# Patient Record
Sex: FEMALE | Race: WHITE | NOT HISPANIC OR LATINO | Employment: FULL TIME | URBAN - METROPOLITAN AREA
[De-identification: names, ages, dates, MRNs, and addresses within clinical notes are randomized per-mention and may not be internally consistent; named-entity substitution may affect disease eponyms.]

---

## 2023-02-22 LAB
GRAM STAIN: NORMAL
TISSUE/WOUND CULTURE/SMEAR: NORMAL

## 2023-04-13 LAB
NATRIURETIC PEPTIDE B (PG/ML) IN SER/PLAS: 32 PG/ML (ref 0–99)
TROPONIN I, HIGH SENSITIVITY: 36 NG/L (ref 0–34)

## 2023-09-07 PROBLEM — M17.11 OSTEOARTHRITIS OF RIGHT KNEE: Status: ACTIVE | Noted: 2023-09-07

## 2023-09-07 PROBLEM — R06.02 SHORTNESS OF BREATH AT REST: Status: ACTIVE | Noted: 2023-09-07

## 2023-09-07 PROBLEM — N20.0 NEPHROLITHIASIS: Status: ACTIVE | Noted: 2023-09-07

## 2023-09-07 PROBLEM — M70.62 TROCHANTERIC BURSITIS OF LEFT HIP: Status: ACTIVE | Noted: 2023-09-07

## 2023-09-07 PROBLEM — H01.006 BLEPHARITIS OF BOTH EYES: Status: ACTIVE | Noted: 2023-09-07

## 2023-09-07 PROBLEM — H01.003 BLEPHARITIS OF BOTH EYES: Status: ACTIVE | Noted: 2023-09-07

## 2023-09-07 PROBLEM — R92.8 ABNORMAL FINDING ON BREAST IMAGING: Status: ACTIVE | Noted: 2023-09-07

## 2023-09-07 PROBLEM — N39.46 MIXED INCONTINENCE: Status: ACTIVE | Noted: 2023-09-07

## 2023-09-07 PROBLEM — C50.211: Status: ACTIVE | Noted: 2023-09-07

## 2023-09-07 PROBLEM — I10 BENIGN ESSENTIAL HYPERTENSION: Status: ACTIVE | Noted: 2023-09-07

## 2023-09-07 PROBLEM — H25.013 CORTICAL AGE-RELATED CATARACT OF BOTH EYES: Status: ACTIVE | Noted: 2023-09-07

## 2023-09-07 PROBLEM — Z13.71 BRCA NEGATIVE: Status: ACTIVE | Noted: 2023-09-07

## 2023-09-07 PROBLEM — H52.203 MYOPIA OF BOTH EYES WITH ASTIGMATISM AND PRESBYOPIA: Status: ACTIVE | Noted: 2023-09-07

## 2023-09-07 PROBLEM — T88.8XXA FLUID COLLECTION AT SURGICAL SITE: Status: ACTIVE | Noted: 2023-09-07

## 2023-09-07 PROBLEM — Z78.0 MENOPAUSE: Status: ACTIVE | Noted: 2023-09-07

## 2023-09-07 PROBLEM — H52.31 ANISOMETROPIA: Status: ACTIVE | Noted: 2023-09-07

## 2023-09-07 PROBLEM — H25.13 NUCLEAR SCLEROSIS OF BOTH EYES: Status: ACTIVE | Noted: 2023-09-07

## 2023-09-07 PROBLEM — C44.92 SCC (SQUAMOUS CELL CARCINOMA): Status: ACTIVE | Noted: 2023-09-07

## 2023-09-07 PROBLEM — E66.9 OBESE: Status: ACTIVE | Noted: 2023-09-07

## 2023-09-07 PROBLEM — R53.1 GENERALIZED WEAKNESS: Status: ACTIVE | Noted: 2023-09-07

## 2023-09-07 PROBLEM — M17.11 PRIMARY LOCALIZED OSTEOARTHROSIS OF RIGHT LOWER LEG: Status: ACTIVE | Noted: 2023-09-07

## 2023-09-07 PROBLEM — Z17.0 MALIGNANT NEOPLASM OF CENTRAL PORTION OF RIGHT BREAST IN FEMALE, ESTROGEN RECEPTOR POSITIVE (MULTI): Status: ACTIVE | Noted: 2023-09-07

## 2023-09-07 PROBLEM — H52.4 MYOPIA OF BOTH EYES WITH ASTIGMATISM AND PRESBYOPIA: Status: ACTIVE | Noted: 2023-09-07

## 2023-09-07 PROBLEM — H52.13 MYOPIA OF BOTH EYES WITH ASTIGMATISM AND PRESBYOPIA: Status: ACTIVE | Noted: 2023-09-07

## 2023-09-07 PROBLEM — N39.0 RECURRENT UTI: Status: ACTIVE | Noted: 2023-09-07

## 2023-09-07 PROBLEM — R32 URINARY INCONTINENCE: Status: ACTIVE | Noted: 2023-09-07

## 2023-09-07 PROBLEM — R53.83 FATIGUE: Status: ACTIVE | Noted: 2023-09-07

## 2023-09-07 PROBLEM — M81.0 OSTEOPOROSIS: Status: ACTIVE | Noted: 2023-09-07

## 2023-09-07 PROBLEM — E04.1 THYROID NODULE: Status: ACTIVE | Noted: 2023-09-07

## 2023-09-07 PROBLEM — C50.111 MALIGNANT NEOPLASM OF CENTRAL PORTION OF RIGHT BREAST IN FEMALE, ESTROGEN RECEPTOR POSITIVE (MULTI): Status: ACTIVE | Noted: 2023-09-07

## 2023-09-07 PROBLEM — N63.10 BREAST MASS, RIGHT: Status: ACTIVE | Noted: 2023-09-07

## 2023-09-07 RX ORDER — ESCITALOPRAM OXALATE 5 MG/1
1 TABLET ORAL DAILY
COMMUNITY

## 2023-09-07 RX ORDER — SOLIFENACIN SUCCINATE 10 MG/1
1 TABLET, FILM COATED ORAL DAILY
COMMUNITY
Start: 2020-02-19 | End: 2023-11-30

## 2023-09-07 RX ORDER — LETROZOLE 2.5 MG/1
1 TABLET, FILM COATED ORAL DAILY
COMMUNITY
Start: 2022-01-04 | End: 2023-10-12

## 2023-09-07 RX ORDER — FAMOTIDINE 20 MG/1
1 TABLET, FILM COATED ORAL NIGHTLY
COMMUNITY
Start: 2023-04-13 | End: 2023-10-12

## 2023-09-07 RX ORDER — ACETAMINOPHEN 325 MG/1
650 TABLET ORAL EVERY 4 HOURS PRN
COMMUNITY
Start: 2023-01-25 | End: 2023-12-07 | Stop reason: WASHOUT

## 2023-09-07 RX ORDER — ESCITALOPRAM OXALATE 10 MG/1
1 TABLET ORAL DAILY
COMMUNITY
Start: 2023-04-13 | End: 2023-10-12

## 2023-09-07 RX ORDER — PROCHLORPERAZINE MALEATE 10 MG
10 TABLET ORAL EVERY 6 HOURS PRN
COMMUNITY
Start: 2022-11-11 | End: 2023-10-12

## 2023-09-07 RX ORDER — TOPIRAMATE 25 MG/1
1 TABLET ORAL DAILY PRN
COMMUNITY
Start: 2018-01-23

## 2023-09-07 RX ORDER — ONDANSETRON 4 MG/1
4 TABLET, ORALLY DISINTEGRATING ORAL
COMMUNITY
Start: 2022-04-15 | End: 2023-10-12

## 2023-09-07 RX ORDER — OMEPRAZOLE 20 MG/1
20 CAPSULE, DELAYED RELEASE ORAL DAILY
COMMUNITY

## 2023-09-07 RX ORDER — DIPHENHYDRAMINE HCL 25 MG
50 TABLET ORAL
COMMUNITY
Start: 2023-02-14 | End: 2023-10-12

## 2023-10-12 ENCOUNTER — APPOINTMENT (OUTPATIENT)
Dept: HEMATOLOGY/ONCOLOGY | Facility: CLINIC | Age: 68
End: 2023-10-12
Payer: COMMERCIAL

## 2023-10-12 ENCOUNTER — OFFICE VISIT (OUTPATIENT)
Dept: CARDIOLOGY | Facility: CLINIC | Age: 68
End: 2023-10-12
Payer: COMMERCIAL

## 2023-10-12 ENCOUNTER — HOSPITAL ENCOUNTER (OUTPATIENT)
Dept: CARDIOLOGY | Facility: CLINIC | Age: 68
Discharge: HOME | End: 2023-10-12
Payer: COMMERCIAL

## 2023-10-12 VITALS
SYSTOLIC BLOOD PRESSURE: 126 MMHG | HEART RATE: 68 BPM | OXYGEN SATURATION: 96 % | BODY MASS INDEX: 31.03 KG/M2 | DIASTOLIC BLOOD PRESSURE: 79 MMHG | WEIGHT: 187.4 LBS | TEMPERATURE: 97 F

## 2023-10-12 DIAGNOSIS — I25.3 ATRIAL SEPTAL ANEURYSM: Primary | ICD-10-CM

## 2023-10-12 DIAGNOSIS — Z51.81 ENCOUNTER FOR MONITORING CARDIOTOXIC DRUG THERAPY: ICD-10-CM

## 2023-10-12 DIAGNOSIS — Z79.899 ENCOUNTER FOR MONITORING CARDIOTOXIC DRUG THERAPY: ICD-10-CM

## 2023-10-12 LAB — EJECTION FRACTION APICAL 4 CHAMBER: 61.7

## 2023-10-12 PROCEDURE — 3074F SYST BP LT 130 MM HG: CPT | Performed by: NURSE PRACTITIONER

## 2023-10-12 PROCEDURE — 93356 MYOCRD STRAIN IMG SPCKL TRCK: CPT

## 2023-10-12 PROCEDURE — 1126F AMNT PAIN NOTED NONE PRSNT: CPT | Performed by: NURSE PRACTITIONER

## 2023-10-12 PROCEDURE — 93308 TTE F-UP OR LMTD: CPT | Performed by: STUDENT IN AN ORGANIZED HEALTH CARE EDUCATION/TRAINING PROGRAM

## 2023-10-12 PROCEDURE — 3008F BODY MASS INDEX DOCD: CPT | Performed by: NURSE PRACTITIONER

## 2023-10-12 PROCEDURE — 93356 MYOCRD STRAIN IMG SPCKL TRCK: CPT | Performed by: STUDENT IN AN ORGANIZED HEALTH CARE EDUCATION/TRAINING PROGRAM

## 2023-10-12 PROCEDURE — 76376 3D RENDER W/INTRP POSTPROCES: CPT | Performed by: STUDENT IN AN ORGANIZED HEALTH CARE EDUCATION/TRAINING PROGRAM

## 2023-10-12 PROCEDURE — 99213 OFFICE O/P EST LOW 20 MIN: CPT | Performed by: NURSE PRACTITIONER

## 2023-10-12 PROCEDURE — 3078F DIAST BP <80 MM HG: CPT | Performed by: NURSE PRACTITIONER

## 2023-10-12 PROCEDURE — 76376 3D RENDER W/INTRP POSTPROCES: CPT

## 2023-10-12 PROCEDURE — 1036F TOBACCO NON-USER: CPT | Performed by: NURSE PRACTITIONER

## 2023-10-12 RX ORDER — ASPIRIN 81 MG/1
81 TABLET ORAL ONCE
Status: SHIPPED | OUTPATIENT
Start: 2023-10-12

## 2023-10-12 RX ORDER — ANASTROZOLE 1 MG/1
1 TABLET ORAL DAILY
COMMUNITY
End: 2023-12-12 | Stop reason: SDUPTHER

## 2023-10-12 ASSESSMENT — PAIN SCALES - GENERAL: PAINLEVEL: 0-NO PAIN

## 2023-10-12 NOTE — PROGRESS NOTES
Radiation completed May 2023, Adriamycin commenced 1/2023    No new cardiac symptoms including SOB, CP, edema.

## 2023-10-13 NOTE — PROGRESS NOTES
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  She denies SOB, CHRISTIAN, edema, orthopnea, pnd, or chest pain.      Her last echo showed atrial septal aneurysm.  Today's echo shows same, with daistolic dysfunction, normal LVEF and GLS   Onco-Cardiology Measurements:  Historical Measurements from Previous Study  2D EF (Biplane)                     65%  3D EF                               57%  Global Longitudinal Strain (GLS) -22.9%     10/12/2023 Current Measurements  2D EF (Biplane)                     64%  3D EF                               55%  Global Longitudinal Strain (GLS) -24.4%              she has 3 children and works as an RN in pain management at [employer].    ROS full 10 point ROS complete and negative    Objective   Cardiovascular:      PMI at left midclavicular line. Normal rate. Regular rhythm. Normal S2.       No rub.   Pulses:     Intact distal pulses.   Edema:     Peripheral edema absent.         Lab Review:   Lab Results   Component Value Date     08/30/2023    K 3.9 08/30/2023     08/30/2023    CO2 30 08/30/2023    BUN 26 (H) 08/30/2023    CREATININE 0.96 08/30/2023    GLUCOSE 118 (H) 08/30/2023    CALCIUM 10.5 08/30/2023     Lab Results   Component Value Date    WBC 2.4 (L) 08/30/2023    HGB 11.1 (L) 08/30/2023    HCT 32.1 (L) 08/30/2023    MCV 96 08/30/2023     08/30/2023         Assessment/Plan   The encounter diagnosis was Atrial septal aneurysm.  67 yo CF with h/o SCC of tongue and  current breast cancer stage IIA.  Received AC and exemestane, radiation.  Currently on Anastrozole.  ASA noted on echo - no bubble study done.    - initiate EC 81mg ASA  - early diastolic dysfunction - monitor BP  - return for echo with bubble study and follow up in 3 months

## 2023-11-16 DIAGNOSIS — N39.41 URGE URINARY INCONTINENCE: Primary | ICD-10-CM

## 2023-11-17 ENCOUNTER — TELEPHONE (OUTPATIENT)
Dept: OBSTETRICS AND GYNECOLOGY | Facility: CLINIC | Age: 68
End: 2023-11-17
Payer: COMMERCIAL

## 2023-11-20 ENCOUNTER — TELEPHONE (OUTPATIENT)
Dept: OBSTETRICS AND GYNECOLOGY | Facility: CLINIC | Age: 68
End: 2023-11-20
Payer: COMMERCIAL

## 2023-11-20 DIAGNOSIS — N39.0 RECURRENT UTI: Primary | ICD-10-CM

## 2023-11-20 RX ORDER — NITROFURANTOIN 25; 75 MG/1; MG/1
100 CAPSULE ORAL EVERY 12 HOURS SCHEDULED
Qty: 14 CAPSULE | Refills: 0 | Status: SHIPPED | OUTPATIENT
Start: 2023-11-20

## 2023-11-20 RX ORDER — NITROFURANTOIN 25; 75 MG/1; MG/1
100 CAPSULE ORAL EVERY 12 HOURS SCHEDULED
COMMUNITY
Start: 2023-02-05 | End: 2023-11-20 | Stop reason: SDUPTHER

## 2023-11-20 NOTE — TELEPHONE ENCOUNTER
----- Message from Radha Shabazz sent at 11/19/2023 10:21 PM EST -----  Regarding: refill  Contact: [phone]  Rahul Temple sorry I missed you on Friday.  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  I need a refill for the Solefenacin 10 mg.  and the Macrobid prn. The way it was written  last year. Also, I am not sure if I need a yearly appointment.  I am over 65 and Hetal mentioned that a pap smear was not a yearly exam. Any questions  [phone].

## 2023-11-21 DIAGNOSIS — C44.92 SCC (SQUAMOUS CELL CARCINOMA): ICD-10-CM

## 2023-11-24 ENCOUNTER — APPOINTMENT (OUTPATIENT)
Dept: RADIATION ONCOLOGY | Facility: CLINIC | Age: 68
End: 2023-11-24
Payer: COMMERCIAL

## 2023-11-30 DIAGNOSIS — Z17.0 MALIGNANT NEOPLASM OF UPPER-INNER QUADRANT OF RIGHT BREAST IN FEMALE, ESTROGEN RECEPTOR POSITIVE (MULTI): Primary | ICD-10-CM

## 2023-11-30 DIAGNOSIS — C50.211 MALIGNANT NEOPLASM OF UPPER-INNER QUADRANT OF RIGHT BREAST IN FEMALE, ESTROGEN RECEPTOR POSITIVE (MULTI): Primary | ICD-10-CM

## 2023-11-30 RX ORDER — HEPARIN 100 UNIT/ML
500 SYRINGE INTRAVENOUS AS NEEDED
Status: CANCELLED | OUTPATIENT
Start: 2023-12-08

## 2023-11-30 RX ORDER — SOLIFENACIN SUCCINATE 10 MG/1
10 TABLET, FILM COATED ORAL DAILY
Qty: 90 TABLET | Refills: 0 | Status: SHIPPED | OUTPATIENT
Start: 2023-11-30 | End: 2024-04-03 | Stop reason: SDUPTHER

## 2023-11-30 RX ORDER — HEPARIN SODIUM,PORCINE/PF 10 UNIT/ML
50 SYRINGE (ML) INTRAVENOUS AS NEEDED
Status: CANCELLED | OUTPATIENT
Start: 2023-12-08

## 2023-12-08 ENCOUNTER — OFFICE VISIT (OUTPATIENT)
Dept: PRIMARY CARE | Facility: CLINIC | Age: 68
End: 2023-12-08
Payer: COMMERCIAL

## 2023-12-08 VITALS
HEART RATE: 74 BPM | HEIGHT: 65 IN | TEMPERATURE: 97.7 F | RESPIRATION RATE: 16 BRPM | DIASTOLIC BLOOD PRESSURE: 74 MMHG | WEIGHT: 186 LBS | OXYGEN SATURATION: 94 % | BODY MASS INDEX: 30.99 KG/M2 | SYSTOLIC BLOOD PRESSURE: 110 MMHG

## 2023-12-08 DIAGNOSIS — Z12.11 COLON CANCER SCREENING: Primary | ICD-10-CM

## 2023-12-08 PROCEDURE — 1126F AMNT PAIN NOTED NONE PRSNT: CPT | Performed by: INTERNAL MEDICINE

## 2023-12-08 PROCEDURE — 3078F DIAST BP <80 MM HG: CPT | Performed by: INTERNAL MEDICINE

## 2023-12-08 PROCEDURE — 1036F TOBACCO NON-USER: CPT | Performed by: INTERNAL MEDICINE

## 2023-12-08 PROCEDURE — 3074F SYST BP LT 130 MM HG: CPT | Performed by: INTERNAL MEDICINE

## 2023-12-08 PROCEDURE — 99213 OFFICE O/P EST LOW 20 MIN: CPT | Performed by: INTERNAL MEDICINE

## 2023-12-08 PROCEDURE — 1159F MED LIST DOCD IN RCRD: CPT | Performed by: INTERNAL MEDICINE

## 2023-12-08 PROCEDURE — 3008F BODY MASS INDEX DOCD: CPT | Performed by: INTERNAL MEDICINE

## 2023-12-08 NOTE — PROGRESS NOTES
"Subjective   Patient ID: Radha Shabazz is a 68 y.o. female who presents for Follow-up (Discuss order cologuard, vaccine questions).  Patient is for follow up. She has had a medically challenging year and is touching base on several issues. Including vaccinations and colonoscopy and other preventative measures.         Review of Systems    Objective   Physical Exam  /74   Pulse 74   Temp 36.5 °C (97.7 °F)   Resp 16   Ht 1.655 m (5' 5.16\")   Wt 84.4 kg (186 lb)   SpO2 94%   BMI 30.80 kg/m²         Assessment/Plan   Problem List Items Addressed This Visit    None  Visit Diagnoses       Colon cancer screening    -  Primary    Relevant Orders    Cologuard® colon cancer screening          She is in great spirits considering her challenges and it was great to reconnect and re calibrate her overall health care.        "

## 2023-12-11 ENCOUNTER — INFUSION (OUTPATIENT)
Dept: HEMATOLOGY/ONCOLOGY | Facility: CLINIC | Age: 68
End: 2023-12-11
Payer: COMMERCIAL

## 2023-12-11 ENCOUNTER — OFFICE VISIT (OUTPATIENT)
Dept: PLASTIC SURGERY | Facility: CLINIC | Age: 68
End: 2023-12-11
Payer: COMMERCIAL

## 2023-12-11 VITALS — BODY MASS INDEX: 30.99 KG/M2 | HEIGHT: 65 IN | WEIGHT: 186 LBS

## 2023-12-11 DIAGNOSIS — Z17.0 MALIGNANT NEOPLASM OF UPPER-INNER QUADRANT OF RIGHT BREAST IN FEMALE, ESTROGEN RECEPTOR POSITIVE (MULTI): ICD-10-CM

## 2023-12-11 DIAGNOSIS — C50.211 MALIGNANT NEOPLASM OF UPPER-INNER QUADRANT OF RIGHT BREAST IN FEMALE, ESTROGEN RECEPTOR POSITIVE (MULTI): ICD-10-CM

## 2023-12-11 DIAGNOSIS — Z17.0 MALIGNANT NEOPLASM OF CENTRAL PORTION OF RIGHT BREAST IN FEMALE, ESTROGEN RECEPTOR POSITIVE (MULTI): Primary | ICD-10-CM

## 2023-12-11 DIAGNOSIS — C50.111 MALIGNANT NEOPLASM OF CENTRAL PORTION OF RIGHT BREAST IN FEMALE, ESTROGEN RECEPTOR POSITIVE (MULTI): Primary | ICD-10-CM

## 2023-12-11 LAB
ALBUMIN SERPL BCP-MCNC: 4 G/DL (ref 3.4–5)
ALP SERPL-CCNC: 66 U/L (ref 33–136)
ALT SERPL W P-5'-P-CCNC: 12 U/L (ref 7–45)
ANION GAP SERPL CALC-SCNC: 12 MMOL/L (ref 10–20)
AST SERPL W P-5'-P-CCNC: 16 U/L (ref 9–39)
BASOPHILS # BLD AUTO: 0.03 X10*3/UL (ref 0–0.1)
BASOPHILS NFR BLD AUTO: 1.5 %
BILIRUB SERPL-MCNC: 0.5 MG/DL (ref 0–1.2)
BUN SERPL-MCNC: 25 MG/DL (ref 6–23)
CALCIUM SERPL-MCNC: 10.5 MG/DL (ref 8.6–10.6)
CHLORIDE SERPL-SCNC: 106 MMOL/L (ref 98–107)
CO2 SERPL-SCNC: 25 MMOL/L (ref 21–32)
CREAT SERPL-MCNC: 1.15 MG/DL (ref 0.5–1.05)
EOSINOPHIL # BLD AUTO: 0.11 X10*3/UL (ref 0–0.7)
EOSINOPHIL NFR BLD AUTO: 5.4 %
ERYTHROCYTE [DISTWIDTH] IN BLOOD BY AUTOMATED COUNT: 12.2 % (ref 11.5–14.5)
GFR SERPL CREATININE-BSD FRML MDRD: 52 ML/MIN/1.73M*2
GLUCOSE SERPL-MCNC: 121 MG/DL (ref 74–99)
HCT VFR BLD AUTO: 33.2 % (ref 36–46)
HGB BLD-MCNC: 11.5 G/DL (ref 12–16)
IMM GRANULOCYTES # BLD AUTO: 0 X10*3/UL (ref 0–0.7)
IMM GRANULOCYTES NFR BLD AUTO: 0 % (ref 0–0.9)
LYMPHOCYTES # BLD AUTO: 0.45 X10*3/UL (ref 1.2–4.8)
LYMPHOCYTES NFR BLD AUTO: 22.2 %
MCH RBC QN AUTO: 34.8 PG (ref 26–34)
MCHC RBC AUTO-ENTMCNC: 34.6 G/DL (ref 32–36)
MCV RBC AUTO: 101 FL (ref 80–100)
MONOCYTES # BLD AUTO: 0.22 X10*3/UL (ref 0.1–1)
MONOCYTES NFR BLD AUTO: 10.8 %
NEUTROPHILS # BLD AUTO: 1.22 X10*3/UL (ref 1.2–7.7)
NEUTROPHILS NFR BLD AUTO: 60.1 %
NRBC BLD-RTO: ABNORMAL /100{WBCS}
PLATELET # BLD AUTO: 173 X10*3/UL (ref 150–450)
POTASSIUM SERPL-SCNC: 4.3 MMOL/L (ref 3.5–5.3)
PROT SERPL-MCNC: 6.6 G/DL (ref 6.4–8.2)
RBC # BLD AUTO: 3.3 X10*6/UL (ref 4–5.2)
SODIUM SERPL-SCNC: 139 MMOL/L (ref 136–145)
WBC # BLD AUTO: 2 X10*3/UL (ref 4.4–11.3)

## 2023-12-11 PROCEDURE — 36415 COLL VENOUS BLD VENIPUNCTURE: CPT

## 2023-12-11 PROCEDURE — 99213 OFFICE O/P EST LOW 20 MIN: CPT | Performed by: SURGERY

## 2023-12-11 PROCEDURE — 1159F MED LIST DOCD IN RCRD: CPT | Performed by: SURGERY

## 2023-12-11 PROCEDURE — 96523 IRRIG DRUG DELIVERY DEVICE: CPT

## 2023-12-11 PROCEDURE — 80053 COMPREHEN METABOLIC PANEL: CPT

## 2023-12-11 PROCEDURE — 3008F BODY MASS INDEX DOCD: CPT | Performed by: SURGERY

## 2023-12-11 PROCEDURE — 85025 COMPLETE CBC W/AUTO DIFF WBC: CPT

## 2023-12-11 PROCEDURE — 1036F TOBACCO NON-USER: CPT | Performed by: SURGERY

## 2023-12-11 PROCEDURE — 36591 DRAW BLOOD OFF VENOUS DEVICE: CPT

## 2023-12-11 PROCEDURE — 1126F AMNT PAIN NOTED NONE PRSNT: CPT | Performed by: SURGERY

## 2023-12-11 RX ORDER — HEPARIN SODIUM,PORCINE/PF 10 UNIT/ML
50 SYRINGE (ML) INTRAVENOUS AS NEEDED
Status: CANCELLED | OUTPATIENT
Start: 2024-01-01

## 2023-12-11 RX ORDER — HEPARIN 100 UNIT/ML
500 SYRINGE INTRAVENOUS AS NEEDED
Status: CANCELLED | OUTPATIENT
Start: 2024-01-01

## 2023-12-11 RX ORDER — HEPARIN 100 UNIT/ML
500 SYRINGE INTRAVENOUS AS NEEDED
Status: DISCONTINUED | OUTPATIENT
Start: 2023-12-11 | End: 2023-12-11 | Stop reason: HOSPADM

## 2023-12-11 NOTE — PROGRESS NOTES
Department of Plastic and Reconstructive Surgery            FOLLOW UP Visit    Date: 12/11/2023   Date of Surgery: 9/26/22, 9/17/2022, 10/6/2022    Subjective   Radha Shabazz is a 68 y.o. female  she is status post right mastectomy by Dr. Abraham followed by immediate right breast reconstruction performed on September 16, 2022, complicated by postoperative hematoma status post return the OR for hematoma evacuation and replacement of prepectoral tissue expander construct and ADM on September 17, 2022, she is now status post right breast revision, excision of non-vitalized tissue of the vertical limb of the incision, performed on October 6, 2022.  We saw her last in May 2023 for skin check, she noted to have at that time significant radiation-induced changes of the skin.     She is doing excellently, has no acute complaints, she continues to moisturize daily over the right breast mastectomy flaps which achieved significant improvement, subjectively.        She presents previously to complete her tissue expansion process. She has had no difficulty from previous expansion. She states she has plans to begin radiation therapy soon. She has no complaints at this time.   The total fill to date:     Right: 180+120+120+180cc+120cc= 720cc        We decided to continue to monitor for another 3 months to see if the skin tightness abates or gets worse. We discussed that if the tightening/contracture continues to progress then we would probably defer to a free deep inferior epigastric  surgery.   At this time we discussed the plan would be for a right side second stage breast implant exchange with fat grafting and a matching contralateral mastopexy, but if the skin does continue to progress with contracture we would opt for a free BETTY.          Objective    There were no vitals filed for this visit.    Gen: interactive and pleasant  Head: NCAT  Eyes: EOMI, PERRLA  Mouth: MMM  Throat: trachea midline  Cor:  RRR  Pulm: nonlabored breathing  Abd: s/nt/nd  Neuro: AAOx3  Ext: extremities perfused    Body mass index is 30.95 kg/m².      Focused exam of the: Right breast skin shows improvement in radiation-induced changes, however there is still is very glossy appearance to the skin with firmness.    Assessment/Plan       Radha is a 68 y.o. female who presents for she is status post right mastectomy by Dr. Abraham followed by immediate right breast reconstruction performed on September 16, 2022, complicated by postoperative hematoma status post return the OR for hematoma evacuation and replacement of prepectoral tissue expander construct and ADM on September 17, 2022, she is now status post right breast revision, excision of non-vitalized tissue of the vertical limb of the incision, performed on October 6, 2022.    Plan:   Previously we had discussed offering her either second stage breast reconstruction with contralateral matching reduction.  We also discussed possible free deep inferior epigastric  artery.    My estimation she still candidate for both, however we did discuss that performing free deep inferior epigastric  artery at this time would reduce the chances of future problems related to contracture and cosmetic dissatisfaction and asymmetry.  I indicated to her that performing the free BETTY surgery at this time would be better than performing in the future, given her advancing age and anticipated decrease in functional reserve capacity moving forward.  Therefore was my recommendation that she consider free deep inferior epigastric  surgery at this time, although she still remains a candidate for right second stage breast implant with liposuction from the flanks-we will perform flank only liposuction in order to preserve the medial and lateral perforators for free deep inferior epigastric  flap in the future.      We will perform a phone call follow-up to reconvene with her and  her  to see what she would like to do moving forward.  She would like time to consider this    I spent 30 minutes with this patient. Greater than 50% of this time was spent in the counselling and/or coordination of care of this patient.  This note was created using voice recognition software and was not corrected for typographical or grammatical errors.    Signature: Armando Dickson MD   Date: 12/11/2023

## 2023-12-12 ENCOUNTER — ONCOLOGY MEDICATION OUTREACH (OUTPATIENT)
Dept: HEMATOLOGY/ONCOLOGY | Facility: CLINIC | Age: 68
End: 2023-12-12
Payer: COMMERCIAL

## 2023-12-12 ENCOUNTER — OFFICE VISIT (OUTPATIENT)
Dept: HEMATOLOGY/ONCOLOGY | Facility: HOSPITAL | Age: 68
End: 2023-12-12
Payer: COMMERCIAL

## 2023-12-12 VITALS
SYSTOLIC BLOOD PRESSURE: 123 MMHG | DIASTOLIC BLOOD PRESSURE: 82 MMHG | TEMPERATURE: 97 F | BODY MASS INDEX: 31.17 KG/M2 | WEIGHT: 187.3 LBS | OXYGEN SATURATION: 97 % | HEART RATE: 77 BPM

## 2023-12-12 DIAGNOSIS — C50.211 MALIGNANT NEOPLASM OF UPPER-INNER QUADRANT OF RIGHT BREAST IN FEMALE, ESTROGEN RECEPTOR POSITIVE (MULTI): Primary | ICD-10-CM

## 2023-12-12 DIAGNOSIS — Z17.0 MALIGNANT NEOPLASM OF UPPER-INNER QUADRANT OF RIGHT BREAST IN FEMALE, ESTROGEN RECEPTOR POSITIVE (MULTI): Primary | ICD-10-CM

## 2023-12-12 DIAGNOSIS — C44.92 SCC (SQUAMOUS CELL CARCINOMA): ICD-10-CM

## 2023-12-12 PROCEDURE — 3074F SYST BP LT 130 MM HG: CPT | Performed by: INTERNAL MEDICINE

## 2023-12-12 PROCEDURE — 1125F AMNT PAIN NOTED PAIN PRSNT: CPT | Performed by: INTERNAL MEDICINE

## 2023-12-12 PROCEDURE — 1159F MED LIST DOCD IN RCRD: CPT | Performed by: INTERNAL MEDICINE

## 2023-12-12 PROCEDURE — 99215 OFFICE O/P EST HI 40 MIN: CPT | Performed by: INTERNAL MEDICINE

## 2023-12-12 PROCEDURE — 1036F TOBACCO NON-USER: CPT | Performed by: INTERNAL MEDICINE

## 2023-12-12 PROCEDURE — 3079F DIAST BP 80-89 MM HG: CPT | Performed by: INTERNAL MEDICINE

## 2023-12-12 PROCEDURE — 3008F BODY MASS INDEX DOCD: CPT | Performed by: INTERNAL MEDICINE

## 2023-12-12 RX ORDER — FAMOTIDINE 10 MG/ML
20 INJECTION INTRAVENOUS ONCE AS NEEDED
Status: CANCELLED | OUTPATIENT
Start: 2024-03-05

## 2023-12-12 RX ORDER — ANASTROZOLE 1 MG/1
1 TABLET ORAL DAILY
Qty: 30 TABLET | Refills: 5 | Status: SHIPPED | OUTPATIENT
Start: 2023-12-12

## 2023-12-12 RX ORDER — DIPHENHYDRAMINE HYDROCHLORIDE 50 MG/ML
50 INJECTION INTRAMUSCULAR; INTRAVENOUS AS NEEDED
Status: CANCELLED | OUTPATIENT
Start: 2024-03-05

## 2023-12-12 RX ORDER — ALBUTEROL SULFATE 0.83 MG/ML
3 SOLUTION RESPIRATORY (INHALATION) AS NEEDED
Status: CANCELLED | OUTPATIENT
Start: 2024-03-05

## 2023-12-12 RX ORDER — EPINEPHRINE 0.3 MG/.3ML
0.3 INJECTION SUBCUTANEOUS EVERY 5 MIN PRN
Status: CANCELLED | OUTPATIENT
Start: 2024-03-05

## 2023-12-12 ASSESSMENT — PAIN SCALES - GENERAL: PAINLEVEL: 4

## 2023-12-12 NOTE — PROGRESS NOTES
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 The patient's breast cancer was diagnosed on December 21, 2021, and was grade 2,  estrogen receptor positive at >95%/>95%, progesterone receptor positive at >95%/>95%%, and HER-2/berta negative.   MammaPrint Index = +0.142; translating to Low Risk Luminal-Type A.      CURRENT THERAPY: Arimidex and Abemaciclib      Details of her history are as follows:      12/02/2021: Patient underwent a screening mammogram. This showed a focal asymmetry in the central aspect of the right breast   12/15/2021: Patient underwent a right diagnostic mammogram and breast US. This revealed a mass at the 1:00, 6 cm from the nipple measuring 1.7 x 1.2 x 1.1 cm. Another mass measuring 0.5 cm was seen at 5:00, 4 cm from the nipple. Right axilla US revealed  3 unremarkable LNs. Breast tissue was extremely dense.   12/21/2021: Patient underwent US-guided core biopsies from two sites in the right breast, from 1:00, 6 cm from the nipple and from 9:00, 9 cm from the nipple. Pathology revealed above results.   MRI of the breast was attempted but patient could not tolerate it   01/04/2022: Patient was started on Letrozole 2.5 mg by mouth daily   4/15/2022: Completed a mid treatment US. This showed a stable to slight interval decrease in size of biopsy proven carcinoma.    06/13/2022: Completed right breast US. This showed interval decrease in size of biopsy proven carcinoma.   09/16/2022: Patient underwent a right mastectomy with SNLB and immediate reconstruction. Pathology revealed a 5.2 cm mass with 3/8 macrometastatic LNs plus one LN with isolated  tumor cell. Size of largest LN was 0.3cm. ([id number])   09/17/2022: Evacuation of hematoma with 750 cc of estimated blood loss   10/06/2022: Revision of right reconstructed breast   12/02/2022: Received the first dose of AC   1/13/2023: Received the 4th and last dose of AC after which chemotherapy was discontinued due to side effects and hospitalization. Patient therefore did not receive any Taxane   04/07/2023: Started Exemestane   04/13/2023: Completed radiation   06/30/2023: Exemestane discontinued   July 2023? Started Abemaciclib   06/30/2023: Started Anastrozole                       History of Present Illness:      ID Statement:    SHABBIR SHABAZZ is a 68 year old Female        Chief Complaint: Here for a follow-up visit. Today  is C2D14 of Abemaciclib      Interval History:    Ms. 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 The patient's breast cancer was diagnosed on December 21, 2021, and was grade 2,  estrogen receptor positive at >95%/>95%, progesterone receptor positive at >95%/>95%%, and HER-2/berta negative.  MammaPrint  Index = +0.142; translating to Low Risk Luminal-Type A.     She completed 4 cycles of AC on 1/13/2023 after which chemotherapy was discontinued due to side effects and hospitalization. Patient therefore did not receive any Taxane     INTERVAL HISTORY  Ms. Shabazz returns for a follow-up visit. She reports that she feels fatigued and is also concerned about her blood counts. Reports easy bruising     She reports that she is tolerating Arimidex better than Aromasin.     Her last mammogram was a left mammogram completed on 11/2/2022 and was NAD.  Her most recent DEXA was completed on 04/15/2022  and was normal.            Review of Systems:   ·  System Review All other systems have been reviewed and are negative for complaint.             Allergies and Intolerances:       Allergies:         acetaZOLAMIDE: Drug, Unknown, Active         iodinated radiocontrast agents: Drug Category, Unknown, Active         sulfa drugs: Drug Category, Unknown, Active     Outpatient Medication Profile:       Current Outpatient Medications:     escitalopram (Lexapro) 5 mg tablet, Take 1 tablet (5 mg) by mouth once daily., Disp: , Rfl:     nitrofurantoin, macrocrystal-monohydrate, (Macrobid) 100 mg capsule, Take 1 capsule (100 mg) by mouth every 12 hours., Disp: 14 capsule, Rfl: 0    omeprazole (PriLOSEC) 20 mg DR capsule, Take 1 capsule (20 mg) by mouth once daily., Disp: , Rfl:     solifenacin (VESIcare) 10 mg tablet, TAKE ONE TABLET BY MOUTH DAILY, Disp: 90 tablet, Rfl: 0    topiramate (Topamax) 25 mg tablet, Take 1 tablet (25 mg) by mouth once daily as needed., Disp: , Rfl:     abemaciclib (Verzenio) 50 mg tablet, Take 1 tablet (50 mg total) by mouth 2 times a day.  Swallow whole., Disp: 60 tablet, Rfl: 3    anastrozole (Arimidex) 1 mg tablet, Take 1 tablet (1 mg total) by mouth once daily.  Swallow whole with a drink of water., Disp: 30 tablet, Rfl: 5    UNABLE TO FIND, Breast prosthesis for left and/or right breast, Disp: , Rfl:     UNABLE TO FIND, Mastectomy Bra for Right Mastectomy, Disp: , Rfl:     Current Facility-Administered Medications:     aspirin EC tablet 81 mg, 81 mg, oral, Once, Elizabeth Trujillo, APRN-CNP          Surg History:         Goiter: ICD-10: E04.9, Status: Active         Oncology follow-up encounter: ICD-10: Z09, Status: Active         Shortness of breath on exertion: ICD-10: R06.02, Status:  Active         Shortness of breath at rest: ICD-10: R06.02, Status: Active         Abnormal computed tomography of lung: ICD-10: R91.8, Status:  Active         Urinary tract infection without hematuria, site unspecified : ICD-10: N39.0, Status: Active         Chemotherapy-induced fatigue: ICD-10: R53.83, Status: Active         Weakness  generalized: ICD-10: R53.1, Status: Active         Malignant neoplasm of lower-outer quadrant of right female breast : ICD-10: C50.511, Status: Active         Shortness of breath at rest: ICD-10: R06.02, Status: Active         Encounter for chemotherapy management: ICD-10: Z51.11, Status:  Active     Family History: No Family History items are recorded  in the problem list.       Social History:   Social Substance History:  ·  Smoking Status never smoker (1)   ·  Additional History     Past medical history: Hypertension, GERD, migraines, shingles, oral tongue cancer.      Past surgical history: Appendectomy, CAYDEN/BSO, partial glossectomy with split thickness skin graft from her left wrist.     Additional Social History:  She is  and accompanied today by her .     Family History:  Patient has a personal history of head and neck cancer. Patient has twopaternal  uncles with history of colon cancer and head and neck cancer     Breast Cancer Risk Factors:  , Had her menarche at age 12 years, 1st pregnancy at age 31 years, TAHBSO for fibroid uterus at age 46 years, used BCP for a 10 years in her 20s and  HRT until current diagnosis for a total of 20 years.  (1)           Vitals and Measurements:   Visit Vitals  /82   Pulse 77   Temp 36.1 °C (97 °F)   Wt 85 kg (187 lb 4.8 oz)   SpO2 97%   BMI 31.17 kg/m²   Smoking Status Never   BSA 1.97 m²      Physical Exam:      Constitutional: Well developed, awake/alert/oriented  x3, no distress, alert and cooperative, has more energy then at past visits   Eyes: PERRL, clear sclera   Respiratory/Thorax: Patent airways, normal breath  sounds with good chest expansion   Cardiovascular: Regular rate and rhythm, no murmurs;  muffled   Gastrointestinal: Nondistended, soft, non-tender,  no masses palpable, no organomegaly   Musculoskeletal: ROM intact, no joint swelling, normal  strength   Extremities: normal extremities, no cyanosis edema,  contusions or wounds, no  clubbing   Neurological: alert and oriented x3, weak   Breast: Right mastectomy with implant reconstruction  is without masses, nodules, skin changes. Left breast without masses, nodules, skin changes, discharge.   Psychological: Appropriate mood and behavior   Skin: Warm and dry, no lesions, no rashes         Lab Results:     Lab Results   Component Value Date    WBC 2.0 (L) 12/11/2023    HGB 11.5 (L) 12/11/2023    HCT 33.2 (L) 12/11/2023     (H) 12/11/2023     12/11/2023        Lab Results   Component Value Date    NEUTROABS 1.22 12/11/2023          Chemistry    Lab Results   Component Value Date/Time     12/11/2023 0822    K 4.3 12/11/2023 0822     12/11/2023 0822    CO2 25 12/11/2023 0822    BUN 25 (H) 12/11/2023 0822    CREATININE 1.15 (H) 12/11/2023 0822    Lab Results   Component Value Date/Time    CALCIUM 10.5 12/11/2023 0822    ALKPHOS 66 12/11/2023 0822    AST 16 12/11/2023 0822    ALT 12 12/11/2023 0822    BILITOT 0.5 12/11/2023 0822         Radiology Result:     ·  Results             Impression:     No mammographic evidence of malignancy.     BI-RADS CATEGORY:  Category: 1 - Negative.  Recommendation: 1 Year Follow-up.     For any future breast imaging appointments, please call [phone]  ([phone]).     Patient letter sent SNORM      Mamm Digital Diagnostic Mammography Unilateral Left with tomosynthesis [Nov 2 2022  3:33PM]          Assessment and Plan:   Assessment:    Ms. 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 The patient's breast cancer was diagnosed on December 21, 2021, and was grade 2,  estrogen receptor positive at >95%/>95%, progesterone receptor positive at >95%/>95%%, and HER-2/berta negative. MammaPrint  Index = +0.142; translating to Low Risk Luminal-Type A.      Currently on Arimidex and  Abemaciclib. Patient is requesting dose reduction in Abemaciclib due to side effects.  Her ANC today is 1220k/ul.         Plan:        Continue Arimidex 1 mg by mouth daily in 2 weeks   Reduce Abemaciclib to 50mg by BID.    Next Zometa in March   CBC with diff and CMP in 3 months   RTC 3/3 for 2 nd dose Zometa and MD visit  Left mammogram

## 2023-12-13 ENCOUNTER — ANCILLARY PROCEDURE (OUTPATIENT)
Dept: RADIOLOGY | Facility: CLINIC | Age: 68
End: 2023-12-13
Payer: COMMERCIAL

## 2023-12-13 ENCOUNTER — SOCIAL WORK (OUTPATIENT)
Dept: CASE MANAGEMENT | Facility: HOSPITAL | Age: 68
End: 2023-12-13
Payer: COMMERCIAL

## 2023-12-13 DIAGNOSIS — Z17.0 MALIGNANT NEOPLASM OF UPPER-INNER QUADRANT OF RIGHT BREAST IN FEMALE, ESTROGEN RECEPTOR POSITIVE (MULTI): ICD-10-CM

## 2023-12-13 DIAGNOSIS — C50.211 MALIGNANT NEOPLASM OF UPPER-INNER QUADRANT OF RIGHT BREAST IN FEMALE, ESTROGEN RECEPTOR POSITIVE (MULTI): ICD-10-CM

## 2023-12-13 PROCEDURE — 77065 DX MAMMO INCL CAD UNI: CPT | Mod: LEFT SIDE | Performed by: RADIOLOGY

## 2023-12-13 PROCEDURE — 77061 BREAST TOMOSYNTHESIS UNI: CPT | Mod: LT

## 2023-12-13 PROCEDURE — G0279 TOMOSYNTHESIS, MAMMO: HCPCS | Mod: LEFT SIDE | Performed by: RADIOLOGY

## 2023-12-13 NOTE — PROGRESS NOTES
DB had reached out to patient and spouse to offer assistance with the renewal application for free Verzenio with [email or web address]. Spouse provided completed application. SW able to have Dr. Robin review and sign HCP sections. Completed application sent via fax today. DB to follow.   ADDENDUM: December 19, 2023 Spouse reached out to DB. Patient approved for assistance with [facility] [place] for free Verzenio. Med-onc changed the dose. DB to obtain a new RX and send to [facility]. SW to follow.

## 2023-12-14 ENCOUNTER — SPECIALTY PHARMACY (OUTPATIENT)
Dept: PHARMACY | Facility: CLINIC | Age: 68
End: 2023-12-14

## 2024-01-05 ENCOUNTER — SOCIAL WORK (OUTPATIENT)
Dept: CASE MANAGEMENT | Facility: HOSPITAL | Age: 69
End: 2024-01-05
Payer: COMMERCIAL

## 2024-01-07 LAB — NONINV COLON CA DNA+OCC BLD SCRN STL QL: NEGATIVE

## 2024-01-08 NOTE — PROGRESS NOTES
SW received call from patient's spouse. They have not received the Verzenio for this month. SW offered to investigate. DB reached out to [facility]. They are in need of a new RX as dose has changed. CNP able [place] sign RX [place]. DB had CNP sign; faxed to [facility]. DB was informed by [facility] to call on Monday 01.08.2024 to arrange delivery. DB advised spouse.  ADDENDUM: January 8, 2024 DB reached out to [facility]. RX verified. Patient needs to call pharmacy to arrange delivery. DB spoke with spouse; information on how to contact pharmacy was emailed to spouse, per his request. Spouse aware they need to call monthly to arrange delivery. SW available as needed.

## 2024-01-12 ENCOUNTER — APPOINTMENT (OUTPATIENT)
Dept: CARDIOLOGY | Facility: CLINIC | Age: 69
End: 2024-01-12
Payer: COMMERCIAL

## 2024-01-12 DIAGNOSIS — Z85.3 PERSONAL HISTORY OF BREAST CANCER: Primary | ICD-10-CM

## 2024-01-12 RX ORDER — ANASTROZOLE 1 MG/1
1 TABLET ORAL DAILY
Qty: 90 TABLET | Refills: 3 | Status: SHIPPED | OUTPATIENT
Start: 2024-01-12

## 2024-01-18 ENCOUNTER — LAB (OUTPATIENT)
Dept: LAB | Facility: LAB | Age: 69
End: 2024-01-18
Payer: COMMERCIAL

## 2024-01-18 ENCOUNTER — OFFICE VISIT (OUTPATIENT)
Dept: CARDIOLOGY | Facility: CLINIC | Age: 69
End: 2024-01-18
Payer: COMMERCIAL

## 2024-01-18 ENCOUNTER — HOSPITAL ENCOUNTER (OUTPATIENT)
Dept: CARDIOLOGY | Facility: CLINIC | Age: 69
Discharge: HOME | End: 2024-01-18
Payer: COMMERCIAL

## 2024-01-18 VITALS
BODY MASS INDEX: 31.29 KG/M2 | SYSTOLIC BLOOD PRESSURE: 123 MMHG | TEMPERATURE: 97.7 F | DIASTOLIC BLOOD PRESSURE: 86 MMHG | RESPIRATION RATE: 18 BRPM | OXYGEN SATURATION: 99 % | WEIGHT: 188.05 LBS | HEART RATE: 75 BPM

## 2024-01-18 DIAGNOSIS — D64.9 ANEMIA, UNSPECIFIED TYPE: ICD-10-CM

## 2024-01-18 DIAGNOSIS — C50.211 MALIGNANT NEOPLASM OF UPPER-INNER QUADRANT OF RIGHT BREAST IN FEMALE, ESTROGEN RECEPTOR POSITIVE (MULTI): ICD-10-CM

## 2024-01-18 DIAGNOSIS — Z79.899 ENCOUNTER FOR MONITORING CARDIOTOXIC DRUG THERAPY: Primary | ICD-10-CM

## 2024-01-18 DIAGNOSIS — Z51.81 ENCOUNTER FOR MONITORING CARDIOTOXIC DRUG THERAPY: Primary | ICD-10-CM

## 2024-01-18 DIAGNOSIS — Z17.0 MALIGNANT NEOPLASM OF UPPER-INNER QUADRANT OF RIGHT BREAST IN FEMALE, ESTROGEN RECEPTOR POSITIVE (MULTI): ICD-10-CM

## 2024-01-18 DIAGNOSIS — I25.3 ATRIAL SEPTAL ANEURYSM: ICD-10-CM

## 2024-01-18 DIAGNOSIS — I25.3 ATRIAL SEPTAL ANEURYSM: Primary | ICD-10-CM

## 2024-01-18 LAB
BASOPHILS # BLD AUTO: 0.06 X10*3/UL (ref 0–0.1)
BASOPHILS NFR BLD AUTO: 1.6 %
EJECTION FRACTION APICAL 4 CHAMBER: 63.1
EJECTION FRACTION: 69
EOSINOPHIL # BLD AUTO: 0.18 X10*3/UL (ref 0–0.7)
EOSINOPHIL NFR BLD AUTO: 4.7 %
ERYTHROCYTE [DISTWIDTH] IN BLOOD BY AUTOMATED COUNT: 12.3 % (ref 11.5–14.5)
HCT VFR BLD AUTO: 38.5 % (ref 36–46)
HGB BLD-MCNC: 12.8 G/DL (ref 12–16)
IMM GRANULOCYTES # BLD AUTO: 0.01 X10*3/UL (ref 0–0.7)
IMM GRANULOCYTES NFR BLD AUTO: 0.3 % (ref 0–0.9)
IRON SATN MFR SERPL: 42 % (ref 25–45)
IRON SERPL-MCNC: 135 UG/DL (ref 35–150)
LYMPHOCYTES # BLD AUTO: 0.66 X10*3/UL (ref 1.2–4.8)
LYMPHOCYTES NFR BLD AUTO: 17.3 %
MCH RBC QN AUTO: 33.2 PG (ref 26–34)
MCHC RBC AUTO-ENTMCNC: 33.2 G/DL (ref 32–36)
MCV RBC AUTO: 100 FL (ref 80–100)
MITRAL VALVE E/A RATIO: 0.73
MONOCYTES # BLD AUTO: 0.45 X10*3/UL (ref 0.1–1)
MONOCYTES NFR BLD AUTO: 11.8 %
NEUTROPHILS # BLD AUTO: 2.46 X10*3/UL (ref 1.2–7.7)
NEUTROPHILS NFR BLD AUTO: 64.3 %
NRBC BLD-RTO: 0 /100 WBCS (ref 0–0)
PLATELET # BLD AUTO: 228 X10*3/UL (ref 150–450)
RBC # BLD AUTO: 3.85 X10*6/UL (ref 4–5.2)
TIBC SERPL-MCNC: 324 UG/DL (ref 240–445)
UIBC SERPL-MCNC: 189 UG/DL (ref 110–370)
WBC # BLD AUTO: 3.8 X10*3/UL (ref 4.4–11.3)

## 2024-01-18 PROCEDURE — 1126F AMNT PAIN NOTED NONE PRSNT: CPT | Performed by: NURSE PRACTITIONER

## 2024-01-18 PROCEDURE — 93325 DOPPLER ECHO COLOR FLOW MAPG: CPT | Performed by: INTERNAL MEDICINE

## 2024-01-18 PROCEDURE — 83540 ASSAY OF IRON: CPT

## 2024-01-18 PROCEDURE — 99213 OFFICE O/P EST LOW 20 MIN: CPT | Mod: ZK | Performed by: NURSE PRACTITIONER

## 2024-01-18 PROCEDURE — 85025 COMPLETE CBC W/AUTO DIFF WBC: CPT

## 2024-01-18 PROCEDURE — 1159F MED LIST DOCD IN RCRD: CPT | Performed by: NURSE PRACTITIONER

## 2024-01-18 PROCEDURE — 3008F BODY MASS INDEX DOCD: CPT | Performed by: NURSE PRACTITIONER

## 2024-01-18 PROCEDURE — 99213 OFFICE O/P EST LOW 20 MIN: CPT | Performed by: NURSE PRACTITIONER

## 2024-01-18 PROCEDURE — 93321 DOPPLER ECHO F-UP/LMTD STD: CPT | Performed by: INTERNAL MEDICINE

## 2024-01-18 PROCEDURE — 93356 MYOCRD STRAIN IMG SPCKL TRCK: CPT | Performed by: INTERNAL MEDICINE

## 2024-01-18 PROCEDURE — 93308 TTE F-UP OR LMTD: CPT | Performed by: INTERNAL MEDICINE

## 2024-01-18 PROCEDURE — 2500000004 HC RX 250 GENERAL PHARMACY W/ HCPCS (ALT 636 FOR OP/ED): Performed by: INTERNAL MEDICINE

## 2024-01-18 PROCEDURE — 3074F SYST BP LT 130 MM HG: CPT | Performed by: NURSE PRACTITIONER

## 2024-01-18 PROCEDURE — 76376 3D RENDER W/INTRP POSTPROCES: CPT

## 2024-01-18 PROCEDURE — 76376 3D RENDER W/INTRP POSTPROCES: CPT | Performed by: INTERNAL MEDICINE

## 2024-01-18 PROCEDURE — 1036F TOBACCO NON-USER: CPT | Performed by: NURSE PRACTITIONER

## 2024-01-18 PROCEDURE — 83550 IRON BINDING TEST: CPT

## 2024-01-18 PROCEDURE — 3079F DIAST BP 80-89 MM HG: CPT | Performed by: NURSE PRACTITIONER

## 2024-01-18 RX ADMIN — PERFLUTREN 3 ML OF DILUTION: 6.52 INJECTION, SUSPENSION INTRAVENOUS at 10:32

## 2024-01-18 ASSESSMENT — PAIN SCALES - GENERAL: PAINLEVEL: 0-NO PAIN

## 2024-01-18 NOTE — PROGRESS NOTES
Chief Complaint:  Follow-up cardiovascular evaluation and optimization and ongoing monitoring for cardiotoxic medication     HPI  Cancer Diagnosis: SCC of tongue, right breast 12/2021          Treatment: ddAC x4 12/02/2022 to 1/13/2022 +Taxol  Currently on Zometa 50mg   Cumulative Dose: 240 mg/m2  Radiation: No    Presents today for follow up.  C/O exertional dyspnea, especially when carrying something upstairs.  No chest pain, orthopnea, pnd, edema.  Continues to be able to work without a problem.  Has occasional orthostatic dizziness.  Denies fever, chills, night sweats.    Risk Factors: HTN  Social Hx: Never smoker   Family Hx:   Cardiac Medications- spironolactone, asa    Visit Vitals  /86   Pulse 75   Temp 36.5 °C (97.7 °F)   Resp 18   Wt 85.3 kg (188 lb 0.8 oz)   SpO2 99%   BMI 31.29 kg/m²   OB Status Postmenopausal   Smoking Status Never   BSA 1.98 m²     Current Outpatient Medications   Medication Instructions    abemaciclib (VERZENIO) 50 mg, oral, 2 times daily, Swallow whole.    anastrozole (ARIMIDEX) 1 mg, oral, Daily, Swallow whole with a drink of water.    anastrozole (ARIMIDEX) 1 mg, oral, Daily, Swallow whole with a drink of water.    escitalopram (Lexapro) 5 mg tablet 1 tablet, oral, Daily    nitrofurantoin, macrocrystal-monohydrate, (Macrobid) 100 mg capsule 100 mg, oral, Every 12 hours scheduled    omeprazole (PRILOSEC) 20 mg, oral, Daily    solifenacin (VESICARE) 10 mg, oral, Daily    topiramate (Topamax) 25 mg tablet 1 tablet, oral, Daily PRN    UNABLE TO FIND Breast prosthesis for left and/or right breast    UNABLE TO FIND Mastectomy Bra for Right Mastectomy      PE:  alert oriented, nAD  Lungs ctab  Heart RRR S1S2 no m/r/g neck veins flat  Abd benign  No edema    A/P:  67 yo CF on long term zometa, having routine echoes.  Now c/o exertional dyspnea without associated symptoms.  Normal BNP.  - EKG today  - check iron studies  - continue asa  - continue low sugar diet, eliminate processed  foods  - follow up onco echo and visit in 3 months - if still having dyspnea at follow up will do nuclear stress    -

## 2024-01-24 ENCOUNTER — INFUSION (OUTPATIENT)
Dept: HEMATOLOGY/ONCOLOGY | Facility: CLINIC | Age: 69
End: 2024-01-24
Payer: COMMERCIAL

## 2024-01-24 ENCOUNTER — APPOINTMENT (OUTPATIENT)
Dept: HEMATOLOGY/ONCOLOGY | Facility: CLINIC | Age: 69
End: 2024-01-24
Payer: COMMERCIAL

## 2024-01-24 VITALS
OXYGEN SATURATION: 99 % | DIASTOLIC BLOOD PRESSURE: 71 MMHG | RESPIRATION RATE: 18 BRPM | HEART RATE: 65 BPM | TEMPERATURE: 96.8 F | SYSTOLIC BLOOD PRESSURE: 110 MMHG

## 2024-01-24 DIAGNOSIS — C50.211 MALIGNANT NEOPLASM OF UPPER-INNER QUADRANT OF RIGHT BREAST IN FEMALE, ESTROGEN RECEPTOR POSITIVE (MULTI): ICD-10-CM

## 2024-01-24 DIAGNOSIS — Z17.0 MALIGNANT NEOPLASM OF UPPER-INNER QUADRANT OF RIGHT BREAST IN FEMALE, ESTROGEN RECEPTOR POSITIVE (MULTI): ICD-10-CM

## 2024-01-24 PROCEDURE — 96523 IRRIG DRUG DELIVERY DEVICE: CPT

## 2024-01-24 PROCEDURE — 2500000004 HC RX 250 GENERAL PHARMACY W/ HCPCS (ALT 636 FOR OP/ED): Performed by: NURSE PRACTITIONER

## 2024-01-24 RX ORDER — HEPARIN SODIUM,PORCINE/PF 10 UNIT/ML
50 SYRINGE (ML) INTRAVENOUS AS NEEDED
Status: DISCONTINUED | OUTPATIENT
Start: 2024-01-24 | End: 2024-01-24 | Stop reason: HOSPADM

## 2024-01-24 RX ORDER — HEPARIN SODIUM,PORCINE/PF 10 UNIT/ML
50 SYRINGE (ML) INTRAVENOUS AS NEEDED
Status: CANCELLED | OUTPATIENT
Start: 2024-04-01

## 2024-01-24 RX ORDER — HEPARIN 100 UNIT/ML
500 SYRINGE INTRAVENOUS AS NEEDED
Status: DISCONTINUED | OUTPATIENT
Start: 2024-01-24 | End: 2024-01-24 | Stop reason: HOSPADM

## 2024-01-24 RX ORDER — HEPARIN 100 UNIT/ML
500 SYRINGE INTRAVENOUS AS NEEDED
Status: CANCELLED | OUTPATIENT
Start: 2024-04-01

## 2024-01-24 RX ADMIN — HEPARIN 500 UNITS: 100 SYRINGE at 10:07

## 2024-01-24 ASSESSMENT — PAIN SCALES - GENERAL: PAINLEVEL: 0-NO PAIN

## 2024-02-09 ENCOUNTER — HOSPITAL ENCOUNTER (OUTPATIENT)
Dept: RADIATION ONCOLOGY | Facility: CLINIC | Age: 69
Setting detail: RADIATION/ONCOLOGY SERIES
Discharge: HOME | End: 2024-02-09
Payer: COMMERCIAL

## 2024-02-09 ENCOUNTER — APPOINTMENT (OUTPATIENT)
Dept: RADIATION ONCOLOGY | Facility: CLINIC | Age: 69
End: 2024-02-09
Payer: COMMERCIAL

## 2024-02-09 VITALS
OXYGEN SATURATION: 96 % | DIASTOLIC BLOOD PRESSURE: 68 MMHG | RESPIRATION RATE: 18 BRPM | WEIGHT: 189.15 LBS | BODY MASS INDEX: 31.48 KG/M2 | TEMPERATURE: 97.7 F | SYSTOLIC BLOOD PRESSURE: 100 MMHG | HEART RATE: 76 BPM

## 2024-02-09 DIAGNOSIS — Z17.0 MALIGNANT NEOPLASM OF CENTRAL PORTION OF RIGHT BREAST IN FEMALE, ESTROGEN RECEPTOR POSITIVE (MULTI): Primary | ICD-10-CM

## 2024-02-09 DIAGNOSIS — C50.211 MALIGNANT NEOPLASM OF UPPER-INNER QUADRANT OF RIGHT BREAST IN FEMALE, ESTROGEN RECEPTOR POSITIVE (MULTI): ICD-10-CM

## 2024-02-09 DIAGNOSIS — C50.111 MALIGNANT NEOPLASM OF CENTRAL PORTION OF RIGHT BREAST IN FEMALE, ESTROGEN RECEPTOR POSITIVE (MULTI): Primary | ICD-10-CM

## 2024-02-09 DIAGNOSIS — Z17.0 MALIGNANT NEOPLASM OF UPPER-INNER QUADRANT OF RIGHT BREAST IN FEMALE, ESTROGEN RECEPTOR POSITIVE (MULTI): ICD-10-CM

## 2024-02-09 DIAGNOSIS — Z79.811 AROMATASE INHIBITOR USE: ICD-10-CM

## 2024-02-09 PROCEDURE — 99213 OFFICE O/P EST LOW 20 MIN: CPT | Performed by: NURSE PRACTITIONER

## 2024-02-09 ASSESSMENT — PAIN SCALES - GENERAL: PAINLEVEL: 0-NO PAIN

## 2024-02-09 NOTE — PROGRESS NOTES
Radiation Oncology Follow-Up    Patient Name:  Radha Shabazz  MRN:  [id number]  :  1955    Referring Provider: No ref. 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 The patient's breast cancer was diagnosed on 2021, and was grade 2,  estrogen receptor positive at >95%/>95%, progesterone receptor positive at >95%/>95%%, and HER-2/berta negative.   MammaPrint Index = +0.142; translating to Low Risk Luminal-Type A.      Treatment Summary:      2021: Patient underwent a screening mammogram. This showed a focal asymmetry in the central aspect of the right breast   12/15/2021: Patient underwent a right diagnostic mammogram and breast US. This revealed a mass at the 1:00, 6 cm from the nipple measuring 1.7 x 1.2 x 1.1 cm. Another mass measuring 0.5 cm was seen at 5:00, 4 cm from the nipple. Right axilla US revealed  3 unremarkable LNs. Breast tissue was extremely dense.   2021: Patient underwent US-guided core biopsies from two sites in the right breast, from 1:00, 6 cm from the nipple and from 9:00, 9 cm from the nipple. Pathology revealed above results.   MRI of the breast was attempted but patient could not tolerate it   2022: Patient was started on Letrozole 2.5 mg by mouth daily   4/15/2022: Completed a mid treatment US. This showed a stable to slight interval decrease in size of biopsy proven carcinoma.     06/13/2022: Completed right breast US. This showed interval decrease in size of biopsy proven carcinoma.   09/16/2022: Patient underwent a right mastectomy with SNLB and immediate reconstruction. Pathology revealed a 5.2 cm mass with 3/8 macrometastatic LNs plus one LN with isolated tumor cell. Size of largest LN was 0.3cm. ([id number])   09/17/2022: Evacuation of hematoma with 750 cc of estimated blood loss   10/06/2022: Revision of right reconstructed breast   12/02/2022: Received the first dose of AC   1/13/2023: Received the 4th and last dose of AC after which chemotherapy was discontinued due to side effects and hospitalization. Patient therefore did not receive any Taxane   04/07/2023: Started Exemastane   3/15/23 - 04/18/2023: radiation therapy to the right chest wall, axilla, SCV and IMNs consisting of a dose of 50 Gy.    Adjuvant treatment with exemestane and abemaciclib      SUBJECTIVE  History of Present Illness:   Radha Shabazz is here today for routine radiation follow up/survivorship visit. She is doing well overall. Skin right chest wall healing well and intact. Expander fully expanded. She is now taking anastrozole without adverse effects except some mild arthralgias.  She was unable to tolerate exemestane. She continues abemaciclib. Denies any swelling of right arm or difficulty with ROM or R UE. Denies headaches, fever, chills, cough, SOB, chest pain, GI or  complaints and no bony pain. 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 Endorses fatigue but this is improving.     Review of Systems:    Review of Systems   All other systems reviewed and are negative.    Performance Status:   The Karnofsky performance scale today is 100, Fully active, able to carry on all pre-disease performed without restriction (ECOG equivalent 0).      OBJECTIVE    Current Outpatient Medications:     abemaciclib (Verzenio) 50 mg tablet, Take 1 tablet (50 mg total) by mouth 2 times a day.  Swallow whole., Disp: 60 tablet,  Rfl: 3    anastrozole (Arimidex) 1 mg tablet, Take 1 tablet (1 mg total) by mouth once daily.  Swallow whole with a drink of water., Disp: 30 tablet, Rfl: 5    anastrozole (Arimidex) 1 mg tablet, Take 1 tablet (1 mg total) by mouth once daily.  Swallow whole with a drink of water., Disp: 90 tablet, Rfl: 3    escitalopram (Lexapro) 5 mg tablet, Take 1 tablet (5 mg) by mouth once daily., Disp: , Rfl:     nitrofurantoin, macrocrystal-monohydrate, (Macrobid) 100 mg capsule, Take 1 capsule (100 mg) by mouth every 12 hours. (Patient taking differently: Take 50 mg by mouth every 12 hours.), Disp: 14 capsule, Rfl: 0    omeprazole (PriLOSEC) 20 mg DR capsule, Take 1 capsule (20 mg) by mouth once daily., Disp: , Rfl:     solifenacin (VESIcare) 10 mg tablet, TAKE ONE TABLET BY MOUTH DAILY, Disp: 90 tablet, Rfl: 0    topiramate (Topamax) 25 mg tablet, Take 1 tablet (25 mg) by mouth once daily as needed., Disp: , Rfl:     UNABLE TO FIND, Breast prosthesis for left and/or right breast, Disp: , Rfl:     UNABLE TO FIND, Mastectomy Bra for Right Mastectomy, Disp: , Rfl:     Current Facility-Administered Medications:     aspirin EC tablet 81 mg, 81 mg, oral, Once, Elizabeth Trujillo, APRN-CNP     Physical Exam  Constitutional:       Appearance: Normal appearance.   HENT:      Head: Normocephalic and atraumatic.      Nose: Nose normal.      Mouth/Throat:      Mouth: Mucous membranes are moist.      Pharynx: Oropharynx is clear.   Eyes:      Conjunctiva/sclera: Conjunctivae normal.      Pupils: Pupils are equal, round, and reactive to light.   Cardiovascular:      Rate and Rhythm: Normal rate.   Pulmonary:      Effort: Pulmonary effort is normal.   Chest:   Breasts:     Right: Absent.      Left: Normal. No swelling, bleeding, inverted nipple, mass, nipple discharge or skin change.      Comments: Right tissue expander fully expanded. No palpable chest wall nodules.   Abdominal:      Palpations: Abdomen is soft.   Musculoskeletal:          General: No swelling. Normal range of motion.      Cervical back: Normal range of motion and neck supple.   Lymphadenopathy:      Upper Body:      Right upper body: No supraclavicular or axillary adenopathy.      Left upper body: No supraclavicular or axillary adenopathy.   Skin:     General: Skin is warm and dry.   Neurological:      General: No focal deficit present.      Mental Status: She is alert and oriented to person, place, and time.   Psychiatric:         Mood and Affect: Mood normal.         Behavior: Behavior normal.         RESULTS:  Narrative & Impression   Interpreted By:  Grisel Gao,   STUDY:  BI MAMMO LEFT DIAGNOSTIC TOMOSYNTHESIS;  12/13/2023 10:12 am      ACCESSION NUMBER(S):  [id number]      ORDERING CLINICIAN:  ALEA DYE      INDICATION:  Signs/Symptoms:History of Breast Cancer. Right mastectomy.      COMPARISON:  Left mammogram 11/02/2022, 12/02/2021, 08/31/2020      FINDINGS:  2D and tomosynthesis images were reviewed at 1 mm slice thickness.      Density:  The left breast tissue is heterogeneously dense, which may  obscure small masses.      There are benign round and secretory calcifications. No suspicious  masses or calcifications are identified.      IMPRESSION:  No mammographic evidence of malignancy, left breast.      BI-RADS CATEGORY:      BI-RADS Category:  2 Benign.  Recommendation:  Routine Screening Mammogram in 1 Year.  Recommended Date:  1 Year.  Laterality:  Left.     ASSESSMENT/PLAN:  68 y.o. female with stage IIA right breast cancer s/p neoadjuvant endocrine therapy followed by R mastectomy with immediate reconstruction followed by adjuvant chemotherapy and radiation to chest wall and comprehensive stephanie chain. Doing well. She will continue anastrozole and abemaciclib and follow up with Med Onc.  Radiation follow up in early Fall.  Call with any concerns.   Carol Borja CNP  [phone]

## 2024-03-01 ENCOUNTER — APPOINTMENT (OUTPATIENT)
Dept: UROLOGY | Facility: CLINIC | Age: 69
End: 2024-03-01
Payer: COMMERCIAL

## 2024-03-06 ENCOUNTER — LAB (OUTPATIENT)
Dept: LAB | Facility: LAB | Age: 69
End: 2024-03-06
Payer: COMMERCIAL

## 2024-03-06 ENCOUNTER — INFUSION (OUTPATIENT)
Dept: HEMATOLOGY/ONCOLOGY | Facility: CLINIC | Age: 69
End: 2024-03-06
Payer: COMMERCIAL

## 2024-03-06 VITALS
RESPIRATION RATE: 18 BRPM | DIASTOLIC BLOOD PRESSURE: 77 MMHG | OXYGEN SATURATION: 97 % | SYSTOLIC BLOOD PRESSURE: 125 MMHG | TEMPERATURE: 96.8 F | HEART RATE: 63 BPM

## 2024-03-06 DIAGNOSIS — C50.211 MALIGNANT NEOPLASM OF UPPER-INNER QUADRANT OF RIGHT BREAST IN FEMALE, ESTROGEN RECEPTOR POSITIVE (MULTI): Primary | ICD-10-CM

## 2024-03-06 DIAGNOSIS — Z17.0 MALIGNANT NEOPLASM OF UPPER-INNER QUADRANT OF RIGHT BREAST IN FEMALE, ESTROGEN RECEPTOR POSITIVE (MULTI): ICD-10-CM

## 2024-03-06 DIAGNOSIS — Z17.0 MALIGNANT NEOPLASM OF UPPER-INNER QUADRANT OF RIGHT BREAST IN FEMALE, ESTROGEN RECEPTOR POSITIVE (MULTI): Primary | ICD-10-CM

## 2024-03-06 DIAGNOSIS — C50.211 MALIGNANT NEOPLASM OF UPPER-INNER QUADRANT OF RIGHT BREAST IN FEMALE, ESTROGEN RECEPTOR POSITIVE (MULTI): ICD-10-CM

## 2024-03-06 LAB
ALBUMIN SERPL BCP-MCNC: 4.1 G/DL (ref 3.4–5)
ALP SERPL-CCNC: 63 U/L (ref 33–136)
ALT SERPL W P-5'-P-CCNC: 15 U/L (ref 7–45)
ANION GAP SERPL CALC-SCNC: 10 MMOL/L (ref 10–20)
AST SERPL W P-5'-P-CCNC: 20 U/L (ref 9–39)
BILIRUB SERPL-MCNC: 0.5 MG/DL (ref 0–1.2)
BUN SERPL-MCNC: 21 MG/DL (ref 6–23)
CALCIUM SERPL-MCNC: 9.8 MG/DL (ref 8.6–10.3)
CHLORIDE SERPL-SCNC: 104 MMOL/L (ref 98–107)
CO2 SERPL-SCNC: 27 MMOL/L (ref 21–32)
CREAT SERPL-MCNC: 1.13 MG/DL (ref 0.5–1.05)
EGFRCR SERPLBLD CKD-EPI 2021: 53 ML/MIN/1.73M*2
GLUCOSE SERPL-MCNC: 77 MG/DL (ref 74–99)
POTASSIUM SERPL-SCNC: 4.4 MMOL/L (ref 3.5–5.3)
PROT SERPL-MCNC: 6.3 G/DL (ref 6.4–8.2)
SODIUM SERPL-SCNC: 137 MMOL/L (ref 136–145)

## 2024-03-06 PROCEDURE — 80053 COMPREHEN METABOLIC PANEL: CPT

## 2024-03-06 PROCEDURE — 2500000004 HC RX 250 GENERAL PHARMACY W/ HCPCS (ALT 636 FOR OP/ED): Performed by: INTERNAL MEDICINE

## 2024-03-06 PROCEDURE — 96365 THER/PROPH/DIAG IV INF INIT: CPT | Mod: INF

## 2024-03-06 PROCEDURE — 2500000004 HC RX 250 GENERAL PHARMACY W/ HCPCS (ALT 636 FOR OP/ED): Performed by: NURSE PRACTITIONER

## 2024-03-06 RX ORDER — FAMOTIDINE 10 MG/ML
20 INJECTION INTRAVENOUS ONCE AS NEEDED
OUTPATIENT
Start: 2024-08-21

## 2024-03-06 RX ORDER — DIPHENHYDRAMINE HYDROCHLORIDE 50 MG/ML
50 INJECTION INTRAMUSCULAR; INTRAVENOUS AS NEEDED
Status: DISCONTINUED | OUTPATIENT
Start: 2024-03-06 | End: 2024-03-06 | Stop reason: HOSPADM

## 2024-03-06 RX ORDER — ALBUTEROL SULFATE 0.83 MG/ML
3 SOLUTION RESPIRATORY (INHALATION) AS NEEDED
Status: DISCONTINUED | OUTPATIENT
Start: 2024-03-06 | End: 2024-03-06 | Stop reason: HOSPADM

## 2024-03-06 RX ORDER — ALBUTEROL SULFATE 0.83 MG/ML
3 SOLUTION RESPIRATORY (INHALATION) AS NEEDED
OUTPATIENT
Start: 2024-08-21

## 2024-03-06 RX ORDER — DIPHENHYDRAMINE HYDROCHLORIDE 50 MG/ML
50 INJECTION INTRAMUSCULAR; INTRAVENOUS AS NEEDED
OUTPATIENT
Start: 2024-08-21

## 2024-03-06 RX ORDER — HEPARIN 100 UNIT/ML
500 SYRINGE INTRAVENOUS AS NEEDED
OUTPATIENT
Start: 2024-04-01

## 2024-03-06 RX ORDER — HEPARIN SODIUM,PORCINE/PF 10 UNIT/ML
50 SYRINGE (ML) INTRAVENOUS AS NEEDED
OUTPATIENT
Start: 2024-04-01

## 2024-03-06 RX ORDER — EPINEPHRINE 0.3 MG/.3ML
0.3 INJECTION SUBCUTANEOUS EVERY 5 MIN PRN
OUTPATIENT
Start: 2024-08-21

## 2024-03-06 RX ORDER — EPINEPHRINE 0.3 MG/.3ML
0.3 INJECTION SUBCUTANEOUS EVERY 5 MIN PRN
Status: DISCONTINUED | OUTPATIENT
Start: 2024-03-06 | End: 2024-03-06 | Stop reason: HOSPADM

## 2024-03-06 RX ORDER — HEPARIN 100 UNIT/ML
500 SYRINGE INTRAVENOUS AS NEEDED
Status: DISCONTINUED | OUTPATIENT
Start: 2024-03-06 | End: 2024-03-06 | Stop reason: HOSPADM

## 2024-03-06 RX ORDER — FAMOTIDINE 10 MG/ML
20 INJECTION INTRAVENOUS ONCE AS NEEDED
Status: DISCONTINUED | OUTPATIENT
Start: 2024-03-06 | End: 2024-03-06 | Stop reason: HOSPADM

## 2024-03-06 RX ADMIN — ZOLEDRONIC ACID 4 MG: 4 INJECTION, SOLUTION, CONCENTRATE INTRAVENOUS at 11:08

## 2024-03-06 RX ADMIN — HEPARIN 500 UNITS: 100 SYRINGE at 12:27

## 2024-03-06 RX ADMIN — SODIUM CHLORIDE 500 ML: 9 INJECTION, SOLUTION INTRAVENOUS at 11:50

## 2024-03-06 ASSESSMENT — PAIN SCALES - GENERAL: PAINLEVEL_OUTOF10: 0-NO PAIN

## 2024-03-15 DIAGNOSIS — N20.0 NEPHROLITHIASIS: Primary | ICD-10-CM

## 2024-03-20 ENCOUNTER — LAB (OUTPATIENT)
Dept: LAB | Facility: LAB | Age: 69
End: 2024-03-20
Payer: COMMERCIAL

## 2024-03-20 DIAGNOSIS — Z17.0 MALIGNANT NEOPLASM OF UPPER-OUTER QUADRANT OF BREAST IN FEMALE, ESTROGEN RECEPTOR POSITIVE, UNSPECIFIED LATERALITY (MULTI): ICD-10-CM

## 2024-03-20 DIAGNOSIS — C50.419 MALIGNANT NEOPLASM OF UPPER-OUTER QUADRANT OF BREAST IN FEMALE, ESTROGEN RECEPTOR POSITIVE, UNSPECIFIED LATERALITY (MULTI): Primary | ICD-10-CM

## 2024-03-20 DIAGNOSIS — Z17.0 MALIGNANT NEOPLASM OF UPPER-OUTER QUADRANT OF BREAST IN FEMALE, ESTROGEN RECEPTOR POSITIVE, UNSPECIFIED LATERALITY (MULTI): Primary | ICD-10-CM

## 2024-03-20 DIAGNOSIS — C50.419 MALIGNANT NEOPLASM OF UPPER-OUTER QUADRANT OF BREAST IN FEMALE, ESTROGEN RECEPTOR POSITIVE, UNSPECIFIED LATERALITY (MULTI): ICD-10-CM

## 2024-03-20 LAB
ALBUMIN SERPL BCP-MCNC: 4.4 G/DL (ref 3.4–5)
ALP SERPL-CCNC: 70 U/L (ref 33–136)
ALT SERPL W P-5'-P-CCNC: 15 U/L (ref 7–45)
ANION GAP SERPL CALC-SCNC: 14 MMOL/L (ref 10–20)
AST SERPL W P-5'-P-CCNC: 19 U/L (ref 9–39)
BASOPHILS # BLD AUTO: 0.07 X10*3/UL (ref 0–0.1)
BASOPHILS NFR BLD AUTO: 2.1 %
BILIRUB SERPL-MCNC: 0.5 MG/DL (ref 0–1.2)
BUN SERPL-MCNC: 19 MG/DL (ref 6–23)
CALCIUM SERPL-MCNC: 10.2 MG/DL (ref 8.6–10.3)
CHLORIDE SERPL-SCNC: 103 MMOL/L (ref 98–107)
CO2 SERPL-SCNC: 26 MMOL/L (ref 21–32)
CREAT SERPL-MCNC: 1.01 MG/DL (ref 0.5–1.05)
EGFRCR SERPLBLD CKD-EPI 2021: 61 ML/MIN/1.73M*2
EOSINOPHIL # BLD AUTO: 0.16 X10*3/UL (ref 0–0.7)
EOSINOPHIL NFR BLD AUTO: 4.8 %
ERYTHROCYTE [DISTWIDTH] IN BLOOD BY AUTOMATED COUNT: 12 % (ref 11.5–14.5)
GLUCOSE SERPL-MCNC: 98 MG/DL (ref 74–99)
HCT VFR BLD AUTO: 40.4 % (ref 36–46)
HGB BLD-MCNC: 13.4 G/DL (ref 12–16)
IMM GRANULOCYTES # BLD AUTO: 0.02 X10*3/UL (ref 0–0.7)
IMM GRANULOCYTES NFR BLD AUTO: 0.6 % (ref 0–0.9)
LYMPHOCYTES # BLD AUTO: 0.8 X10*3/UL (ref 1.2–4.8)
LYMPHOCYTES NFR BLD AUTO: 23.9 %
MCH RBC QN AUTO: 32.7 PG (ref 26–34)
MCHC RBC AUTO-ENTMCNC: 33.2 G/DL (ref 32–36)
MCV RBC AUTO: 99 FL (ref 80–100)
MONOCYTES # BLD AUTO: 0.21 X10*3/UL (ref 0.1–1)
MONOCYTES NFR BLD AUTO: 6.3 %
NEUTROPHILS # BLD AUTO: 2.09 X10*3/UL (ref 1.2–7.7)
NEUTROPHILS NFR BLD AUTO: 62.3 %
NRBC BLD-RTO: 0 /100 WBCS (ref 0–0)
PLATELET # BLD AUTO: 195 X10*3/UL (ref 150–450)
POTASSIUM SERPL-SCNC: 4.5 MMOL/L (ref 3.5–5.3)
PROT SERPL-MCNC: 6.6 G/DL (ref 6.4–8.2)
RBC # BLD AUTO: 4.1 X10*6/UL (ref 4–5.2)
SODIUM SERPL-SCNC: 138 MMOL/L (ref 136–145)
WBC # BLD AUTO: 3.4 X10*3/UL (ref 4.4–11.3)

## 2024-03-20 PROCEDURE — 80053 COMPREHEN METABOLIC PANEL: CPT

## 2024-03-20 PROCEDURE — 85025 COMPLETE CBC W/AUTO DIFF WBC: CPT

## 2024-03-22 ENCOUNTER — OFFICE VISIT (OUTPATIENT)
Dept: HEMATOLOGY/ONCOLOGY | Facility: CLINIC | Age: 69
End: 2024-03-22
Payer: COMMERCIAL

## 2024-03-22 VITALS
WEIGHT: 194.78 LBS | SYSTOLIC BLOOD PRESSURE: 140 MMHG | DIASTOLIC BLOOD PRESSURE: 70 MMHG | BODY MASS INDEX: 32.41 KG/M2 | HEART RATE: 82 BPM

## 2024-03-22 DIAGNOSIS — Z79.811 LONG TERM (CURRENT) USE OF AROMATASE INHIBITORS: ICD-10-CM

## 2024-03-22 DIAGNOSIS — C50.211 MALIGNANT NEOPLASM OF UPPER-INNER QUADRANT OF RIGHT BREAST IN FEMALE, ESTROGEN RECEPTOR POSITIVE (MULTI): ICD-10-CM

## 2024-03-22 DIAGNOSIS — Z17.0 MALIGNANT NEOPLASM OF UPPER-INNER QUADRANT OF RIGHT BREAST IN FEMALE, ESTROGEN RECEPTOR POSITIVE (MULTI): ICD-10-CM

## 2024-03-22 PROCEDURE — 1036F TOBACCO NON-USER: CPT | Performed by: INTERNAL MEDICINE

## 2024-03-22 PROCEDURE — 99214 OFFICE O/P EST MOD 30 MIN: CPT | Performed by: INTERNAL MEDICINE

## 2024-03-22 PROCEDURE — 3078F DIAST BP <80 MM HG: CPT | Performed by: INTERNAL MEDICINE

## 2024-03-22 PROCEDURE — 1126F AMNT PAIN NOTED NONE PRSNT: CPT | Performed by: INTERNAL MEDICINE

## 2024-03-22 PROCEDURE — 1159F MED LIST DOCD IN RCRD: CPT | Performed by: INTERNAL MEDICINE

## 2024-03-22 PROCEDURE — 3077F SYST BP >= 140 MM HG: CPT | Performed by: INTERNAL MEDICINE

## 2024-03-22 ASSESSMENT — PAIN SCALES - GENERAL: PAINLEVEL: 0-NO PAIN

## 2024-03-22 NOTE — PROGRESS NOTES
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 The patient's breast cancer was diagnosed on December 21, 2021, and was grade 2,  estrogen receptor positive at >95%/>95%, progesterone receptor positive at >95%/>95%%, and HER-2/berta negative.   MammaPrint Index = +0.142; translating to Low Risk Luminal-Type A.      CURRENT THERAPY: Arimidex and Abemaciclib      Details of her history are as follows:      12/02/2021: Patient underwent a screening mammogram. This showed a focal asymmetry in the central aspect of the right breast   12/15/2021: Patient underwent a right diagnostic mammogram and breast US. This revealed a mass at the 1:00, 6 cm from the nipple measuring 1.7 x 1.2 x 1.1 cm. Another mass measuring 0.5 cm was seen at 5:00, 4 cm from the nipple. Right axilla US revealed  3 unremarkable LNs. Breast tissue was extremely dense.   12/21/2021: Patient underwent US-guided core biopsies from two sites in the right breast, from 1:00, 6 cm from the nipple and from 9:00, 9 cm from the nipple. Pathology revealed above results.   MRI of the breast was attempted but patient could not tolerate it   01/04/2022: Patient was started on Letrozole 2.5 mg by mouth daily   4/15/2022: Completed a mid treatment US. This showed a stable to slight interval decrease in size of biopsy proven carcinoma.    06/13/2022: Completed right breast US. This showed interval decrease in size of biopsy proven carcinoma.   09/16/2022: Patient underwent a right mastectomy with SNLB and immediate reconstruction. Pathology revealed a 5.2 cm mass with 3/8 macrometastatic LNs plus one LN with isolated  tumor cell. Size of largest LN was 0.3cm. ([id number])   09/17/2022: Evacuation of hematoma with 750 cc of estimated blood loss   10/06/2022: Revision of right reconstructed breast   12/02/2022: Received the first dose of AC   1/13/2023: Received the 4th and last dose of AC after which chemotherapy was discontinued due to side effects and hospitalization. Patient therefore did not receive any Taxane   04/07/2023: Started Exemestane   04/13/2023: Completed radiation   06/30/2023: Exemestane discontinued   July 2023? Started Abemaciclib   06/30/2023: Started Anastrozole                       History of Present Illness:      ID Statement:    SHABBIR SHABAZZ is a 68 year old Female        Chief Complaint: Here for a follow-up visit. Today  is C2D14 of Abemaciclib       Interval History:    Ms. 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 The patient's breast cancer was diagnosed on December 21, 2021, and was grade 2,  estrogen receptor positive at >95%/>95%, progesterone receptor positive at >95%/>95%%, and HER-2/berta negative.  MammaPrint  Index = +0.142; translating to Low Risk Luminal-Type A.     She completed 4 cycles of AC on 1/13/2023 after which chemotherapy was discontinued due to side effects and hospitalization. Patient therefore did not receive any Taxane    Ms. Shabazz returns for a follow-up visit. She reports that she continues to feel fatigued. Also has MSK symptoms.   She reports that she is tolerating Arimidex better than Aromasin.     Her last mammogram was a left mammogram completed on 12/13/2023 and was NAD.  Her most recent DEXA was completed on 04/15/2022  and was normal.            Review of Systems:   ·  System Review All other systems have been reviewed and are negative for complaint.            Allergies and Intolerances:        Allergies:         acetaZOLAMIDE: Drug, Unknown, Active         iodinated radiocontrast agents: Drug Category, Unknown, Active         sulfa drugs: Drug Category, Unknown, Active     Outpatient Medication Profile:        Current Outpatient Medications:     escitalopram (Lexapro) 5 mg tablet, Take 1 tablet (5 mg) by mouth once daily., Disp: , Rfl:     nitrofurantoin, macrocrystal-monohydrate, (Macrobid) 100 mg capsule, Take 1 capsule (100 mg) by mouth every 12 hours., Disp: 14 capsule, Rfl: 0    omeprazole (PriLOSEC) 20 mg DR capsule, Take 1 capsule (20 mg) by mouth once daily., Disp: , Rfl:     solifenacin (VESIcare) 10 mg tablet, TAKE ONE TABLET BY MOUTH DAILY, Disp: 90 tablet, Rfl: 0    topiramate (Topamax) 25 mg tablet, Take 1 tablet (25 mg) by mouth once daily as needed., Disp: , Rfl:     abemaciclib (Verzenio) 50 mg tablet, Take 1 tablet (50 mg total) by mouth 2 times a day.  Swallow whole., Disp: 60 tablet, Rfl: 3    anastrozole (Arimidex) 1 mg tablet, Take 1 tablet (1 mg total) by mouth once daily.  Swallow whole with a drink of water., Disp: 30 tablet, Rfl: 5    UNABLE TO FIND, Breast prosthesis for left and/or right breast, Disp: , Rfl:     UNABLE TO FIND, Mastectomy Bra for Right Mastectomy, Disp: , Rfl:      Current Facility-Administered Medications:     aspirin EC tablet 81 mg, 81 mg, oral, Once, Elizabeth Trujillo, APRN-CNP           Surg History:         Goiter: ICD-10: E04.9, Status: Active         Oncology follow-up encounter: ICD-10: Z09, Status: Active         Shortness of breath on exertion: ICD-10: R06.02, Status:  Active         Shortness of breath at rest: ICD-10: R06.02, Status: Active         Abnormal computed tomography of lung: ICD-10: R91.8, Status:  Active         Urinary tract infection without hematuria, site unspecified : ICD-10: N39.0, Status: Active         Chemotherapy-induced fatigue: ICD-10: R53.83, Status: Active         Weakness generalized: ICD-10: R53.1, Status: Active          Malignant neoplasm of lower-outer quadrant of right female breast : ICD-10: C50.511, Status: Active         Shortness of breath at rest: ICD-10: R06.02, Status: Active         Encounter for chemotherapy management: ICD-10: Z51.11, Status:  Active     Family History: No Family History items are recorded  in the problem list.       Social History:   Social Substance History:  ·  Smoking Status never smoker (1)   ·  Additional History     Past medical history: Hypertension, GERD, migraines, shingles, oral tongue cancer.      Past surgical history: Appendectomy, CAYDEN/BSO, partial glossectomy with split thickness skin graft from her left wrist.     Additional Social History:  She is  and accompanied today by her .     Family History:  Patient has a personal history of head and neck cancer. Patient has twopaternal  uncles with history of colon cancer and head and neck cancer     Breast Cancer Risk Factors:  , Had her menarche at age 12 years, 1st pregnancy at age 31 years, TAHBSO for fibroid uterus at age 46 years, used BCP for a 10 years in her 20s and  HRT until current diagnosis for a total of 20 years.  (1)           Vitals and Measurements:   Visit Vitals  /70 (BP Location: Right arm, Patient Position: Sitting, BP Cuff Size: Adult)   Pulse 82   Wt 88.3 kg (194 lb 12.4 oz)   BMI 32.41 kg/m²   OB Status Postmenopausal   Smoking Status Never   BSA 2.01 m²      Physical Exam:      Constitutional: Well developed, awake/alert/oriented  x3, no distress, alert and cooperative, has more energy then at past visits   Eyes: PERRL, clear sclera   Respiratory/Thorax: Patent airways, normal breath  sounds with good chest expansion   Cardiovascular: Regular rate and rhythm, no murmurs;  muffled   Gastrointestinal: Nondistended, soft, non-tender,  no masses palpable, no organomegaly   Musculoskeletal: ROM intact, no joint swelling, normal  strength   Extremities: normal extremities, no cyanosis edema,   contusions or wounds, no clubbing   Neurological: alert and oriented x3, weak   Breast: Right mastectomy with implant reconstruction  is without masses, nodules, skin changes. Left breast without masses, nodules, skin changes, discharge.   Psychological: Appropriate mood and behavior   Skin: Warm and dry, no lesions, no rashes         Lab Results:     Lab Results   Component Value Date    WBC 3.4 (L) 03/20/2024    HGB 13.4 03/20/2024    HCT 40.4 03/20/2024    MCV 99 03/20/2024     03/20/2024        Lab Results   Component Value Date    NEUTROABS 2.09 03/20/2024          Chemistry    Lab Results   Component Value Date/Time     03/20/2024 1451    K 4.5 03/20/2024 1451     03/20/2024 1451    CO2 26 03/20/2024 1451    BUN 19 03/20/2024 1451    CREATININE 1.01 03/20/2024 1451    Lab Results   Component Value Date/Time    CALCIUM 10.2 03/20/2024 1451    ALKPHOS 70 03/20/2024 1451    AST 19 03/20/2024 1451    ALT 15 03/20/2024 1451    BILITOT 0.5 03/20/2024 1451         Radiology Result:     BI mammo left diagnostic tomosynthesis  Status: Final result     PACS Images     Show images for BI mammo left diagnostic tomosynthesis  Signed by    Signed Time Phone Pager   Grisel Gao MD 12/13/2023 10:15 [phone]     Exam Information    Status Exam Begun Exam Ended   Final 12/13/2023 09:45 12/13/2023 10:12     Study Result    Narrative & Impression   Interpreted By:  Grisel Gao,   STUDY:  BI MAMMO LEFT DIAGNOSTIC TOMOSYNTHESIS;  12/13/2023 10:12 am      ACCESSION NUMBER(S):  [id number]      ORDERING CLINICIAN:  ALEA DYE      INDICATION:  Signs/Symptoms:History of Breast Cancer. Right mastectomy.      COMPARISON:  Left mammogram 11/02/2022, 12/02/2021, 08/31/2020      FINDINGS:  2D and tomosynthesis images were reviewed at 1 mm slice thickness.      Density:  The left breast tissue is heterogeneously dense, which may  obscure small masses.      There are benign round and secretory  calcifications. No suspicious  masses or calcifications are identified.      IMPRESSION:  No mammographic evidence of malignancy, left breast.      BI-RADS CATEGORY:      BI-RADS Category:  2 Benign.  Recommendation:  Routine Screening Mammogram in 1 Year.  Recommended Date:  1 Year.  Laterality:  Left.      For any future breast imaging appointments, please call [phone]).                  Assessment and Plan:   Assessment:    Ms. 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 The patient's breast cancer was diagnosed on December 21, 2021, and was grade 2,  estrogen receptor positive at >95%/>95%, progesterone receptor positive at >95%/>95%%, and HER-2/berta negative. MammaPrint  Index = +0.142; translating to Low Risk Luminal-Type A.      Currently on Arimidex and Abemaciclib. Her ANC today is 2090k/ul.         Plan:        Continue Arimidex 1 mg by mouth daily   Continue Abemaciclib to 50mg by BID.    Next Zometa in September   CBC with diff and CMP in 3 months   Left mammogram in 12/2024   DEXA before next visit               Electronically signed by Loretta Robin MD at 12/12/2023  5:15 PM

## 2024-03-25 ENCOUNTER — HOSPITAL ENCOUNTER (OUTPATIENT)
Dept: RADIOLOGY | Facility: HOSPITAL | Age: 69
Discharge: HOME | End: 2024-03-25
Payer: COMMERCIAL

## 2024-03-25 DIAGNOSIS — N20.0 NEPHROLITHIASIS: ICD-10-CM

## 2024-03-25 PROCEDURE — 76770 US EXAM ABDO BACK WALL COMP: CPT | Performed by: STUDENT IN AN ORGANIZED HEALTH CARE EDUCATION/TRAINING PROGRAM

## 2024-03-25 PROCEDURE — 76770 US EXAM ABDO BACK WALL COMP: CPT

## 2024-04-03 ENCOUNTER — OFFICE VISIT (OUTPATIENT)
Dept: OBSTETRICS AND GYNECOLOGY | Facility: CLINIC | Age: 69
End: 2024-04-03
Payer: COMMERCIAL

## 2024-04-03 VITALS — HEART RATE: 77 BPM | SYSTOLIC BLOOD PRESSURE: 115 MMHG | DIASTOLIC BLOOD PRESSURE: 74 MMHG

## 2024-04-03 DIAGNOSIS — Z12.4 CERVICAL CANCER SCREENING: ICD-10-CM

## 2024-04-03 DIAGNOSIS — Z01.419 WELL WOMAN EXAM: Primary | ICD-10-CM

## 2024-04-03 DIAGNOSIS — N39.41 URGE URINARY INCONTINENCE: ICD-10-CM

## 2024-04-03 PROCEDURE — 1126F AMNT PAIN NOTED NONE PRSNT: CPT | Performed by: NURSE PRACTITIONER

## 2024-04-03 PROCEDURE — 1159F MED LIST DOCD IN RCRD: CPT | Performed by: NURSE PRACTITIONER

## 2024-04-03 PROCEDURE — 3074F SYST BP LT 130 MM HG: CPT | Performed by: NURSE PRACTITIONER

## 2024-04-03 PROCEDURE — 3078F DIAST BP <80 MM HG: CPT | Performed by: NURSE PRACTITIONER

## 2024-04-03 PROCEDURE — 88175 CYTOPATH C/V AUTO FLUID REDO: CPT | Mod: TC,GCY | Performed by: NURSE PRACTITIONER

## 2024-04-03 PROCEDURE — 87624 HPV HI-RISK TYP POOLED RSLT: CPT | Performed by: NURSE PRACTITIONER

## 2024-04-03 RX ORDER — SOLIFENACIN SUCCINATE 10 MG/1
10 TABLET, FILM COATED ORAL DAILY
Qty: 90 TABLET | Refills: 3 | Status: SHIPPED | OUTPATIENT
Start: 2024-04-03 | End: 2025-04-03

## 2024-04-03 ASSESSMENT — PAIN SCALES - GENERAL: PAINLEVEL: 0-NO PAIN

## 2024-04-03 NOTE — PROGRESS NOTES
Subjective   Radha Shabazz is a 68 y.o. female who is here for a routine exam.          Current contraception: post menopausal status  Family history of uterine or ovarian cancer: no  Hx of right breast cancer and mastectomy.   Family history of breast cancer: no  Urinary Symptoms: She is out of Vesicare and has noticed worsened urinary sxs. Denies bothersome dry mouth.      Currently going through breast cancer treatment. She went neutropenic and stopped chemo. She was on Neulasta. Patient was in the hospital x1 week and required a blood transfusion. Patient ended up having 6 weeks of radiation and continues to have malignancy. She is now on Verzenio and Anastrozole. She has to take Anastrozole x5 years. Reports she will need to finish Verzenio before having reconstructive surgery. Recently diagnosed with atrial aneurysm. She went back to work in April. Patient says chemo gave her kidney stones, which Dr. Ward follows. They are non obstructing and she does not have hydronephrosis.    Previously had tongue cancer.     No LMP recorded. Patient is postmenopausal.       Review of Systems    Objective   /74   Pulse 77     General:   alert, appears stated age, and cooperative               Vulva: normal   Vagina: normal mucosa, no pain on exam    Cervix: Normal, pap was done   Uterus: Surgically absent          Assessment/Plan   68 y.o. female being assessed for an annual well woman exam. She has OAB. Co morbidities: currently on Verzenio and Anastrozole for breast cancer, s/p radiation and chemotherapy.      Diagnoses:   #1 OAB  #2 Annual well woman     Plan:   1. OAB   - Renewed Vesicare 10 mg 1 tablet daily.     2. Annual well woman   - Pap was taken today.   - Pelvic exam was normal.      Follow-up in 1 year with WALTER Ruiz.     Scribe Attestation:   Mariam WRIGHT, am scribing for virtually, and in the presence of WALTER Ruiz on 4/3/24 at 12:54 PM.      Hetal Blackman  WALTER  I, WALTER Ruiz, personally performed the services described in this documentation which was scribed virtually and I confirm that it is both accurate and complete.

## 2024-04-18 ENCOUNTER — HOSPITAL ENCOUNTER (OUTPATIENT)
Dept: CARDIOLOGY | Facility: CLINIC | Age: 69
Discharge: HOME | End: 2024-04-18
Payer: COMMERCIAL

## 2024-04-18 ENCOUNTER — OFFICE VISIT (OUTPATIENT)
Dept: CARDIOLOGY | Facility: CLINIC | Age: 69
End: 2024-04-18
Payer: COMMERCIAL

## 2024-04-18 DIAGNOSIS — Z79.899 ENCOUNTER FOR MONITORING CARDIOTOXIC DRUG THERAPY: ICD-10-CM

## 2024-04-18 DIAGNOSIS — R53.83 OTHER FATIGUE: Primary | ICD-10-CM

## 2024-04-18 DIAGNOSIS — Z51.81 ENCOUNTER FOR MONITORING CARDIOTOXIC DRUG THERAPY: ICD-10-CM

## 2024-04-18 LAB
EJECTION FRACTION APICAL 4 CHAMBER: 66.5
LEFT ATRIUM VOLUME AREA LENGTH INDEX BSA: 25.2 ML/M2
LEFT VENTRICLE INTERNAL DIMENSION DIASTOLE: 3.71 CM (ref 3.5–6)
LV EJECTION FRACTION BIPLANE: 64 %
MITRAL VALVE E/A RATIO: 0.63
RIGHT VENTRICLE FREE WALL PEAK S': 14 CM/S
TRICUSPID ANNULAR PLANE SYSTOLIC EXCURSION: 2.5 CM

## 2024-04-18 PROCEDURE — 1159F MED LIST DOCD IN RCRD: CPT | Performed by: NURSE PRACTITIONER

## 2024-04-18 PROCEDURE — 3078F DIAST BP <80 MM HG: CPT | Performed by: NURSE PRACTITIONER

## 2024-04-18 PROCEDURE — 3074F SYST BP LT 130 MM HG: CPT | Performed by: NURSE PRACTITIONER

## 2024-04-18 PROCEDURE — 99213 OFFICE O/P EST LOW 20 MIN: CPT | Mod: ZK | Performed by: NURSE PRACTITIONER

## 2024-04-18 PROCEDURE — 2500000004 HC RX 250 GENERAL PHARMACY W/ HCPCS (ALT 636 FOR OP/ED): Performed by: INTERNAL MEDICINE

## 2024-04-18 PROCEDURE — 99213 OFFICE O/P EST LOW 20 MIN: CPT | Performed by: NURSE PRACTITIONER

## 2024-04-18 PROCEDURE — 76376 3D RENDER W/INTRP POSTPROCES: CPT

## 2024-04-18 PROCEDURE — 1036F TOBACCO NON-USER: CPT | Performed by: NURSE PRACTITIONER

## 2024-04-18 RX ADMIN — PERFLUTREN 10 ML OF DILUTION: 6.52 INJECTION, SUSPENSION INTRAVENOUS at 11:29

## 2024-04-22 VITALS
HEART RATE: 72 BPM | DIASTOLIC BLOOD PRESSURE: 77 MMHG | OXYGEN SATURATION: 95 % | BODY MASS INDEX: 32.62 KG/M2 | SYSTOLIC BLOOD PRESSURE: 109 MMHG | WEIGHT: 196 LBS

## 2024-04-22 NOTE — PROGRESS NOTES
Chief Complaint:  Follow-up cardiovascular evaluation and optimization and ongoing monitoring for cardiotoxic medication     HPI:  Presents today for follow up.  Feels good some days, followed by extreme fatigue.  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  On her feet a lot.  She continues to C/O exertional dyspnea, especially when carrying something upstairs.  No overt chest pain, orthopnea, pnd, edema.  Has occasional orthostatic dizziness.  Denies fever, chills, night sweats.     Cancer Diagnosis: SCC of tongue, right breast 12/2021          Treatment: ddAC x4 12/02/2022 to 1/13/2022 +Taxol  Currently on Zometa 50mg   Cumulative Dose: 240 mg/m2  Radiation: No      Current Outpatient Medications   Medication Instructions    anastrozole (ARIMIDEX) 1 mg, oral, Daily, Swallow whole with a drink of water.    anastrozole (ARIMIDEX) 1 mg, oral, Daily, Swallow whole with a drink of water.    escitalopram (Lexapro) 5 mg tablet 1 tablet, oral, Daily    nitrofurantoin, macrocrystal-monohydrate, (Macrobid) 100 mg capsule 100 mg, oral, Every 12 hours scheduled    omeprazole (PRILOSEC) 20 mg, oral, Daily    solifenacin (VESICARE) 10 mg, oral, Daily    topiramate (Topamax) 25 mg tablet 1 tablet, oral, Daily PRN    UNABLE TO FIND Breast prosthesis for left and/or right breast    UNABLE TO FIND Mastectomy Bra for Right Mastectomy     PE:  alert oriented, nAD  Lungs ctab  Heart RRR S1S2 no m/r/g neck veins flat  Abd benign  No edema     A/P:  67 yo CF on long term zometa, having routine echoes.  Continues to c/o exertional dyspnea without associated symptoms.  Normal BNP.  We discussed intentional exercise and it's impact on diastolic dysfunction.  Recommend her to start exerting herself in small increments, that increase HR and breathing, slowly progressing.  Remains normotensive.    - exercise stress test   - progress activity   - continue asa  - continue low sugar diet, eliminate processed  foods  - follow up onco echo and visit in 6 months

## 2024-04-23 NOTE — PROGRESS NOTES
Subjective     This visit was completed via telemedicine. All issues as below were discussed and addressed but no physical exam was performed unless allowed by visual confirmation. If it was felt that the patient should be evaluated in clinic, then they were directed there. Patient verbally consented to visit.    Radha Shabazz is a 68 y.o. female with bilateral nephrolithiasis, breast cancer, tongue cancer and HTN. Her surgical history is significant for mastectomy, appendectomy and salpingo-oophorectomy. She presents today to review renal US. Patient has no new complaints.         Past Medical History:   Diagnosis Date    Breast cancer (Multi)     Encounter for gynecological examination (general) (routine) without abnormal findings     Encounter for gynecological examination without abnormal finding    Encounter for screening mammogram for malignant neoplasm of breast     Visit for screening mammogram    Personal history of irradiation     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    Personal history of other diseases of the circulatory system     History of hypertension     Past Surgical History:   Procedure Laterality Date    APPENDECTOMY  10/05/2015    Appendectomy    HYSTERECTOMY  10/05/2015    Supracervical Hysterectomy    MASTECTOMY Right 2022    OTHER SURGICAL HISTORY  06/27/2022    Tongue surgery    OTHER SURGICAL HISTORY  06/27/2022    Neck dissection modified radical    OTHER SURGICAL HISTORY  08/04/2020    Split thickness skin graft    TOTAL ABDOMINAL HYSTERECTOMY W/ BILATERAL SALPINGOOPHORECTOMY  10/05/2015    Salpingo-oophorectomy Bilateral     Family History   Problem Relation Name Age of Onset    Aortic stenosis Mother      Diabetes Father      Stomach cancer Father      Colon cancer Father's Brother      Cancer Other       Current Outpatient Medications   Medication Sig Dispense Refill    anastrozole (Arimidex) 1 mg tablet Take 1 tablet (1 mg total) by  mouth once daily.  Swallow whole with a drink of water. 30 tablet 5    anastrozole (Arimidex) 1 mg tablet Take 1 tablet (1 mg total) by mouth once daily.  Swallow whole with a drink of water. 90 tablet 3    escitalopram (Lexapro) 5 mg tablet Take 1 tablet (5 mg) by mouth once daily.      nitrofurantoin, macrocrystal-monohydrate, (Macrobid) 100 mg capsule Take 1 capsule (100 mg) by mouth every 12 hours. (Patient taking differently: Take 50 mg by mouth every 12 hours.) 14 capsule 0    omeprazole (PriLOSEC) 20 mg DR capsule Take 1 capsule (20 mg) by mouth once daily.      solifenacin (VESIcare) 10 mg tablet Take 1 tablet (10 mg) by mouth once daily. 90 tablet 3    topiramate (Topamax) 25 mg tablet Take 1 tablet (25 mg) by mouth once daily as needed.      UNABLE TO FIND Breast prosthesis for left and/or right breast      UNABLE TO FIND Mastectomy Bra for Right Mastectomy       Current Facility-Administered Medications   Medication Dose Route Frequency Provider Last Rate Last Admin    aspirin EC tablet 81 mg  81 mg oral Once Elizabeth Trujillo, APRN-CNP         Allergies   Allergen Reactions    Iodine Hives    Iodinated Contrast Media Rash, Swelling and Unknown    Sulfa (Sulfonamide Antibiotics) Rash, Swelling, Unknown and Hives     Social History     Socioeconomic History    Marital status:      Spouse name: Not on file    Number of children: Not on file    Years of education: Not on file    Highest education level: Not on file   Occupational History    Not on file   Tobacco Use    Smoking status: Never    Smokeless tobacco: Never   Substance and Sexual Activity    Alcohol use: Not on file    Drug use: Not on file    Sexual activity: Not on file   Other Topics Concern    Not on file   Social History Narrative    Not on file     Social Determinants of Health     Financial Resource Strain: Not on file   Food Insecurity: Not on file   Transportation Needs: Not on file   Physical Activity: Not on file   Stress: Not  on file   Social Connections: Not on file   Intimate Partner Violence: Not on file   Housing Stability: Not on file       Review of Systems  Pertinent items are noted in HPI.    Objective     Lab Review  Lab Results   Component Value Date    WBC 3.4 (L) 03/20/2024    RBC 4.10 03/20/2024    HGB 13.4 03/20/2024    HCT 40.4 03/20/2024     03/20/2024      Lab Results   Component Value Date    BUN 19 03/20/2024    CREATININE 1.01 03/20/2024          Assessment/Plan   There are no diagnoses linked to this encounter.    Bilateral Nephrolithiasis     I reviewed renal US from 3/26/2024 which showed multiple bilateral nonobstructing renal calculi, largest in the right kidney measuring 5 mm and largest in the left kidney measuring 5 mm. No hydronephrosis.      We discussed that most calculi under 5 mm can be safely observed. This recommendation is based on large series looking at spontaneous passage rates that suggest that the likelihood of stone passage is highest for stones under 4 mm in size (approximately 70-80%), moderate for stones between 4 and 6 mm (50%), and lowest for stones 6 mm or greater (20%-30%).      However, stones under 5 mm that are in a solitary kidney, associated with infection or causing significant obstruction should be removed to prevent loss of renal function and/or sepsis. Also, every patient has different anatomy and different ability to pass calculi and tolerate pain, so intervention is also recommended in patients with small stones that are in significant discomfort or that have a history of being unable to pass small calculi. Patient understands and desires to proceed.    We will follow up annually with renal US to monitor stone presence and formation.       All questions were answered to the patient's satisfaction. Patient agrees with the plan and wishes to proceed. Follow-up will be scheduled appropriately.     E&M visit today is associated with current or anticipated ongoing medical care  services related to a patient's single, serious condition or a complex condition.    Scribed for Dr. Ward by Delaney Garcia. I , Dr Ward, have personally reviewed and agreed with the information entered by the Virtual Scribe.

## 2024-04-24 ENCOUNTER — TELEMEDICINE (OUTPATIENT)
Dept: UROLOGY | Facility: CLINIC | Age: 69
End: 2024-04-24
Payer: COMMERCIAL

## 2024-04-24 DIAGNOSIS — N20.0 NEPHROLITHIASIS: ICD-10-CM

## 2024-04-24 PROCEDURE — 99213 OFFICE O/P EST LOW 20 MIN: CPT | Performed by: UROLOGY

## 2024-04-24 PROCEDURE — G2211 COMPLEX E/M VISIT ADD ON: HCPCS | Performed by: UROLOGY

## 2024-04-24 PROCEDURE — 1159F MED LIST DOCD IN RCRD: CPT | Performed by: UROLOGY

## 2024-05-03 ENCOUNTER — INFUSION (OUTPATIENT)
Dept: HEMATOLOGY/ONCOLOGY | Facility: CLINIC | Age: 69
End: 2024-05-03
Payer: COMMERCIAL

## 2024-05-03 VITALS
SYSTOLIC BLOOD PRESSURE: 129 MMHG | HEART RATE: 64 BPM | TEMPERATURE: 96.3 F | RESPIRATION RATE: 18 BRPM | OXYGEN SATURATION: 97 % | DIASTOLIC BLOOD PRESSURE: 81 MMHG

## 2024-05-03 DIAGNOSIS — C50.211 MALIGNANT NEOPLASM OF UPPER-INNER QUADRANT OF RIGHT BREAST IN FEMALE, ESTROGEN RECEPTOR POSITIVE (MULTI): ICD-10-CM

## 2024-05-03 DIAGNOSIS — Z17.0 MALIGNANT NEOPLASM OF UPPER-INNER QUADRANT OF RIGHT BREAST IN FEMALE, ESTROGEN RECEPTOR POSITIVE (MULTI): ICD-10-CM

## 2024-05-03 PROCEDURE — 96523 IRRIG DRUG DELIVERY DEVICE: CPT

## 2024-05-03 ASSESSMENT — PAIN SCALES - GENERAL: PAINLEVEL: 0-NO PAIN

## 2024-06-12 NOTE — PROGRESS NOTES
Patient Visit Information:   Patient name: Radha Shabazz  : 1955  Date of Service: 2024    Visit Type: Follow-up      Cancer History:          Breast         AJCC Edition: 8th (AJCC), Diagnosis Date:          Cancer Staging   No matching staging information was found for the patient.       Treatment Synopsis:       Radha Shabazz is a 69 y.o. postmenopausal  female with prior history of head and neck cancer (SCC of the tongue) now with right-sided multifocal  invasive lobular carcinoma,  clinical stage IIA (cT3 N0 M0). The patient's breast cancer was diagnosed on 2021, and was grade 2,  estrogen receptor positive at >95%/>95%, progesterone receptor positive at >95%/>95%%, and HER-2/berta negative.   MammaPrint Index = +0.142; translating to Low Risk Luminal-Type A.      CURRENT THERAPY:  Adjuvant Arimidex and Abemaciclib      ONCOLOGIC HISTORY:  Details of her breast cancer history are as follows:     2021: Patient underwent a screening mammogram. This showed a focal asymmetry in the central aspect of the right breast   12/15/2021: Patient underwent a right diagnostic mammogram and breast US. This revealed a mass at the 1:00, 6 cm from the nipple measuring 1.7 x 1.2 x 1.1 cm. Another mass measuring 0.5 cm was seen at 5:00, 4 cm from the nipple. Right axilla US revealed  3 unremarkable LNs. Breast tissue was extremely dense.   2021: Patient underwent US-guided core biopsies from two sites in the right breast, from 1:00, 6 cm from the nipple and from 9:00, 9 cm from the nipple. Pathology revealed above results.   MRI of the breast was attempted but patient could not tolerate it   2022: Patient was started on Letrozole 2.5 mg by mouth daily   4/15/2022: Completed a mid treatment US. This showed a stable to slight interval decrease in size of biopsy proven carcinoma.    2022: Completed right breast US. This showed interval decrease in size of biopsy proven  carcinoma.   09/16/2022: Patient underwent a right mastectomy with SNLB and immediate reconstruction. Pathology revealed a 5.2 cm mass with 3/8 macrometastatic LNs plus one LN with isolated tumor cell. Size of largest LN was 0.3cm. ([id number])   09/17/2022: Evacuation of hematoma with 750 cc of estimated blood loss   10/06/2022: Revision of right reconstructed breast   12/02/2022: Received the first dose of AC   1/13/2023: Received the 4th and last dose of AC after which chemotherapy was discontinued due to side effects and hospitalization. Patient therefore did not receive any Taxane   04/07/2023: Started Exemestane   04/13/2023: Completed radiation   06/30/2023: Exemestane discontinued   06/30/2023: Started Anastrozole  July 2023? Started Abemaciclib  (getting 's supply - [facility])  3/6/2024: Started Zometa (every 24 weeks)        History of Present Illness: Patient ID:  Radha Shabazz is a 69 y.o. y.o. female.     Chief Complaint and/or reason for visit: Here for a follow-up     Interval History:    Radha Shabazz is a pleasant  69 y.o. 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 The patient's breast cancer was diagnosed on December 21, 2021, and was grade 2,  estrogen receptor positive at >95%/>95%, progesterone receptor positive at >95%/>95%%, and HER-2/berta negative.  MammaPrint  Index = +0.142; translating to Low Risk Luminal-Type A.     She completed 4 cycles of AC on 1/13/2023 after which chemotherapy was discontinued due to side effects and hospitalization. Patient therefore did not receive any Taxane     Here for a follow-up and establishing care with me as a new breast medical oncologist.     She reports that she continues to feel fatigued. Also has MSK symptoms, especially joint pain.  Her knees are the worst (right knee more than left, rating this as  7-8/10). She is getting her R knee replaced in the future.     She gets SOB upon going up stairs. Cardio-oncology is following her.   She complains of 10 lb weight gain, which she want to shed off.     She is a nurse (pain management), works 2 days a week.     She denies fever, chills, Wt loss, headaches, CP, N/V, abdominal pain, constipation, diarrhea, neuropathy, extremity swelling, rashes. Appetite is good and she is drinking fine.       Review of Systems:   A 14-point review of system was completed and was negative except for what is noted in HPI.      Allergies and Intolerances:       Allergies:   Allergies   Allergen Reactions    Iodine Hives    Iodinated Contrast Media Rash, Swelling and Unknown    Sulfa (Sulfonamide Antibiotics) Rash, Swelling, Unknown and Hives             Outpatient Medication Profile:   Current Outpatient Medications on File Prior to Visit   Medication Sig Dispense Refill    anastrozole (Arimidex) 1 mg tablet Take 1 tablet (1 mg total) by mouth once daily.  Swallow whole with a drink of water. 30 tablet 5    anastrozole (Arimidex) 1 mg tablet Take 1 tablet (1 mg total) by mouth once daily.  Swallow whole with a drink of water. 90 tablet 3    escitalopram (Lexapro) 5 mg tablet Take 1 tablet (5 mg) by mouth once daily.      omeprazole (PriLOSEC) 20 mg DR capsule Take 1 capsule (20 mg) by mouth once daily.      solifenacin (VESIcare) 10 mg tablet Take 1 tablet (10 mg) by mouth once daily. 90 tablet 3    topiramate (Topamax) 25 mg tablet Take 1 tablet (25 mg) by mouth once daily as needed.      UNABLE TO FIND Breast prosthesis for left and/or right breast      UNABLE TO FIND Mastectomy Bra for Right Mastectomy      [DISCONTINUED] nitrofurantoin, macrocrystal-monohydrate, (Macrobid) 100 mg capsule Take 1 capsule (100 mg) by mouth every 12 hours. (Patient taking differently: Take 50 mg by mouth every 12 hours.) 14 capsule 0     Current Facility-Administered Medications on File Prior to Visit    Medication Dose Route Frequency Provider Last Rate Last Admin    aspirin EC tablet 81 mg  81 mg oral Once Elizabeth Trujillo, APRN-CNP           Medical History:    Past Medical History:   Diagnosis Date    Breast cancer (Multi)     Encounter for gynecological examination (general) (routine) without abnormal findings     Encounter for gynecological examination without abnormal finding    Encounter for screening mammogram for malignant neoplasm of breast     Visit for screening mammogram    Personal history of irradiation     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    Personal history of other diseases of the circulatory system     History of hypertension       Surgical History:   Past Surgical History:   Procedure Laterality Date    APPENDECTOMY  10/05/2015    Appendectomy    HYSTERECTOMY  10/05/2015    Supracervical Hysterectomy    MASTECTOMY Right 2022    OTHER SURGICAL HISTORY  06/27/2022    Tongue surgery    OTHER SURGICAL HISTORY  06/27/2022    Neck dissection modified radical    OTHER SURGICAL HISTORY  08/04/2020    Split thickness skin graft    TOTAL ABDOMINAL HYSTERECTOMY W/ BILATERAL SALPINGOOPHORECTOMY  10/05/2015    Salpingo-oophorectomy Bilateral       Social Substance History:   Social History     Tobacco Use    Smoking status: Never    Smokeless tobacco: Never   Substance Use Topics    Alcohol use: Not on file     Social History     Substance and Sexual Activity   Drug Use Not on file       Family History:   Family History   Problem Relation Name Age of Onset    Aortic stenosis Mother      Diabetes Father      Stomach cancer Father      Colon cancer Father's Brother      Cancer Other          OBJECTIVE:     Visit Vitals      2/9/2024    11:42 AM 3/6/2024     9:00 AM 3/22/2024    10:06 AM 4/3/2024    11:21 AM 4/22/2024     2:31 PM 5/3/2024     9:03 AM 6/14/2024     1:00 PM   Vitals   Systolic 100 125 140 115 109 129 120   Diastolic 68 77 70 74 77 81 79  "  Heart Rate 76 63 82 77 72 64 65   Temp 36.5 °C (97.7 °F) 36 °C (96.8 °F)    35.7 °C (96.3 °F)    Resp 18 18    18    Height (in)       1.654 m (5' 5.12\")    Weight (lb) 189.15  194.78  196  196.76   BMI 31.48 kg/m2  32.41 kg/m2  32.62 kg/m2  32.62 kg/m2   BSA (m2) 1.98 m2  2.01 m2  2.02 m2  2.03 m2   Visit Report   Report Report   Report       Significant value         Pain Scale: 7-8/10      The ECOG performance scale today is Asymptomatic/0       Physical Exam:      Constitutional: Well developed, awake/alert/oriented  x3, no distress, alert and cooperative   Eyes: PERRL, EOMI, clear sclera   ENMT: mucous membranes moist, no apparent injury,  no lesions seen   Head/Neck: Neck supple, no apparent injury, thyroid  without mass or tenderness, No JVD, trachea midline, no bruits   Respiratory/Thorax: Patent airways, CTAB, normal  breath sounds with good chest expansion, thorax symmetric   Cardiovascular: Regular, rate and rhythm, no murmurs,  2+ equal pulses of the extremities, normal S 1and S 2   Gastrointestinal: Nondistended, soft, non-tender,  no rebound tenderness or guarding, no masses palpable, no organomegaly, +BS, no bruits   Musculoskeletal: ROM intact, no joint swelling, normal  strength   Extremities: No LE edema   Neurological: alert and oriented x3, intact senses,  motor, response and reflexes, normal strength   Breast: s/p Right mastectomy with implant reconstruction with well healed surgical incision. No palpable mass in the left breast. No palpable axillary or supraclavicular lymphadenopathies bilaterally. No swelling of either upper extremity which had free range of motion.   Lymphatic: No significant lymphadenopathy   Psychological: Appropriate mood and behavior   Skin: Warm and dry, no lesions, no rashes          LAB RESULTS    Lab Results   Component Value Date    WBC 3.4 (L) 03/20/2024    HGB 13.4 03/20/2024    HCT 40.4 03/20/2024    MCV 99 03/20/2024     03/20/2024     Lab Results " "  Component Value Date    NEUTROABS 2.09 03/20/2024       No results for input(s): \"CREATININE\", \"BUN\", \"NA\", \"K\", \"CL\", \"CO2\" in the last 72 hours.  No components found for: \"CALCGFR\"  Lab Results   Component Value Date    GLUCOSE 98 03/20/2024     Lab Results   Component Value Date     03/20/2024    K 4.5 03/20/2024     03/20/2024    CO2 26 03/20/2024    BUN 19 03/20/2024    CREATININE 1.01 03/20/2024    ALT 15 03/20/2024    AST 19 03/20/2024    ALKPHOS 70 03/20/2024    BILITOT 0.5 03/20/2024     No results found for: \"FOLATE\"  No results found for: \"[id number]\"  Lab Results   Component Value Date    TSH 0.77 02/15/2023        Imaging:     LASTIMG@        No images are attached to the encounter.        Assessment and Plan:   John Shabazz is a 69 y.o. 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 The patient's breast cancer was diagnosed on December 21, 2021, and was grade 2,  estrogen receptor positive at >95%/>95%, progesterone receptor positive at >95%/>95%%, and HER-2/berta negative. MammaPrint  Index = +0.142; translating to Low Risk Luminal-Type A.     She completed 4 cycles of AC on 1/13/2023 after which chemotherapy was discontinued due to side effects and hospitalization. Patient therefore did not receive any Taxane     Currently on Arimidex and Abemaciclib.   Her abemaciclib was started as 100mg BID, which caused her counts to drop. Her counts are stable at the current dose of 50 mg BID.     Her ANC today is 2.02.  CMP showed stable creatinine 1.22   (baseline 1.15).   AST/ALT - wnl     Last DEXA was 04/15/2022 - Normal     Plan  Continue Arimidex 1 mg by mouth daily (To complete 5 years in 6/30/2028. If 10 years, 6/30/2033)   Continue symptom management for joint pain. She is already on third AI. She states she can tolerate. Agreed to do " at least 5 years of AI.   Continue Abemaciclib to 50mg by BID. PharmD requested refill ([facility] providing the med)   Next Zometa on 8/18/2024.   CBC with diff and CMP every 3 months.  Next on 9/5/2024 before she come back on 9/6/2024.   Left mammogram in 12/2024, showing BIRAD-2 (benign).  Next left mammogram ordered by Dr. Robin on 3/24/2024 to be done in one year (Dec of 2024).  DEXA is scheduled for 6/24/2024  She has SOB upon exertion.  Cardio oncology is following.   RTC in 3 months with lab and evaluation for continuing Abemaciclib and Arimidex  She will be on vacation. Per her request, gave one script of Macrobid (UTI prophylaxis) for recurrent UTI. She will request her primary provider for further scripts.   She has a port. Port flushing order placed for July.   MD visit on 9/6/2024 with labs on 9/5/2024      Anna Olson MD, PhD   Hematology/Oncology  [facility]

## 2024-06-14 ENCOUNTER — ONCOLOGY MEDICATION OUTREACH (OUTPATIENT)
Dept: HEMATOLOGY/ONCOLOGY | Facility: CLINIC | Age: 69
End: 2024-06-14

## 2024-06-14 ENCOUNTER — OFFICE VISIT (OUTPATIENT)
Dept: HEMATOLOGY/ONCOLOGY | Facility: CLINIC | Age: 69
End: 2024-06-14
Payer: COMMERCIAL

## 2024-06-14 ENCOUNTER — LAB (OUTPATIENT)
Dept: LAB | Facility: LAB | Age: 69
End: 2024-06-14
Payer: COMMERCIAL

## 2024-06-14 ENCOUNTER — APPOINTMENT (OUTPATIENT)
Dept: HEMATOLOGY/ONCOLOGY | Facility: CLINIC | Age: 69
End: 2024-06-14
Payer: COMMERCIAL

## 2024-06-14 VITALS
SYSTOLIC BLOOD PRESSURE: 120 MMHG | WEIGHT: 196.76 LBS | DIASTOLIC BLOOD PRESSURE: 79 MMHG | BODY MASS INDEX: 32.78 KG/M2 | HEART RATE: 65 BPM | HEIGHT: 65 IN

## 2024-06-14 DIAGNOSIS — C50.211 MALIGNANT NEOPLASM OF UPPER-INNER QUADRANT OF RIGHT BREAST IN FEMALE, ESTROGEN RECEPTOR POSITIVE (MULTI): ICD-10-CM

## 2024-06-14 DIAGNOSIS — N39.0 RECURRENT UTI: ICD-10-CM

## 2024-06-14 DIAGNOSIS — Z17.0 MALIGNANT NEOPLASM OF UPPER-INNER QUADRANT OF RIGHT BREAST IN FEMALE, ESTROGEN RECEPTOR POSITIVE (MULTI): Primary | ICD-10-CM

## 2024-06-14 DIAGNOSIS — C50.211 MALIGNANT NEOPLASM OF UPPER-INNER QUADRANT OF RIGHT BREAST IN FEMALE, ESTROGEN RECEPTOR POSITIVE (MULTI): Primary | ICD-10-CM

## 2024-06-14 DIAGNOSIS — Z17.0 MALIGNANT NEOPLASM OF UPPER-INNER QUADRANT OF RIGHT BREAST IN FEMALE, ESTROGEN RECEPTOR POSITIVE (MULTI): ICD-10-CM

## 2024-06-14 LAB
ALBUMIN SERPL BCP-MCNC: 4.2 G/DL (ref 3.4–5)
ALP SERPL-CCNC: 52 U/L (ref 33–136)
ALT SERPL W P-5'-P-CCNC: 18 U/L (ref 7–45)
ANION GAP SERPL CALC-SCNC: 11 MMOL/L (ref 10–20)
AST SERPL W P-5'-P-CCNC: 22 U/L (ref 9–39)
BASOPHILS # BLD AUTO: 0.07 X10*3/UL (ref 0–0.1)
BASOPHILS NFR BLD AUTO: 2 %
BILIRUB SERPL-MCNC: 0.7 MG/DL (ref 0–1.2)
BUN SERPL-MCNC: 26 MG/DL (ref 6–23)
CALCIUM SERPL-MCNC: 10.4 MG/DL (ref 8.6–10.3)
CHLORIDE SERPL-SCNC: 106 MMOL/L (ref 98–107)
CO2 SERPL-SCNC: 26 MMOL/L (ref 21–32)
CREAT SERPL-MCNC: 1.22 MG/DL (ref 0.5–1.05)
EGFRCR SERPLBLD CKD-EPI 2021: 48 ML/MIN/1.73M*2
EOSINOPHIL # BLD AUTO: 0.1 X10*3/UL (ref 0–0.7)
EOSINOPHIL NFR BLD AUTO: 2.9 %
ERYTHROCYTE [DISTWIDTH] IN BLOOD BY AUTOMATED COUNT: 12.5 % (ref 11.5–14.5)
GLUCOSE SERPL-MCNC: 84 MG/DL (ref 74–99)
HCT VFR BLD AUTO: 37.1 % (ref 36–46)
HGB BLD-MCNC: 12.6 G/DL (ref 12–16)
IMM GRANULOCYTES # BLD AUTO: 0.01 X10*3/UL (ref 0–0.7)
IMM GRANULOCYTES NFR BLD AUTO: 0.3 % (ref 0–0.9)
LYMPHOCYTES # BLD AUTO: 0.94 X10*3/UL (ref 1.2–4.8)
LYMPHOCYTES NFR BLD AUTO: 27 %
MCH RBC QN AUTO: 33.7 PG (ref 26–34)
MCHC RBC AUTO-ENTMCNC: 34 G/DL (ref 32–36)
MCV RBC AUTO: 99 FL (ref 80–100)
MONOCYTES # BLD AUTO: 0.34 X10*3/UL (ref 0.1–1)
MONOCYTES NFR BLD AUTO: 9.8 %
NEUTROPHILS # BLD AUTO: 2.02 X10*3/UL (ref 1.2–7.7)
NEUTROPHILS NFR BLD AUTO: 58 %
NRBC BLD-RTO: 0 /100 WBCS (ref 0–0)
PLATELET # BLD AUTO: 179 X10*3/UL (ref 150–450)
POTASSIUM SERPL-SCNC: 4.7 MMOL/L (ref 3.5–5.3)
PROT SERPL-MCNC: 6.9 G/DL (ref 6.4–8.2)
RBC # BLD AUTO: 3.74 X10*6/UL (ref 4–5.2)
SODIUM SERPL-SCNC: 138 MMOL/L (ref 136–145)
WBC # BLD AUTO: 3.5 X10*3/UL (ref 4.4–11.3)

## 2024-06-14 PROCEDURE — 3074F SYST BP LT 130 MM HG: CPT | Performed by: INTERNAL MEDICINE

## 2024-06-14 PROCEDURE — 99215 OFFICE O/P EST HI 40 MIN: CPT | Performed by: INTERNAL MEDICINE

## 2024-06-14 PROCEDURE — 3078F DIAST BP <80 MM HG: CPT | Performed by: INTERNAL MEDICINE

## 2024-06-14 PROCEDURE — 85025 COMPLETE CBC W/AUTO DIFF WBC: CPT

## 2024-06-14 PROCEDURE — 1126F AMNT PAIN NOTED NONE PRSNT: CPT | Performed by: INTERNAL MEDICINE

## 2024-06-14 PROCEDURE — 80053 COMPREHEN METABOLIC PANEL: CPT

## 2024-06-14 RX ORDER — NITROFURANTOIN 25; 75 MG/1; MG/1
100 CAPSULE ORAL EVERY 12 HOURS SCHEDULED
Qty: 14 CAPSULE | Refills: 0 | Status: SHIPPED | OUTPATIENT
Start: 2024-06-14

## 2024-06-14 ASSESSMENT — PAIN SCALES - GENERAL: PAINLEVEL: 0-NO PAIN

## 2024-06-14 NOTE — PATIENT INSTRUCTIONS
Please stop and have your labs drawn today  Please schedule a follow up visit with us in 3 months.   Please have your labs drawn the day before your next visit.    Please call us at [phone] option 5 then option 2 with any questions or concerns    Please schedule a follow up visit with us in 4 weeks .

## 2024-06-24 ENCOUNTER — HOSPITAL ENCOUNTER (OUTPATIENT)
Dept: RADIOLOGY | Facility: CLINIC | Age: 69
Discharge: HOME | End: 2024-06-24
Payer: COMMERCIAL

## 2024-06-24 DIAGNOSIS — Z79.811 LONG TERM (CURRENT) USE OF AROMATASE INHIBITORS: ICD-10-CM

## 2024-06-24 DIAGNOSIS — Z17.0 MALIGNANT NEOPLASM OF UPPER-INNER QUADRANT OF RIGHT BREAST IN FEMALE, ESTROGEN RECEPTOR POSITIVE (MULTI): ICD-10-CM

## 2024-06-24 DIAGNOSIS — C50.211 MALIGNANT NEOPLASM OF UPPER-INNER QUADRANT OF RIGHT BREAST IN FEMALE, ESTROGEN RECEPTOR POSITIVE (MULTI): ICD-10-CM

## 2024-06-24 PROCEDURE — 77080 DXA BONE DENSITY AXIAL: CPT

## 2024-07-08 ENCOUNTER — INFUSION (OUTPATIENT)
Dept: HEMATOLOGY/ONCOLOGY | Facility: CLINIC | Age: 69
End: 2024-07-08
Payer: COMMERCIAL

## 2024-07-08 VITALS
RESPIRATION RATE: 18 BRPM | HEART RATE: 80 BPM | DIASTOLIC BLOOD PRESSURE: 76 MMHG | TEMPERATURE: 95.7 F | OXYGEN SATURATION: 96 % | SYSTOLIC BLOOD PRESSURE: 112 MMHG

## 2024-07-08 DIAGNOSIS — Z17.0 MALIGNANT NEOPLASM OF UPPER-INNER QUADRANT OF RIGHT BREAST IN FEMALE, ESTROGEN RECEPTOR POSITIVE (MULTI): ICD-10-CM

## 2024-07-08 DIAGNOSIS — C50.211 MALIGNANT NEOPLASM OF UPPER-INNER QUADRANT OF RIGHT BREAST IN FEMALE, ESTROGEN RECEPTOR POSITIVE (MULTI): ICD-10-CM

## 2024-07-08 PROCEDURE — 96523 IRRIG DRUG DELIVERY DEVICE: CPT

## 2024-07-08 ASSESSMENT — PAIN SCALES - GENERAL: PAINLEVEL: 0-NO PAIN

## 2024-07-08 NOTE — PROGRESS NOTES
Pt presenting within their normal limits, no complaints or concerns at this time, pt assessed and educated on plan of care for the day, pt given support for future medical encounters

## 2024-07-10 ENCOUNTER — APPOINTMENT (OUTPATIENT)
Dept: PLASTIC SURGERY | Facility: CLINIC | Age: 69
End: 2024-07-10
Payer: COMMERCIAL

## 2024-07-10 VITALS
SYSTOLIC BLOOD PRESSURE: 127 MMHG | DIASTOLIC BLOOD PRESSURE: 85 MMHG | HEIGHT: 65 IN | HEART RATE: 90 BPM | BODY MASS INDEX: 32.74 KG/M2

## 2024-07-10 DIAGNOSIS — C50.111 MALIGNANT NEOPLASM OF CENTRAL PORTION OF RIGHT BREAST IN FEMALE, ESTROGEN RECEPTOR POSITIVE (MULTI): Primary | ICD-10-CM

## 2024-07-10 DIAGNOSIS — Z17.0 MALIGNANT NEOPLASM OF CENTRAL PORTION OF RIGHT BREAST IN FEMALE, ESTROGEN RECEPTOR POSITIVE (MULTI): Primary | ICD-10-CM

## 2024-07-10 PROCEDURE — 3079F DIAST BP 80-89 MM HG: CPT | Performed by: PHYSICIAN ASSISTANT

## 2024-07-10 PROCEDURE — 1159F MED LIST DOCD IN RCRD: CPT | Performed by: PHYSICIAN ASSISTANT

## 2024-07-10 PROCEDURE — 99213 OFFICE O/P EST LOW 20 MIN: CPT | Performed by: PHYSICIAN ASSISTANT

## 2024-07-10 PROCEDURE — 3074F SYST BP LT 130 MM HG: CPT | Performed by: PHYSICIAN ASSISTANT

## 2024-07-10 NOTE — PROGRESS NOTES
Department of Plastic and Reconstructive Surgery            FOLLOW UP Visit    Date: 7/10/2024   Date of Surgery: 9/26/22, 9/17/2022, 10/6/2022    Subjective   Radha Shabazz is a 69 y.o. female  she is status post right mastectomy by Dr. Abraham followed by immediate right breast reconstruction performed on September 16, 2022, complicated by postoperative hematoma status post return the OR for hematoma evacuation and replacement of prepectoral tissue expander construct and ADM on September 17, 2022, she is now status post right breast revision, excision of non-vitalized tissue of the vertical limb of the incision, performed on October 6, 2022.    She was last seen I our office on 12/11/23. At that visit she discussed with Dr Dickson radiation induced skin changes. They discussed stage 2 reconstruction with implant vs free BETTY. She was more interested in BETTY at that time but was to follow up after visit to further discuss.     She is on chemo medications and cannot undergo extensive surgery or microsurgery until she is off chemo medications as per the recommendation of her oncologist. She presents today to discuss possible capsular contracture. She endorses over the last 2 months the right breast has started to feel more tight and is now pulling and firm and causes her pain. 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 She is interested in BETTY reconstruction next year once she has completed her medication therapy. She endorses that the pain is becoming sever and daily and she is interested in Tissue expander removal.           Objective    Vitals:    07/10/24 1120   BP: 127/85   Pulse: 90       Gen: interactive and pleasant  Head: NCAT  Eyes: EOMI, PERRLA  Mouth: MMM  Throat: trachea midline  Cor: RRR  Pulm: nonlabored breathing  Abd: s/nt/nd  Neuro: AAOx3  Ext: extremities perfused    Body mass index is 32.74 kg/m².      Focused exam of the:  Right breast skin shows improvement in radiation-induced changes, however there is still is very glossy appearance to the skin with firmness. There is tightness and tenderness to palpation over the superior aspect of the tissue expander with obvious deformity from likely capsular contracture. There is baker grade 3 capsular contracture.     Assessment/Plan       Radha is a 69 y.o. female who presents for she is status post right mastectomy by Dr. Abraham followed by immediate right breast reconstruction performed on September 16, 2022, complicated by postoperative hematoma status post return the OR for hematoma evacuation and replacement of prepectoral tissue expander construct and ADM on September 17, 2022, she is now status post right breast revision, excision of non-vitalized tissue of the vertical limb of the incision, performed on October 6, 2022.    She presents today with right breast pain. There is Baker grade 3 capsular contracture of there right breast. She has a TE in place filled to 720cc. I offered her treatment with singulair. She is more interested in TE removal at this time. She is planning for BETTY reconstruction in 1 year once she has completed chemo medications. Her oncologist advised against extensive surgery/microsurgery until she completed therapy.     Plan:   She is interested in TE removal, I will discuss this with Dr Dickson. She will likely need cardiac clearance for surgery to remove tissue expander and capsule.     I spent 20 minutes with this patient. Greater than 50% of this time was spent in the counselling and/or coordination of care of this patient.  This note was created using voice recognition software and was not corrected for typographical or grammatical errors.    Signature: Sarai Sandra PA-C

## 2024-07-15 ENCOUNTER — HOSPITAL ENCOUNTER (OUTPATIENT)
Dept: RADIOLOGY | Facility: CLINIC | Age: 69
Discharge: HOME | End: 2024-07-15
Payer: COMMERCIAL

## 2024-07-15 ENCOUNTER — APPOINTMENT (OUTPATIENT)
Dept: ORTHOPEDIC SURGERY | Facility: CLINIC | Age: 69
End: 2024-07-15
Payer: COMMERCIAL

## 2024-07-15 ENCOUNTER — HOSPITAL ENCOUNTER (OUTPATIENT)
Dept: RADIOLOGY | Facility: EXTERNAL LOCATION | Age: 69
Discharge: HOME | End: 2024-07-15

## 2024-07-15 DIAGNOSIS — M17.11 PRIMARY OSTEOARTHRITIS OF RIGHT KNEE: ICD-10-CM

## 2024-07-15 DIAGNOSIS — M17.12 ARTHRITIS OF LEFT KNEE: Primary | ICD-10-CM

## 2024-07-15 DIAGNOSIS — M25.562 LEFT KNEE PAIN, UNSPECIFIED CHRONICITY: ICD-10-CM

## 2024-07-15 PROCEDURE — 73562 X-RAY EXAM OF KNEE 3: CPT | Mod: RT

## 2024-07-15 PROCEDURE — 99214 OFFICE O/P EST MOD 30 MIN: CPT | Performed by: STUDENT IN AN ORGANIZED HEALTH CARE EDUCATION/TRAINING PROGRAM

## 2024-07-15 PROCEDURE — 20611 DRAIN/INJ JOINT/BURSA W/US: CPT | Performed by: STUDENT IN AN ORGANIZED HEALTH CARE EDUCATION/TRAINING PROGRAM

## 2024-07-15 PROCEDURE — L1812 KO ELASTIC W/JOINTS PRE OTS: HCPCS | Performed by: STUDENT IN AN ORGANIZED HEALTH CARE EDUCATION/TRAINING PROGRAM

## 2024-07-15 PROCEDURE — 73560 X-RAY EXAM OF KNEE 1 OR 2: CPT | Mod: LT

## 2024-07-15 RX ORDER — ROPIVACAINE HYDROCHLORIDE 5 MG/ML
3 INJECTION, SOLUTION EPIDURAL; INFILTRATION; PERINEURAL
Status: COMPLETED | OUTPATIENT
Start: 2024-07-15 | End: 2024-07-15

## 2024-07-15 RX ORDER — METHYLPREDNISOLONE ACETATE 40 MG/ML
40 INJECTION, SUSPENSION INTRA-ARTICULAR; INTRALESIONAL; INTRAMUSCULAR; SOFT TISSUE
Status: COMPLETED | OUTPATIENT
Start: 2024-07-15 | End: 2024-07-15

## 2024-07-15 NOTE — PROGRESS NOTES
REFERRAL SOURCE: No ref. 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     Previous history:  7/11/22: She underwent a right knee Durolane injection under ultrasound guidance.    Interval history:  7/15/2024: She is here for follow-up of right knee pain and complains of new problem of left knee pain.  Her right knee pain improved for about a year after she received the Durling injection at the last visit.  She has had subsequent return of her pain.  Her pain is worse in the right, but now she is developing pain subsequently on the left.  Pain is medial and achy.  It gets up to an 8/10 with activity.  She is on an aromatase inhibitor that is causing some other joint pains and swelling in her hands and she is wondering if this is contributing to her symptoms as well.  She is using topical Voltaren gel bilaterally and a knee sleeve on the right knee.  She is taken ibuprofen and Tylenol.  She had previously had corticosteroid injections on the right knee that were only lasting for a few weeks.  She has not had any corticosteroid injections on the left knee.    MEDS    Current Outpatient Medications:     abemaciclib (Verzenio) 50 mg tablet, Take 1 tablet (50 mg total) by mouth 2 times a day.  Swallow whole., Disp: 60 tablet, Rfl: 3    anastrozole (Arimidex) 1 mg tablet, Take 1 tablet (1 mg total) by mouth once daily.  Swallow whole with a drink of water., Disp: 30 tablet, Rfl: 5    anastrozole (Arimidex) 1 mg tablet, Take 1 tablet (1 mg total) by mouth once daily.  Swallow whole with a drink of water., Disp: 90 tablet, Rfl: 3    escitalopram (Lexapro) 5 mg tablet, Take 1 tablet (5 mg) by mouth once daily., Disp: , Rfl:     nitrofurantoin, macrocrystal-monohydrate, (Macrobid) 100 mg capsule, Take 1 capsule (100 mg) by mouth every 12 hours.,  Disp: 14 capsule, Rfl: 0    omeprazole (PriLOSEC) 20 mg DR capsule, Take 1 capsule (20 mg) by mouth once daily., Disp: , Rfl:     solifenacin (VESIcare) 10 mg tablet, Take 1 tablet (10 mg) by mouth once daily., Disp: 90 tablet, Rfl: 3    topiramate (Topamax) 25 mg tablet, Take 1 tablet (25 mg) by mouth once daily as needed., Disp: , Rfl:     UNABLE TO FIND, Breast prosthesis for left and/or right breast, Disp: , Rfl:     UNABLE TO FIND, Mastectomy Bra for Right Mastectomy, Disp: , Rfl:     Current Facility-Administered Medications:     aspirin EC tablet 81 mg, 81 mg, oral, Once, Elizabeth Trujillo, APRN-CNP    ALLERGIES  Allergies   Allergen Reactions    Iodine Hives    Iodinated Contrast Media Rash, Swelling and Unknown    Sulfa (Sulfonamide Antibiotics) Rash, Swelling, Unknown and Hives       PAST MEDICAL HISTORY  Past Medical History:   Diagnosis Date    Breast cancer (Multi)     Encounter for gynecological examination (general) (routine) without abnormal findings     Encounter for gynecological examination without abnormal finding    Encounter for screening mammogram for malignant neoplasm of breast     Visit for screening mammogram    Personal history of irradiation     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    Personal history of other diseases of the circulatory system     History of hypertension       PAST SURGICAL HISTORY  Past Surgical History:   Procedure Laterality Date    APPENDECTOMY  10/05/2015    Appendectomy    HYSTERECTOMY  10/05/2015    Supracervical Hysterectomy    MASTECTOMY Right 2022    OTHER SURGICAL HISTORY  06/27/2022    Tongue surgery    OTHER SURGICAL HISTORY  06/27/2022    Neck dissection modified radical    OTHER SURGICAL HISTORY  08/04/2020    Split thickness skin graft    TOTAL ABDOMINAL HYSTERECTOMY W/ BILATERAL SALPINGOOPHORECTOMY  10/05/2015    Salpingo-oophorectomy Bilateral       SOCIAL HISTORY   Social History     Socioeconomic  History    Marital status:      Spouse name: Not on file    Number of children: Not on file    Years of education: Not on file    Highest education level: Not on file   Occupational History    Not on file   Tobacco Use    Smoking status: Never    Smokeless tobacco: Never   Substance and Sexual Activity    Alcohol use: Not on file    Drug use: Not on file    Sexual activity: Not on file   Other Topics Concern    Not on file   Social History Narrative    Not on file     Social Determinants of Health     Financial Resource Strain: Not on file   Food Insecurity: Not on file   Transportation Needs: Not on file   Physical Activity: Not on file   Stress: Not on file   Social Connections: Not on file   Intimate Partner Violence: Not on file   Housing Stability: Not on file       FAMILY HISTORY  Family History   Problem Relation Name Age of Onset    Aortic stenosis Mother      Diabetes Father      Stomach cancer Father      Colon cancer Father's Brother      Cancer Other         REVIEW OF SYSTEMS  Except for those mentioned in the history of present illness, and below, a complete review of systems is negative.     Review of Systems    VITALS  There were no vitals filed for this visit.    PHYSICAL EXAMINATION   GENERAL:  Awake, alert, and oriented, no apparent distress, pleasant, and cooperative  PSYC: Mood is euthymic, affect is congruent  EAR, NOSE, THROAT:  Normocephalic, atraumatic, moist membranes, anicteric sclera  LUNG: Nonlabored breathing  HEART: No clubbing or cyanosis  SKIN: No increased erythema, warmth, rashes, or concerning skin lesions  NEURO: Sensation is intact in the bilateral lower extremities. Strength is grossly 5 out of 5 throughout the bilateral lower extremities, unless noted below.  GAIT: Mildly-antalgic  MUSCULOSKELETAL: Examination of the bilateral knee: Range of motion is 0-1 20.  Patellar crepitus.  Tender palpation of the medial joint lines.  Laxity with valgus stress returns alignment to  neutral on the right.    IMAGING STUDIES:   Radiographs of the right knee dated 5/18/2022 - severe medial compartment, moderate patellofemoral, and mild lateral compartment osteoarthrosis.     Radiographs of bilateral knees dated 7/15/2024 were personally reviewed and interpreted by me, Dr. Venita Yañez, and the findings shared with the patient.  Severe right knee degenerative change, worse in the medial compartment.  Moderate-severe left knee degenerative change, worse in the medial compartment.     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  -The diagnosis of knee arthritis was discussed in detail. The only cure for arthritis is a knee replacement. Without curing arthritis, we can improve the pain that the patient experiences and hopefully allow them to continue to do the activities that they enjoy.  Due to the medications that she is on for her breast cancer and complications that she had after her mastectomy, she was instructed not to have a large surgery until at least the summer 2025.  -Physical therapy: Work on hip abductor, knee extensor, core strengthening and flexibility with PT. The patient was given a referral to physical therapy today.  -Weight loss and physical activity: The benefits of weight loss and physical activity on improving pain experienced with arthritis were discussed.  -Bracing: Knee bracing can be considered.  -Medications: Continue to take Tylenol, NSAIDs, and use topical Voltaren gel over the counter.   -Injections: Injections, such as corticosteroid, viscosupplementation, and PRP can be utilized. The pros, cons, risks, benefits,  pre-procedure and post-procedure protocols were discussed.  She did get 1 year of relief with a Durling injection into the right knee and would like to have this repeated.  Will begin the authorization process and order the injection.  She has not had any corticosteroid injections on the left, and we will start with this first under ultrasound guidance.  Please see procedure note section for complete details.  -Follow-up possible right knee Durolane injection.    The patient was counseled to remain active, but avoid activities that worsen symptoms. The patient was in agreement with this plan. All questions were answered to the best of my ability.    PATIENT EDUCATION:  Education was discussed at today's appointment. A learning needs assessment was performed.    Primary learner: Radha Shabazz  Barriers to learning: None  Preferred language: English  Learning preferences include: Seeing and doing.  Discussed: Diagnosis and treatment plan.  Demonstrated: Understanding of material discussed.  Patient education materials given: None.  Learner response: Learner demonstrated understanding.    This note was dictated using Dragon speech recognition software and was not corrected for spelling or grammatical errors.      Large Jt Asp/Inj: L knee on 7/15/2024 10:05 AM  Details: ultrasound-guided  Medications: 40 mg methylPREDNISolone acetate 40 mg/mL; 3 mL ropivacaine 5 mg/mL (0.5 %)    Pre-Procedure Diagnosis: left knee pain, left knee osteoarthritis  Post-Procedure Diagnosis: left knee pain, left knee osteoarthritis    Procedure: US-guided left knee corticosteroid injection    History Present Illness: Radha Shabazz is a pleasant 69 y.o. female with knee pain secondary to osteoarthritis who is here for the above procedure for improved pain control.     Medications and allergies were reviewed with the patient. No contraindications were identified.      Informed Consent:   Following denial of allergy and review of potential  side effects and complications including but not necessarily limited to infection, allergic reaction, local tissue breakdown, systemic effects of corticosteroids, elevation of blood glucose, injury to soft tissue and/or nerves and seizure, the patient indicated understanding and agreed to proceed. Written consent to treatment was obtained and the patient verbalized consent for the procedure.    Procedural Details  The use of direct ultrasound visualization of the needle (rather than a non-guided injection) was required to increase patient safety by excluding inadvertent intramuscular or intratendinous placement and minimizing bleeding by avoiding osteochondral or vascular injury from the needle.  Additionally, the increased accuracy of placement may increase clinical effectiveness and will allow higher diagnostic specificity when evaluating effectiveness of this injection.    Using ultrasound, a pre-scan of the region was performed to identify the target structure.     The area was prepped with chlorhexidine, then re-examined using the same transducer, a sterile ultrasound transducer cover, and sterile ultrasound gel.    Procedural pause conducted to verify:  correct patient identity, procedure to be performed, and as applicable, correct side and site, correct patient position, and availability of implants, special equipment, or special requirements    Procedure:  Transducer: Linear array transducer.  Patient position: Supine with the knee bent to 30 degrees.  Localization process: The suprapatellar recess was localized in a short axis view.  Local anesthesia: No local anesthesia was used.  Needle: A 27-gauge, 1.5-inch needle was used for the injectate.  Approach: A lateral to medial, in plane, approach was used to guide the needle tip into the suprapatellar recess deep to the quadriceps tendon and superficial to the prefemoral fat pad.   Injection/Aspiration: A mixture of 3 cc of 0.5% ropivocaine and 1 cc of  DepoMedrol (40mg/mL) was injected into the left knee without complication.    Post-procedural care: The patient tolerated the procedure well and reported complete pain relief during the anesthetic phase. The patient was asked to ice for improved pain control and avoid submerging the area in water for the next 48 hours to help reduce the risk of infection. The patient was instructed to call the office immediately if there are any questions or concerns.     PATIENT EDUCATION:  Education of the diagnosis and treatment plan was discussed at today's appointment. A learning needs assessment was performed. The patient demonstrated understanding.

## 2024-07-29 ENCOUNTER — APPOINTMENT (OUTPATIENT)
Dept: PLASTIC SURGERY | Facility: CLINIC | Age: 69
End: 2024-07-29
Payer: COMMERCIAL

## 2024-07-29 DIAGNOSIS — Z17.0 MALIGNANT NEOPLASM OF CENTRAL PORTION OF RIGHT BREAST IN FEMALE, ESTROGEN RECEPTOR POSITIVE (MULTI): Primary | ICD-10-CM

## 2024-07-29 DIAGNOSIS — C50.111 MALIGNANT NEOPLASM OF CENTRAL PORTION OF RIGHT BREAST IN FEMALE, ESTROGEN RECEPTOR POSITIVE (MULTI): Primary | ICD-10-CM

## 2024-07-29 PROCEDURE — 99213 OFFICE O/P EST LOW 20 MIN: CPT | Performed by: SURGERY

## 2024-07-29 NOTE — PROGRESS NOTES
Department of Plastic and Reconstructive Surgery            FOLLOW UP Visit    Date: 7/29/2024   Date of Surgery: 9/26/22, 9/17/2022, 10/6/2022    Subjective   Radha Shabazz is a 69 y.o. female  she is status post right mastectomy by Dr. Abraham followed by immediate right breast reconstruction performed on September 16, 2022, complicated by postoperative hematoma status post return the OR for hematoma evacuation and replacement of prepectoral tissue expander construct and ADM on September 17, 2022, she is now status post right breast revision, excision of non-vitalized tissue of the vertical limb of the incision, performed on October 6, 2022.    She was last seen I our office on 12/11/23. At that visit she discussed with Dr Dickson radiation induced skin changes. They discussed stage 2 reconstruction with implant vs free BETTY. She was more interested in BETTY at that time but was to follow up after visit to further discuss.     She is on chemo medications and cannot undergo extensive surgery or microsurgery until she is off chemo medications as per the recommendation of her oncologist. She presents today to discuss possible capsular contracture. She endorses over the last 2 months the right breast has started to feel more tight and is now pulling and firm and causes her pain. 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 She is interested in BETTY reconstruction next year once she has completed her medication therapy. She endorses that the pain is becoming sever and daily and she is interested in Tissue expander removal.           Objective    There were no vitals filed for this visit.  NO EXAM. VIRTUAL VISIT.   From previous, clinical photography reviewed:       Focused exam of the: Right breast skin shows improvement in radiation-induced changes, however there is still is very glossy appearance to the skin with firmness. There is tightness and  tenderness to palpation over the superior aspect of the tissue expander with obvious deformity from likely capsular contracture. There is baker grade 3 capsular contracture.     Assessment/Plan       Radha is a 69 y.o. female who presents for she is status post right mastectomy by Dr. Abraham followed by immediate right breast reconstruction performed on September 16, 2022, complicated by postoperative hematoma status post return the OR for hematoma evacuation and replacement of prepectoral tissue expander construct and ADM on September 17, 2022, she is now status post right breast revision, excision of non-vitalized tissue of the vertical limb of the incision, performed on October 6, 2022.     She has a TE in place filled to 720cc. I offered her treatment with singulair. She is more interested in TE removal at this time. She is planning for BETTY reconstruction in 1 year once she has completed chemo medications. Her oncologist advised against extensive surgery/microsurgery until she completed therapy.     She is interested in TE removal, I will discuss this with Dr Dickson. She will likely need cardiac clearance for surgery to remove tissue expander and capsule.     She is currently on Abemaciclib -- with plans to complete in July 2025.  Abemaciclib has been shown to decelerate wound healing, as it acts as a CDK inhibitor, selected for CDK4 and CDK 6.      Plan:   I had a long discussion with Alysia today indicating that it is probably best to wait until she has completed her adjuvant chemotherapy.  Therefore we will plan to reengage around springtime 2025 to plan for unilateral BETTY flap, ostensibly to be performed in the summer 2025    I spent 20 minutes with this patient. Greater than 50% of this time was spent in the counselling and/or coordination of care of this patient.  This note was created using voice recognition software and was not corrected for typographical or grammatical errors.    Signature: Armando ERIC  MD Yessi

## 2024-08-01 DIAGNOSIS — M17.11 PRIMARY OSTEOARTHRITIS OF RIGHT KNEE: ICD-10-CM

## 2024-08-02 RX ORDER — HYALURONATE SODIUM, STABILIZED 60 MG/3 ML
SYRINGE (ML) INTRAARTICULAR
Qty: 3 ML | Refills: 0 | Status: SHIPPED | OUTPATIENT
Start: 2024-08-02

## 2024-08-08 ENCOUNTER — APPOINTMENT (OUTPATIENT)
Dept: PLASTIC SURGERY | Facility: CLINIC | Age: 69
End: 2024-08-08
Payer: COMMERCIAL

## 2024-08-21 ENCOUNTER — TELEPHONE (OUTPATIENT)
Dept: HEMATOLOGY/ONCOLOGY | Facility: CLINIC | Age: 69
End: 2024-08-21
Payer: COMMERCIAL

## 2024-08-21 DIAGNOSIS — Z17.0 MALIGNANT NEOPLASM OF UPPER-INNER QUADRANT OF RIGHT BREAST IN FEMALE, ESTROGEN RECEPTOR POSITIVE (MULTI): Primary | ICD-10-CM

## 2024-08-21 DIAGNOSIS — C50.211 MALIGNANT NEOPLASM OF UPPER-INNER QUADRANT OF RIGHT BREAST IN FEMALE, ESTROGEN RECEPTOR POSITIVE (MULTI): Primary | ICD-10-CM

## 2024-08-21 NOTE — TELEPHONE ENCOUNTER
Called patient about missed 9:00 am infusion appointment with no answer. Message left for patient to call back to reschedule appointment.

## 2024-08-26 ENCOUNTER — INFUSION (OUTPATIENT)
Dept: HEMATOLOGY/ONCOLOGY | Facility: CLINIC | Age: 69
End: 2024-08-26
Payer: COMMERCIAL

## 2024-08-26 ENCOUNTER — SOCIAL WORK (OUTPATIENT)
Dept: CASE MANAGEMENT | Facility: HOSPITAL | Age: 69
End: 2024-08-26

## 2024-08-26 VITALS
OXYGEN SATURATION: 96 % | WEIGHT: 196.65 LBS | RESPIRATION RATE: 18 BRPM | TEMPERATURE: 97.7 F | BODY MASS INDEX: 32.72 KG/M2 | DIASTOLIC BLOOD PRESSURE: 80 MMHG | SYSTOLIC BLOOD PRESSURE: 123 MMHG | HEART RATE: 64 BPM

## 2024-08-26 DIAGNOSIS — Z17.0 MALIGNANT NEOPLASM OF UPPER-INNER QUADRANT OF RIGHT BREAST IN FEMALE, ESTROGEN RECEPTOR POSITIVE (MULTI): ICD-10-CM

## 2024-08-26 DIAGNOSIS — C50.211 MALIGNANT NEOPLASM OF UPPER-INNER QUADRANT OF RIGHT BREAST IN FEMALE, ESTROGEN RECEPTOR POSITIVE (MULTI): ICD-10-CM

## 2024-08-26 LAB
ANION GAP SERPL CALC-SCNC: 14 MMOL/L (ref 10–20)
BUN SERPL-MCNC: 23 MG/DL (ref 6–23)
CA-I BLD-SCNC: 1.32 MMOL/L (ref 1.1–1.33)
CHLORIDE SERPL-SCNC: 107 MMOL/L (ref 98–107)
CO2 SERPL-SCNC: 21 MMOL/L (ref 21–32)
CREAT SERPL-MCNC: 1.2 MG/DL (ref 0.6–1.3)
GFR SERPL CREATININE-BSD FRML MDRD: 49 ML/MIN/1.73M*2
GLUCOSE SERPL-MCNC: 88 MG/DL (ref 74–99)
POTASSIUM SERPL-SCNC: 3.9 MMOL/L (ref 3.5–5.3)
SODIUM SERPL-SCNC: 138 MMOL/L (ref 136–145)

## 2024-08-26 PROCEDURE — 2500000004 HC RX 250 GENERAL PHARMACY W/ HCPCS (ALT 636 FOR OP/ED): Performed by: INTERNAL MEDICINE

## 2024-08-26 PROCEDURE — 96365 THER/PROPH/DIAG IV INF INIT: CPT | Mod: INF

## 2024-08-26 PROCEDURE — 2500000004 HC RX 250 GENERAL PHARMACY W/ HCPCS (ALT 636 FOR OP/ED): Performed by: NURSE PRACTITIONER

## 2024-08-26 PROCEDURE — 80047 BASIC METABLC PNL IONIZED CA: CPT | Performed by: INTERNAL MEDICINE

## 2024-08-26 RX ORDER — HEPARIN 100 UNIT/ML
500 SYRINGE INTRAVENOUS AS NEEDED
OUTPATIENT
Start: 2024-10-01

## 2024-08-26 RX ORDER — EPINEPHRINE 0.3 MG/.3ML
0.3 INJECTION SUBCUTANEOUS EVERY 5 MIN PRN
OUTPATIENT
Start: 2025-02-02

## 2024-08-26 RX ORDER — EPINEPHRINE 0.3 MG/.3ML
0.3 INJECTION SUBCUTANEOUS EVERY 5 MIN PRN
Status: DISCONTINUED | OUTPATIENT
Start: 2024-08-26 | End: 2024-08-26 | Stop reason: HOSPADM

## 2024-08-26 RX ORDER — HEPARIN 100 UNIT/ML
500 SYRINGE INTRAVENOUS AS NEEDED
Status: DISCONTINUED | OUTPATIENT
Start: 2024-08-26 | End: 2024-08-26 | Stop reason: HOSPADM

## 2024-08-26 RX ORDER — DIPHENHYDRAMINE HYDROCHLORIDE 50 MG/ML
50 INJECTION INTRAMUSCULAR; INTRAVENOUS AS NEEDED
OUTPATIENT
Start: 2025-02-02

## 2024-08-26 RX ORDER — HEPARIN SODIUM,PORCINE/PF 10 UNIT/ML
50 SYRINGE (ML) INTRAVENOUS AS NEEDED
Status: DISCONTINUED | OUTPATIENT
Start: 2024-08-26 | End: 2024-08-26 | Stop reason: HOSPADM

## 2024-08-26 RX ORDER — ALBUTEROL SULFATE 0.83 MG/ML
3 SOLUTION RESPIRATORY (INHALATION) AS NEEDED
Status: DISCONTINUED | OUTPATIENT
Start: 2024-08-26 | End: 2024-08-26 | Stop reason: HOSPADM

## 2024-08-26 RX ORDER — FAMOTIDINE 10 MG/ML
20 INJECTION INTRAVENOUS ONCE AS NEEDED
OUTPATIENT
Start: 2025-02-02

## 2024-08-26 RX ORDER — HEPARIN SODIUM,PORCINE/PF 10 UNIT/ML
50 SYRINGE (ML) INTRAVENOUS AS NEEDED
OUTPATIENT
Start: 2024-10-01

## 2024-08-26 RX ORDER — FAMOTIDINE 10 MG/ML
20 INJECTION INTRAVENOUS ONCE AS NEEDED
Status: DISCONTINUED | OUTPATIENT
Start: 2024-08-26 | End: 2024-08-26 | Stop reason: HOSPADM

## 2024-08-26 RX ORDER — DIPHENHYDRAMINE HYDROCHLORIDE 50 MG/ML
50 INJECTION INTRAMUSCULAR; INTRAVENOUS AS NEEDED
Status: DISCONTINUED | OUTPATIENT
Start: 2024-08-26 | End: 2024-08-26 | Stop reason: HOSPADM

## 2024-08-26 RX ORDER — ALBUTEROL SULFATE 0.83 MG/ML
3 SOLUTION RESPIRATORY (INHALATION) AS NEEDED
OUTPATIENT
Start: 2025-02-02

## 2024-08-26 ASSESSMENT — PAIN SCALES - GENERAL: PAINLEVEL: 0-NO PAIN

## 2024-08-26 NOTE — PROGRESS NOTES
SW met with patient today at [facility]. Patient received assistance from previous SW with Verzenio free drug assistance through [employer] [phone]. Patient has been approved through 12/31/24, for breast cancer. Patient reports she has to take this medication until July and then will be scheduled for reconstructive surgery. Patient spoke about her cancer journeys. Patient was diagnosis with tongue cancer 5 years ago. Patient is working as a nurse at [employer]. Patient remains positive. SW provided support and will continue to follow. Sw provided contact information to patient.

## 2024-09-04 ENCOUNTER — LAB (OUTPATIENT)
Dept: LAB | Facility: LAB | Age: 69
End: 2024-09-04
Payer: COMMERCIAL

## 2024-09-04 DIAGNOSIS — Z17.0 MALIGNANT NEOPLASM OF UPPER-INNER QUADRANT OF RIGHT BREAST IN FEMALE, ESTROGEN RECEPTOR POSITIVE (MULTI): ICD-10-CM

## 2024-09-04 DIAGNOSIS — C50.211 MALIGNANT NEOPLASM OF UPPER-INNER QUADRANT OF RIGHT BREAST IN FEMALE, ESTROGEN RECEPTOR POSITIVE (MULTI): ICD-10-CM

## 2024-09-04 LAB
ALBUMIN SERPL BCP-MCNC: 4 G/DL (ref 3.4–5)
ALP SERPL-CCNC: 61 U/L (ref 33–136)
ALT SERPL W P-5'-P-CCNC: 19 U/L (ref 7–45)
ANION GAP SERPL CALC-SCNC: 16 MMOL/L (ref 10–20)
AST SERPL W P-5'-P-CCNC: 25 U/L (ref 9–39)
BASOPHILS # BLD AUTO: 0.06 X10*3/UL (ref 0–0.1)
BASOPHILS NFR BLD AUTO: 1.9 %
BILIRUB SERPL-MCNC: 0.8 MG/DL (ref 0–1.2)
BUN SERPL-MCNC: 22 MG/DL (ref 6–23)
CALCIUM SERPL-MCNC: 9.5 MG/DL (ref 8.6–10.3)
CHLORIDE SERPL-SCNC: 104 MMOL/L (ref 98–107)
CO2 SERPL-SCNC: 21 MMOL/L (ref 21–32)
CREAT SERPL-MCNC: 1.1 MG/DL (ref 0.5–1.05)
EGFRCR SERPLBLD CKD-EPI 2021: 55 ML/MIN/1.73M*2
EOSINOPHIL # BLD AUTO: 0.14 X10*3/UL (ref 0–0.7)
EOSINOPHIL NFR BLD AUTO: 4.5 %
ERYTHROCYTE [DISTWIDTH] IN BLOOD BY AUTOMATED COUNT: 12.1 % (ref 11.5–14.5)
GLUCOSE SERPL-MCNC: 96 MG/DL (ref 74–99)
HCT VFR BLD AUTO: 37.4 % (ref 36–46)
HGB BLD-MCNC: 13 G/DL (ref 12–16)
IMM GRANULOCYTES # BLD AUTO: 0.01 X10*3/UL (ref 0–0.7)
IMM GRANULOCYTES NFR BLD AUTO: 0.3 % (ref 0–0.9)
LYMPHOCYTES # BLD AUTO: 0.9 X10*3/UL (ref 1.2–4.8)
LYMPHOCYTES NFR BLD AUTO: 28.8 %
MCH RBC QN AUTO: 34.4 PG (ref 26–34)
MCHC RBC AUTO-ENTMCNC: 34.8 G/DL (ref 32–36)
MCV RBC AUTO: 99 FL (ref 80–100)
MONOCYTES # BLD AUTO: 0.3 X10*3/UL (ref 0.1–1)
MONOCYTES NFR BLD AUTO: 9.6 %
NEUTROPHILS # BLD AUTO: 1.72 X10*3/UL (ref 1.2–7.7)
NEUTROPHILS NFR BLD AUTO: 54.9 %
NRBC BLD-RTO: 0 /100 WBCS (ref 0–0)
PLATELET # BLD AUTO: 212 X10*3/UL (ref 150–450)
POTASSIUM SERPL-SCNC: 4.3 MMOL/L (ref 3.5–5.3)
PROT SERPL-MCNC: 6.7 G/DL (ref 6.4–8.2)
RBC # BLD AUTO: 3.78 X10*6/UL (ref 4–5.2)
SODIUM SERPL-SCNC: 137 MMOL/L (ref 136–145)
WBC # BLD AUTO: 3.1 X10*3/UL (ref 4.4–11.3)

## 2024-09-04 PROCEDURE — 85025 COMPLETE CBC W/AUTO DIFF WBC: CPT

## 2024-09-04 PROCEDURE — 80053 COMPREHEN METABOLIC PANEL: CPT

## 2024-09-06 ENCOUNTER — ONCOLOGY MEDICATION OUTREACH (OUTPATIENT)
Dept: HEMATOLOGY/ONCOLOGY | Facility: CLINIC | Age: 69
End: 2024-09-06

## 2024-09-06 ENCOUNTER — OFFICE VISIT (OUTPATIENT)
Dept: HEMATOLOGY/ONCOLOGY | Facility: CLINIC | Age: 69
End: 2024-09-06
Payer: COMMERCIAL

## 2024-09-06 VITALS
SYSTOLIC BLOOD PRESSURE: 126 MMHG | WEIGHT: 199.3 LBS | DIASTOLIC BLOOD PRESSURE: 81 MMHG | BODY MASS INDEX: 33.16 KG/M2 | HEART RATE: 64 BPM | TEMPERATURE: 94 F

## 2024-09-06 DIAGNOSIS — Z17.0 MALIGNANT NEOPLASM OF UPPER-INNER QUADRANT OF RIGHT BREAST IN FEMALE, ESTROGEN RECEPTOR POSITIVE (MULTI): ICD-10-CM

## 2024-09-06 DIAGNOSIS — C50.211 MALIGNANT NEOPLASM OF UPPER-INNER QUADRANT OF RIGHT BREAST IN FEMALE, ESTROGEN RECEPTOR POSITIVE (MULTI): ICD-10-CM

## 2024-09-06 PROCEDURE — 99215 OFFICE O/P EST HI 40 MIN: CPT | Performed by: INTERNAL MEDICINE

## 2024-09-06 PROCEDURE — 1159F MED LIST DOCD IN RCRD: CPT | Performed by: INTERNAL MEDICINE

## 2024-09-06 PROCEDURE — 3074F SYST BP LT 130 MM HG: CPT | Performed by: INTERNAL MEDICINE

## 2024-09-06 PROCEDURE — 1036F TOBACCO NON-USER: CPT | Performed by: INTERNAL MEDICINE

## 2024-09-06 PROCEDURE — 3079F DIAST BP 80-89 MM HG: CPT | Performed by: INTERNAL MEDICINE

## 2024-09-06 PROCEDURE — 1126F AMNT PAIN NOTED NONE PRSNT: CPT | Performed by: INTERNAL MEDICINE

## 2024-09-06 ASSESSMENT — PATIENT HEALTH QUESTIONNAIRE - PHQ9
1. LITTLE INTEREST OR PLEASURE IN DOING THINGS: NOT AT ALL
2. FEELING DOWN, DEPRESSED OR HOPELESS: NOT AT ALL
SUM OF ALL RESPONSES TO PHQ9 QUESTIONS 1 & 2: 0

## 2024-09-06 ASSESSMENT — PAIN SCALES - GENERAL: PAINLEVEL: 0-NO PAIN

## 2024-09-06 NOTE — PATIENT INSTRUCTIONS
Schedule Follow up with Dr. Olson on 11/29/24. Please obtain labs 1-3 days before follow up.  Schedule Port flush week of 10/1-10/4.  Please call [phone] (option 5, then option 2) with symptoms, questions or concerns.

## 2024-09-09 ENCOUNTER — HOSPITAL ENCOUNTER (OUTPATIENT)
Dept: RADIOLOGY | Facility: EXTERNAL LOCATION | Age: 69
Discharge: HOME | End: 2024-09-09

## 2024-09-09 ENCOUNTER — APPOINTMENT (OUTPATIENT)
Dept: ORTHOPEDIC SURGERY | Facility: CLINIC | Age: 69
End: 2024-09-09
Payer: COMMERCIAL

## 2024-09-09 DIAGNOSIS — M17.11 PRIMARY OSTEOARTHRITIS OF RIGHT KNEE: Primary | ICD-10-CM

## 2024-09-09 PROCEDURE — 20611 DRAIN/INJ JOINT/BURSA W/US: CPT | Performed by: STUDENT IN AN ORGANIZED HEALTH CARE EDUCATION/TRAINING PROGRAM

## 2024-09-09 RX ORDER — LIDOCAINE HYDROCHLORIDE 10 MG/ML
2 INJECTION, SOLUTION EPIDURAL; INFILTRATION; INTRACAUDAL; PERINEURAL
Status: COMPLETED | OUTPATIENT
Start: 2024-09-09 | End: 2024-09-09

## 2024-09-09 NOTE — PROGRESS NOTES
Large Jt Asp/Inj: R knee on 9/9/2024 9:16 AM  Details: ultrasound-guided  Medications: 60 mg sodium hyaluronate 60 mg/3 mL; 2 mL lidocaine PF 10 mg/mL (1 %)    Pre-Procedure Diagnosis: right knee pain, right knee OA  Post-Procedure Diagnosis: right knee pain, right knee OA    Procedure: US-guided right knee viscosupplementation injection (Durolane)    History of Present Illness: Radha Shabazz is a pleasant 69 y.o. female with right knee pain secondary to osteoarthritis who is here for the above procedure for improved pain control.    Medications and allergies were reviewed with the patient. No contraindications were identified.    Informed Consent:   Following denial of allergy and review of potential side effects and complications including but not necessarily limited to infection, allergic reaction, local tissue breakdown, injury to soft tissue and/or nerves and seizure, the patient indicated understanding and agreed to proceed. Written consent to treatment was obtained and the patient verbalized consent for the procedure.    Procedural Details  The use of direct ultrasound visualization of the needle (rather than a non-guided injection) was required to increase patient safety by excluding inadvertent intramuscular or intratendinous placement and minimizing bleeding by avoiding osteochondral or vascular injury from the needle.  Additionally, the increased accuracy of placement may increase clinical effectiveness and will allow higher diagnostic specificity when evaluating effectiveness of this injection. All ultrasound images were annotated and saved on the performing ultrasound machine and uploaded into PACS.    Using ultrasound, a pre-scan of the region was performed to identify the target structure.     The area was prepped with chlorhexidine, then re-examined using the same transducer, a sterile ultrasound transducer cover, and sterile ultrasound gel.    Procedural pause conducted to verify: Correct patient  identity, procedure to be performed, and as applicable, correct side and site, correct patient position, and availability of implants, special equipment, or special requirements.    Procedure:   Transducer: Linear array transducer.  Patient position: Supine with the knee bent to 30 degrees.   Localization process: The suprapatellar recess was localized in a short axis view.   Local anesthesia: Local anesthesia was obtained with 2 cc of 1% lidocaine.   Needle: A 27-gauge, 1.5-inch needle was used for local anesthesia and a 22-gauge, 1.5-inch needle was used for the injectate.   Approach: A lateral to medial, in plane, approach was used to guide the needle tip into the right suprapatellar recess deep to the quadriceps tendon and superficial to the prefemoral fat pad.   Injection/Aspiration: 1 vial of Durolane (3mL, 20mg/mL) was injected into the right knee joint without complication.     Post-procedural care: The patient tolerated the procedure well. The patient was asked to ice for improved pain control and avoid submerging the area in water for the next 48 hours to help reduce the risk of infection. The patient was instructed to call the office immediately if there are any questions or concerns.    PATIENT EDUCATION:  Education of the diagnosis and treatment plan was discussed at today's appointment. A learning needs assessment was performed. The patient demonstrated understanding.        Dr. Freire performed the procedure under my direct supervision. I was present for the entire procedure.      Venita Yañez MD    [facility] [place]   and [facility]

## 2024-09-27 ENCOUNTER — APPOINTMENT (OUTPATIENT)
Dept: RADIATION ONCOLOGY | Facility: CLINIC | Age: 69
End: 2024-09-27
Payer: COMMERCIAL

## 2024-10-02 ENCOUNTER — INFUSION (OUTPATIENT)
Dept: HEMATOLOGY/ONCOLOGY | Facility: CLINIC | Age: 69
End: 2024-10-02
Payer: COMMERCIAL

## 2024-10-02 VITALS
RESPIRATION RATE: 18 BRPM | HEART RATE: 72 BPM | SYSTOLIC BLOOD PRESSURE: 116 MMHG | OXYGEN SATURATION: 97 % | DIASTOLIC BLOOD PRESSURE: 79 MMHG | TEMPERATURE: 96.3 F

## 2024-10-02 DIAGNOSIS — C50.211 MALIGNANT NEOPLASM OF UPPER-INNER QUADRANT OF RIGHT BREAST IN FEMALE, ESTROGEN RECEPTOR POSITIVE: ICD-10-CM

## 2024-10-02 DIAGNOSIS — Z17.0 MALIGNANT NEOPLASM OF UPPER-INNER QUADRANT OF RIGHT BREAST IN FEMALE, ESTROGEN RECEPTOR POSITIVE: ICD-10-CM

## 2024-10-02 LAB
ALBUMIN SERPL BCP-MCNC: 4.1 G/DL (ref 3.4–5)
ALP SERPL-CCNC: 56 U/L (ref 33–136)
ALT SERPL W P-5'-P-CCNC: 21 U/L (ref 7–45)
ANION GAP SERPL CALC-SCNC: 11 MMOL/L (ref 10–20)
AST SERPL W P-5'-P-CCNC: 28 U/L (ref 9–39)
BASOPHILS # BLD AUTO: 0.03 X10*3/UL (ref 0–0.1)
BASOPHILS NFR BLD AUTO: 1.1 %
BILIRUB SERPL-MCNC: 0.6 MG/DL (ref 0–1.2)
BUN SERPL-MCNC: 27 MG/DL (ref 6–23)
CALCIUM SERPL-MCNC: 10.2 MG/DL (ref 8.6–10.6)
CHLORIDE SERPL-SCNC: 107 MMOL/L (ref 98–107)
CO2 SERPL-SCNC: 23 MMOL/L (ref 21–32)
CREAT SERPL-MCNC: 1.54 MG/DL (ref 0.5–1.05)
EGFRCR SERPLBLD CKD-EPI 2021: 37 ML/MIN/1.73M*2
EOSINOPHIL # BLD AUTO: 0.14 X10*3/UL (ref 0–0.7)
EOSINOPHIL NFR BLD AUTO: 5.1 %
ERYTHROCYTE [DISTWIDTH] IN BLOOD BY AUTOMATED COUNT: 11.9 % (ref 11.5–14.5)
GLUCOSE SERPL-MCNC: 90 MG/DL (ref 74–99)
HCT VFR BLD AUTO: 34.6 % (ref 36–46)
HGB BLD-MCNC: 12.2 G/DL (ref 12–16)
IMM GRANULOCYTES # BLD AUTO: 0 X10*3/UL (ref 0–0.7)
IMM GRANULOCYTES NFR BLD AUTO: 0 % (ref 0–0.9)
LYMPHOCYTES # BLD AUTO: 0.7 X10*3/UL (ref 1.2–4.8)
LYMPHOCYTES NFR BLD AUTO: 25.5 %
MCH RBC QN AUTO: 34.5 PG (ref 26–34)
MCHC RBC AUTO-ENTMCNC: 35.3 G/DL (ref 32–36)
MCV RBC AUTO: 98 FL (ref 80–100)
MONOCYTES # BLD AUTO: 0.4 X10*3/UL (ref 0.1–1)
MONOCYTES NFR BLD AUTO: 14.6 %
NEUTROPHILS # BLD AUTO: 1.47 X10*3/UL (ref 1.2–7.7)
NEUTROPHILS NFR BLD AUTO: 53.7 %
NRBC BLD-RTO: ABNORMAL /100{WBCS}
PLATELET # BLD AUTO: 168 X10*3/UL (ref 150–450)
POTASSIUM SERPL-SCNC: 4.1 MMOL/L (ref 3.5–5.3)
PROT SERPL-MCNC: 6.8 G/DL (ref 6.4–8.2)
RBC # BLD AUTO: 3.54 X10*6/UL (ref 4–5.2)
SODIUM SERPL-SCNC: 137 MMOL/L (ref 136–145)
WBC # BLD AUTO: 2.7 X10*3/UL (ref 4.4–11.3)

## 2024-10-02 PROCEDURE — 84075 ASSAY ALKALINE PHOSPHATASE: CPT

## 2024-10-02 PROCEDURE — 2500000004 HC RX 250 GENERAL PHARMACY W/ HCPCS (ALT 636 FOR OP/ED): Performed by: NURSE PRACTITIONER

## 2024-10-02 PROCEDURE — 85025 COMPLETE CBC W/AUTO DIFF WBC: CPT

## 2024-10-02 PROCEDURE — 36591 DRAW BLOOD OFF VENOUS DEVICE: CPT

## 2024-10-02 RX ORDER — HEPARIN SODIUM,PORCINE/PF 10 UNIT/ML
50 SYRINGE (ML) INTRAVENOUS AS NEEDED
OUTPATIENT
Start: 2025-01-01

## 2024-10-02 RX ORDER — HEPARIN 100 UNIT/ML
500 SYRINGE INTRAVENOUS AS NEEDED
Status: DISCONTINUED | OUTPATIENT
Start: 2024-10-02 | End: 2024-10-02 | Stop reason: HOSPADM

## 2024-10-02 RX ORDER — HEPARIN 100 UNIT/ML
500 SYRINGE INTRAVENOUS AS NEEDED
OUTPATIENT
Start: 2025-01-01

## 2024-10-02 ASSESSMENT — PAIN SCALES - GENERAL: PAINLEVEL: 0-NO PAIN

## 2024-10-04 ENCOUNTER — TELEPHONE VISIT (OUTPATIENT)
Dept: HEMATOLOGY/ONCOLOGY | Facility: CLINIC | Age: 69
End: 2024-10-04
Payer: COMMERCIAL

## 2024-10-04 DIAGNOSIS — Z85.3 HISTORY OF RIGHT BREAST CANCER: Primary | ICD-10-CM

## 2024-10-04 NOTE — PROGRESS NOTES
Left message on Radha's personal voicemail that on review of her labs, her kidney functions are trending high. After discussion with Dr. Olson, we would like to switch her to Prolia in place of Zoledronic acid. It does not affect the kidneys. It is given every 6 months though as an injection. Though the study used Zoledronic acid, its believed that it may also reduce risk of spread of breast cancer to the bone. I asked her to call me on Tuesday when I return to the office to discuss further. Anurag

## 2024-10-09 ENCOUNTER — DOCUMENTATION (OUTPATIENT)
Dept: HEMATOLOGY/ONCOLOGY | Facility: HOSPITAL | Age: 69
End: 2024-10-09
Payer: COMMERCIAL

## 2024-10-09 DIAGNOSIS — Z17.0 MALIGNANT NEOPLASM OF UPPER-INNER QUADRANT OF RIGHT BREAST IN FEMALE, ESTROGEN RECEPTOR POSITIVE: Primary | ICD-10-CM

## 2024-10-09 DIAGNOSIS — C50.211 MALIGNANT NEOPLASM OF UPPER-INNER QUADRANT OF RIGHT BREAST IN FEMALE, ESTROGEN RECEPTOR POSITIVE: Primary | ICD-10-CM

## 2024-10-09 RX ORDER — ALBUTEROL SULFATE 0.83 MG/ML
3 SOLUTION RESPIRATORY (INHALATION) AS NEEDED
OUTPATIENT
Start: 2025-02-10

## 2024-10-09 RX ORDER — FAMOTIDINE 10 MG/ML
20 INJECTION INTRAVENOUS ONCE AS NEEDED
OUTPATIENT
Start: 2025-02-10

## 2024-10-09 RX ORDER — EPINEPHRINE 0.3 MG/.3ML
0.3 INJECTION SUBCUTANEOUS EVERY 5 MIN PRN
OUTPATIENT
Start: 2025-02-10

## 2024-10-09 RX ORDER — DIPHENHYDRAMINE HYDROCHLORIDE 50 MG/ML
50 INJECTION INTRAMUSCULAR; INTRAVENOUS AS NEEDED
OUTPATIENT
Start: 2025-02-10

## 2024-10-09 NOTE — PROGRESS NOTES
Her creatinine is getting worse. Will switch from Zometa to prolia.   Nat Elizabeth NP, contacted the patient to notify the change.     Her WBC has been low since 12/11/2023 (the oldest data points visible in EPIC). I think this is due to abema (started in July of 2023).     She is to complete abema in July next year. Will continue to watch closely. The count is expected to recover after completing the abema therapy. Her ANC is good. Low WBC is due to lymphopenia.     Discussed this case with with Nat Elizabeth NP.         10/09/24 at 5:22 PM - Anna Olson MD

## 2024-10-17 ENCOUNTER — OFFICE VISIT (OUTPATIENT)
Dept: CARDIOLOGY | Facility: CLINIC | Age: 69
End: 2024-10-17
Payer: COMMERCIAL

## 2024-10-17 ENCOUNTER — APPOINTMENT (OUTPATIENT)
Dept: LAB | Facility: LAB | Age: 69
End: 2024-10-17
Payer: COMMERCIAL

## 2024-10-17 ENCOUNTER — HOSPITAL ENCOUNTER (OUTPATIENT)
Dept: CARDIOLOGY | Facility: CLINIC | Age: 69
Discharge: HOME | End: 2024-10-17
Payer: COMMERCIAL

## 2024-10-17 VITALS
HEART RATE: 61 BPM | DIASTOLIC BLOOD PRESSURE: 85 MMHG | TEMPERATURE: 98.6 F | RESPIRATION RATE: 18 BRPM | SYSTOLIC BLOOD PRESSURE: 130 MMHG | OXYGEN SATURATION: 98 % | BODY MASS INDEX: 32.95 KG/M2 | WEIGHT: 198 LBS

## 2024-10-17 DIAGNOSIS — Z51.81 ENCOUNTER FOR THERAPEUTIC DRUG MONITORING: ICD-10-CM

## 2024-10-17 DIAGNOSIS — Z79.899 ENCOUNTER FOR MONITORING CARDIOTOXIC DRUG THERAPY: ICD-10-CM

## 2024-10-17 DIAGNOSIS — I51.89 DIASTOLIC DYSFUNCTION WITHOUT HEART FAILURE: Primary | ICD-10-CM

## 2024-10-17 DIAGNOSIS — R73.9 HYPERGLYCEMIA: ICD-10-CM

## 2024-10-17 DIAGNOSIS — Z51.81 ENCOUNTER FOR MONITORING CARDIOTOXIC DRUG THERAPY: ICD-10-CM

## 2024-10-17 DIAGNOSIS — E55.9 VITAMIN D DEFICIENCY: ICD-10-CM

## 2024-10-17 DIAGNOSIS — E78.5 DYSLIPIDEMIA: ICD-10-CM

## 2024-10-17 DIAGNOSIS — R06.9 UNSPECIFIED ABNORMALITIES OF BREATHING: ICD-10-CM

## 2024-10-17 LAB
EJECTION FRACTION APICAL 4 CHAMBER: 66.2
EJECTION FRACTION: 62 %
GLOBAL LONGITUDINAL STRAIN: 16.4 %
LEFT ATRIUM VOLUME AREA LENGTH INDEX BSA: 11.6 ML/M2
LEFT VENTRICLE INTERNAL DIMENSION DIASTOLE: 3.19 CM (ref 3.5–6)
LEFT VENTRICULAR OUTFLOW TRACT DIAMETER: 1.88 CM
MITRAL VALVE E/A RATIO: 0.65
RIGHT VENTRICLE FREE WALL PEAK S': 9 CM/S
RIGHT VENTRICLE PEAK SYSTOLIC PRESSURE: 26.5 MMHG
TRICUSPID ANNULAR PLANE SYSTOLIC EXCURSION: 2.5 CM

## 2024-10-17 PROCEDURE — 1159F MED LIST DOCD IN RCRD: CPT | Performed by: NURSE PRACTITIONER

## 2024-10-17 PROCEDURE — 3075F SYST BP GE 130 - 139MM HG: CPT | Performed by: NURSE PRACTITIONER

## 2024-10-17 PROCEDURE — 93321 DOPPLER ECHO F-UP/LMTD STD: CPT | Performed by: INTERNAL MEDICINE

## 2024-10-17 PROCEDURE — 99213 OFFICE O/P EST LOW 20 MIN: CPT | Performed by: NURSE PRACTITIONER

## 2024-10-17 PROCEDURE — 76376 3D RENDER W/INTRP POSTPROCES: CPT

## 2024-10-17 PROCEDURE — 76376 3D RENDER W/INTRP POSTPROCES: CPT | Performed by: INTERNAL MEDICINE

## 2024-10-17 PROCEDURE — 93325 DOPPLER ECHO COLOR FLOW MAPG: CPT | Performed by: INTERNAL MEDICINE

## 2024-10-17 PROCEDURE — 1126F AMNT PAIN NOTED NONE PRSNT: CPT | Performed by: NURSE PRACTITIONER

## 2024-10-17 PROCEDURE — 93308 TTE F-UP OR LMTD: CPT | Performed by: INTERNAL MEDICINE

## 2024-10-17 PROCEDURE — 2500000004 HC RX 250 GENERAL PHARMACY W/ HCPCS (ALT 636 FOR OP/ED): Performed by: INTERNAL MEDICINE

## 2024-10-17 PROCEDURE — 93356 MYOCRD STRAIN IMG SPCKL TRCK: CPT | Performed by: INTERNAL MEDICINE

## 2024-10-17 PROCEDURE — G2211 COMPLEX E/M VISIT ADD ON: HCPCS | Performed by: NURSE PRACTITIONER

## 2024-10-17 PROCEDURE — 3079F DIAST BP 80-89 MM HG: CPT | Performed by: NURSE PRACTITIONER

## 2024-10-17 ASSESSMENT — PAIN SCALES - GENERAL: PAINLEVEL_OUTOF10: 0-NO PAIN

## 2024-10-17 NOTE — PROGRESS NOTES
Chief Complaint:  Follow-up cardiovascular evaluation and optimization and ongoing monitoring for cardiotoxic medication     HPI:  Presents today for follow up.  Feels good some days, followed by extreme fatigue.  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  On her feet a lot.  She continues to C/O exertional dyspnea, especially when carrying something upstairs.  No overt chest pain, orthopnea, pnd, edema.  Has occasional orthostatic dizziness.  Denies fever, chills, night sweats.     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    Visit Vitals  /85   Pulse 61   Temp 37 °C (98.6 °F)   Resp 18   Wt 89.8 kg (198 lb)   SpO2 98%   BMI 32.95 kg/m²   OB Status Postmenopausal   Smoking Status Never   BSA 2.03 m²     Current Outpatient Medications   Medication Instructions    anastrozole (ARIMIDEX) 1 mg, oral, Daily, Swallow whole with a drink of water.    anastrozole (ARIMIDEX) 1 mg, oral, Daily, Swallow whole with a drink of water.    escitalopram (Lexapro) 5 mg tablet 1 tablet, Daily    nitrofurantoin, macrocrystal-monohydrate, (Macrobid) 100 mg capsule 100 mg, oral, Every 12 hours scheduled    omeprazole (PRILOSEC) 20 mg, Daily    sodium hyaluronate (Durolane) 60 mg/3 mL injection Inject one syringe(60mg/3mL) into right knee one time at doctors office    solifenacin (VESICARE) 10 mg, oral, Daily    topiramate (Topamax) 25 mg tablet 1 tablet, Daily PRN    UNABLE TO FIND Breast prosthesis for left and/or right breast    UNABLE TO FIND Mastectomy Bra for Right Mastectomy         PE:  alert oriented, nAD  Lungs ctab  Heart RRR S1S2 no m/r/g neck veins flat  Abd benign  Trace bilat edema     Onco-Cardiology Measurements:  Historical Measurements from Previous  Study  2D EF (Biplane)                  64.0%%  3D EF                            55.0%%  Global Longitudinal Strain (GLS) -19.6%     Current Measurements  2D EF (Biplane)                   62.0%%  3D EF                             62.0%%  Global Longitudinal Strain (GLS) -16.4%%     GLS Tracking Quality: Fair        CONCLUSIONS:   1. Poorly visualized anatomical structures due to suboptimal image quality.   2. The left ventricular systolic function is normal, with a 3D calculated ejection fraction of 62%.   3. Spectral Doppler shows an impaired relaxation pattern of left ventricular diastolic filling.   4. Left ventricular cavity size is decreased.   5. There is normal right ventricular global systolic function.   6. Right ventricular systolic pressure is within normal limits.   7. Mild aortic valve regurgitation.   8. Strain values are abnormal, which imply subclinical myocardial dysfunction.     A/P:  70 yo CF on long term zometa, having routine echoes.  Continues to c/o exertional dyspnea without associated symptoms.  Normal BNP.  We discussed intentional exercise and it's impact on diastolic dysfunction.  She had been riding her bike 40min per day, but hasn't been consistent.    - metabolic labs  - we discussed adding carvedilol for her diastolic dysfunction, she would prefer to try to make lifestyle changes  - Vit D +K2  - continue asa for atrial septal aneurysm  - continue low sugar diet, eliminate processed foods  - follow up onco echo and visit in 6 months

## 2024-10-18 ENCOUNTER — LAB (OUTPATIENT)
Dept: LAB | Facility: LAB | Age: 69
End: 2024-10-18
Payer: COMMERCIAL

## 2024-10-18 ENCOUNTER — APPOINTMENT (OUTPATIENT)
Dept: CARDIOLOGY | Facility: CLINIC | Age: 69
End: 2024-10-18
Payer: COMMERCIAL

## 2024-10-18 DIAGNOSIS — E78.5 DYSLIPIDEMIA: ICD-10-CM

## 2024-10-18 DIAGNOSIS — R73.9 HYPERGLYCEMIA: ICD-10-CM

## 2024-10-18 DIAGNOSIS — E55.9 VITAMIN D DEFICIENCY: ICD-10-CM

## 2024-10-18 LAB
25(OH)D3 SERPL-MCNC: 39 NG/ML (ref 30–100)
EST. AVERAGE GLUCOSE BLD GHB EST-MCNC: 88 MG/DL
HBA1C MFR BLD: 4.7 %

## 2024-10-18 PROCEDURE — 80061 LIPID PANEL: CPT

## 2024-10-18 PROCEDURE — 36415 COLL VENOUS BLD VENIPUNCTURE: CPT

## 2024-10-18 PROCEDURE — 82306 VITAMIN D 25 HYDROXY: CPT

## 2024-10-18 PROCEDURE — 83036 HEMOGLOBIN GLYCOSYLATED A1C: CPT

## 2024-10-18 PROCEDURE — 83695 ASSAY OF LIPOPROTEIN(A): CPT

## 2024-10-19 LAB
CHOLEST SERPL-MCNC: 259 MG/DL (ref 0–199)
CHOLESTEROL/HDL RATIO: 4.4
HDLC SERPL-MCNC: 58.8 MG/DL
LDLC SERPL CALC-MCNC: 160 MG/DL
NON HDL CHOLESTEROL: 200 MG/DL (ref 0–149)
TRIGL SERPL-MCNC: 199 MG/DL (ref 0–149)
VLDL: 40 MG/DL (ref 0–40)

## 2024-10-21 LAB — APO B100 SERPL-MCNC: 124 MG/DL (ref 60–117)

## 2024-11-06 DIAGNOSIS — Z17.0 MALIGNANT NEOPLASM OF UPPER-INNER QUADRANT OF RIGHT BREAST IN FEMALE, ESTROGEN RECEPTOR POSITIVE: ICD-10-CM

## 2024-11-06 DIAGNOSIS — C50.211 MALIGNANT NEOPLASM OF UPPER-INNER QUADRANT OF RIGHT BREAST IN FEMALE, ESTROGEN RECEPTOR POSITIVE: ICD-10-CM

## 2024-11-08 ENCOUNTER — OFFICE VISIT (OUTPATIENT)
Dept: ORTHOPEDIC SURGERY | Facility: CLINIC | Age: 69
End: 2024-11-08
Payer: COMMERCIAL

## 2024-11-08 ENCOUNTER — HOSPITAL ENCOUNTER (OUTPATIENT)
Dept: RADIOLOGY | Facility: CLINIC | Age: 69
Discharge: HOME | End: 2024-11-08
Payer: COMMERCIAL

## 2024-11-08 DIAGNOSIS — M70.62 GREATER TROCHANTERIC BURSITIS OF LEFT HIP: Primary | ICD-10-CM

## 2024-11-08 DIAGNOSIS — M25.552 LEFT HIP PAIN: ICD-10-CM

## 2024-11-08 DIAGNOSIS — M76.32 IT BAND SYNDROME, LEFT: ICD-10-CM

## 2024-11-08 PROCEDURE — 1159F MED LIST DOCD IN RCRD: CPT

## 2024-11-08 PROCEDURE — 20610 DRAIN/INJ JOINT/BURSA W/O US: CPT

## 2024-11-08 PROCEDURE — 73502 X-RAY EXAM HIP UNI 2-3 VIEWS: CPT | Mod: LT

## 2024-11-08 PROCEDURE — 1036F TOBACCO NON-USER: CPT

## 2024-11-08 PROCEDURE — 99214 OFFICE O/P EST MOD 30 MIN: CPT

## 2024-11-08 RX ORDER — LIDOCAINE HYDROCHLORIDE 10 MG/ML
3 INJECTION, SOLUTION INFILTRATION; PERINEURAL
Status: COMPLETED | OUTPATIENT
Start: 2024-11-08 | End: 2024-11-08

## 2024-11-08 NOTE — PROGRESS NOTES
Radha Shabazz  is a 69 y.o. year-old  female. she  is a new patient to our office and presents with a chief complaint of Left  hip pain.  Pain started 1.5-2 months ago. Patient with hx of breast and tongue cancer. Hx of osteoporosis receiving biologic injections. No injury to the hip. She has had similar pain in the past, diagnosed with GT bursitis and was provided a GT bursal injection over 2 years ago that significantly helped with her pain. The pain's location is lateral hip and is a [sharp/pinchy and achy at rest] type pain.  It is worse with stairs and activities especially getting up from a chair, going down stairs. It hurts to lie on the affected side.  The symptoms have been treated with rest and activity modification. NSAIDS including [IBU] have also been used with minimal improvement as well as Voltaren gel. They [have not] attempted physical therapy. They [have not] had a previous hip surgery. Patient works as an RN in pain management. She is hoping for another CSI bursal injection. Notes that she has spoken with her oncologist and medical team about this injection and they are fine with her receiving it.      Past Medical, Family, and Social History reviewed and inlcude:[none] all other history pertinent to the presenting problem  Review of Systems  A complete review of systems was conducted, pertinent only to the HPI noted above.  Constitutional: None  Eyes: No additions to above history  Ears, Nose, Throat: No additions to above history  Cardiovascular: No additions to above history  Respiratory: No additions to above history  GI: No additions to above history  : No additions to above history  Skin/Neuro: No additions to above history  Endocrine/Heme/Lymph: No additions to above history  Immunologic: No additions to above history  Psychiatric: No additions to above history  Musculoskeletal: see above  Eyes: sclera anicteric  ENT: hearing appropriate for normal conversation, neck appears symmetric  with no gross thyromegaly  Pulm: No labored breathing, no wheezing  CVS: Regular rate and rhythm  Abd: Non-distended  Skin: No rashes, erythema, or induration around hip    MUSCULOSKELETAL EXAM: HIP      Left HIP:   IR@90: [40] degrees   ER@90: [70] degrees   No pain in groin with FADIR , Pain with palpation over GT- reproduces pain, -stinchfield, - subspine,       Neurovascularly Intact to Femroal/obturator/LFCN/S/S/SPN/DPN/T nerves on bilateral extremities    Xrays independently interpreted and  reviewed: femoral head well seated within acetabulum. Very mild FA DJD. No fracture or dislocation.     1. Greater trochanteric bursitis of left hip        2. Left hip pain  XR hip left with pelvis when performed 2 or 3 views      3. It band syndrome, left             L Inj/Asp: L greater trochanteric bursa on 11/8/2024 9:46 AM  Details: 22 G needle, lateral approach  Medications: 3 mL lidocaine 10 mg/mL (1 %); 40 mg triamcinolone acetonide 10 mg/mL  Outcome: tolerated well, no immediate complications  Procedure, treatment alternatives, risks and benefits explained, specific risks discussed. Patient was prepped and draped in the usual sterile fashion.          PLAN:  Diagnosis discussed with patient. After a thorough discussion with the patient including expectations, I would recommend we continue a conservative program for now.  We discussed activity modification, over the counter medications including NSAIDs, and PT. I also discussed the option of GT bursal injection which she elected to proceed with and this was provided today at her request. She wanted to wait on PT for now. Discussed with her no submerging of the hip in water over the next few days and no use of creams, lotions, or ointments. Patient made aware of follow up precautions. All questions answered, patient in agreement with plan.    Repeat injection no sooner than 3 months if indicated.

## 2024-11-15 ENCOUNTER — HOSPITAL ENCOUNTER (OUTPATIENT)
Dept: RADIATION ONCOLOGY | Facility: CLINIC | Age: 69
Setting detail: RADIATION/ONCOLOGY SERIES
Discharge: HOME | End: 2024-11-15
Payer: COMMERCIAL

## 2024-11-15 ENCOUNTER — SOCIAL WORK (OUTPATIENT)
Dept: CASE MANAGEMENT | Facility: HOSPITAL | Age: 69
End: 2024-11-15

## 2024-11-15 VITALS
TEMPERATURE: 97.3 F | BODY MASS INDEX: 32.69 KG/M2 | WEIGHT: 196.43 LBS | OXYGEN SATURATION: 96 % | SYSTOLIC BLOOD PRESSURE: 131 MMHG | HEART RATE: 68 BPM | RESPIRATION RATE: 18 BRPM | DIASTOLIC BLOOD PRESSURE: 81 MMHG

## 2024-11-15 DIAGNOSIS — Z17.0 MALIGNANT NEOPLASM OF CENTRAL PORTION OF RIGHT BREAST IN FEMALE, ESTROGEN RECEPTOR POSITIVE: ICD-10-CM

## 2024-11-15 DIAGNOSIS — Z13.71 BRCA NEGATIVE: Primary | ICD-10-CM

## 2024-11-15 DIAGNOSIS — C50.111 MALIGNANT NEOPLASM OF CENTRAL PORTION OF RIGHT BREAST IN FEMALE, ESTROGEN RECEPTOR POSITIVE: ICD-10-CM

## 2024-11-15 PROCEDURE — 99213 OFFICE O/P EST LOW 20 MIN: CPT | Performed by: NURSE PRACTITIONER

## 2024-11-15 ASSESSMENT — PAIN SCALES - GENERAL: PAINLEVEL_OUTOF10: 0-NO PAIN

## 2024-11-15 NOTE — ADDENDUM NOTE
Encounter addended by: JUAN Bright-CECILIA on: 11/15/2024 12:48 PM   Actions taken: Clinical Note Signed

## 2024-11-15 NOTE — PROGRESS NOTES
Radiation Oncology Follow-Up    Patient Name:  Radha Shabazz  MRN:  [id number]  :  1955    Referring Provider: Windy Borja, APR*  Primary Care Provider: Vineet Lemus MD  Care Team: Patient Care Team:  Vineet Lemus MD as PCP - General  Vineet Lemus MD as PCP - [facility] [employer] PCP  Loretta Robin MD as Consulting Physician (Hematology and Oncology)  Anna Olson MD as Medical Oncologist (Hematology and Oncology)  MARY BETH Saunders as  ()    Date of Service: 11/15/2024     Cancer Staging:          Breast         AJCC Edition: 8th (AJCC), Diagnosis Date: 21-Dec-2021, IIA, cT3 cN0 cM0 G2     Treatment Synopsis:    69-year-old postmenopausal female with prior history of head and neck cancer (SCC of the tongue) now with right-sided multifocal  invasive lobular carcinoma, clinical  stage IIA (cT3 N0 M0). The patient's breast cancer was diagnosed on 2021, and was grade 2,  estrogen receptor positive at >95%/>95%, progesterone receptor positive at >95%/>95%%, and HER-2/berta negative.   MammaPrint Index = +0.142; translating to Low Risk Luminal-Type A.      Treatment Summary:      2021: Patient underwent a screening mammogram. This showed a focal asymmetry in the central aspect of the right breast   12/15/2021: Patient underwent a right diagnostic mammogram and breast US. This revealed a mass at the 1:00, 6 cm from the nipple measuring 1.7 x 1.2 x 1.1 cm. Another mass measuring 0.5 cm was seen at 5:00, 4 cm from the nipple. Right axilla US revealed  3 unremarkable LNs. Breast tissue was extremely dense.   2021: Patient underwent US-guided core biopsies from two sites in the right breast, from 1:00, 6 cm from the nipple and from 9:00, 9 cm from the nipple. Pathology revealed above results.   MRI of the breast was attempted but patient could not tolerate it   2022: Patient was started on Letrozole 2.5 mg by mouth  daily   4/15/2022: Completed a mid treatment US. This showed a stable to slight interval decrease in size of biopsy proven carcinoma.    06/13/2022: Completed right breast US. This showed interval decrease in size of biopsy proven carcinoma.   09/16/2022: Patient underwent a right mastectomy with SNLB and immediate reconstruction. Pathology revealed a 5.2 cm mass with 3/8 macrometastatic LNs plus one LN with isolated tumor cell. Size of largest LN was 0.3cm. ([id number])   09/17/2022: Evacuation of hematoma with 750 cc of estimated blood loss   10/06/2022: Revision of right reconstructed breast   12/02/2022: Received the first dose of AC   1/13/2023: Received the 4th and last dose of AC after which chemotherapy was discontinued due to side effects and hospitalization. Patient therefore did not receive any Taxane   04/07/2023: Started Exemastane   3/15/23 - 04/18/2023: radiation therapy to the right chest wall, axilla, SCV and IMNs consisting of a dose of 50 Gy.    Adjuvant treatment with exemestane and abemaciclib  (started July 2023)    6/30/24: exemestane discontinued and switched to anastrozole.  3/6/2024: initiated on Zometa q 6 mo..      SUBJECTIVE  History of Present Illness:   Radha Shabazz is here today for routine radiation follow up/survivorship visit. She is doing well overall. Skin right chest wall well healed and skin intact. Expander fully expanded. Plan is for BETTY reconstruction next year. She is now taking anastrozole without adverse effects except some mild arthralgias.  She was unable to tolerate exemestane. She continues abemaciclib - has occasional diarrhea controlled with imodium prn. She started Zometa infusions however had significant arthralgias and my switch to Prolia.  Denies any swelling of right arm or difficulty with ROM or R UE. Denies headaches, fever, chills, cough, SOB, chest pain, GI or  complaints and no bony pain. 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     Review of Systems:     Review of Systems   All other systems reviewed and are negative.    Performance Status:   The Karnofsky performance scale today is 100, Fully active, able to carry on all pre-disease performed without restriction (ECOG equivalent 0).      OBJECTIVE    Current Outpatient Medications:     abemaciclib (Verzenio) 50 mg tablet, TAKE 1 TABLET BY MOUTH TWICE DAILY. SWALLOW WHOLE, Disp: 30 tablet, Rfl: 11    anastrozole (Arimidex) 1 mg tablet, Take 1 tablet (1 mg total) by mouth once daily.  Swallow whole with a drink of water., Disp: 30 tablet, Rfl: 5    anastrozole (Arimidex) 1 mg tablet, Take 1 tablet (1 mg total) by mouth once daily.  Swallow whole with a drink of water., Disp: 90 tablet, Rfl: 3    escitalopram (Lexapro) 5 mg tablet, Take 1 tablet (5 mg) by mouth once daily., Disp: , Rfl:     nitrofurantoin, macrocrystal-monohydrate, (Macrobid) 100 mg capsule, Take 1 capsule (100 mg) by mouth every 12 hours., Disp: 14 capsule, Rfl: 0    omeprazole (PriLOSEC) 20 mg DR capsule, Take 1 capsule (20 mg) by mouth once daily., Disp: , Rfl:     sodium hyaluronate (Durolane) 60 mg/3 mL injection, Inject one syringe(60mg/3mL) into right knee one time at doctors office, Disp: 3 mL, Rfl: 0    solifenacin (VESIcare) 10 mg tablet, Take 1 tablet (10 mg) by mouth once daily., Disp: 90 tablet, Rfl: 3    topiramate (Topamax) 25 mg tablet, Take 1 tablet (25 mg) by mouth once daily as needed., Disp: , Rfl:     UNABLE TO FIND, Breast prosthesis for left and/or right breast, Disp: , Rfl:     UNABLE TO FIND, Mastectomy Bra for Right Mastectomy, Disp: , Rfl:     Current Facility-Administered Medications:     aspirin EC tablet 81 mg, 81 mg, oral, Once, Elizabeth Trujillo, APRN-CNP     Physical Exam  Constitutional:       Appearance: Normal appearance.   HENT:      Head: Normocephalic and atraumatic.      Nose: Nose normal.      Mouth/Throat:      Mouth: Mucous membranes are moist.      Pharynx: Oropharynx is clear.   Eyes:       Conjunctiva/sclera: Conjunctivae normal.      Pupils: Pupils are equal, round, and reactive to light.   Cardiovascular:      Rate and Rhythm: Normal rate.   Pulmonary:      Effort: Pulmonary effort is normal.   Chest:   Breasts:     Right: Absent.      Left: Normal. No swelling, bleeding, inverted nipple, mass, nipple discharge or skin change.      Comments: Right tissue expander fully expanded. No palpable chest wall nodules.   Abdominal:      Palpations: Abdomen is soft.   Musculoskeletal:         General: No swelling. Normal range of motion.      Cervical back: Normal range of motion and neck supple.   Lymphadenopathy:      Upper Body:      Right upper body: No supraclavicular or axillary adenopathy.      Left upper body: No supraclavicular or axillary adenopathy.   Skin:     General: Skin is warm and dry.   Neurological:      General: No focal deficit present.      Mental Status: She is alert and oriented to person, place, and time.   Psychiatric:         Mood and Affect: Mood normal.         Behavior: Behavior normal.         RESULTS:  Narrative & Impression   Interpreted By:  Grisel Gao,   STUDY:  BI MAMMO LEFT DIAGNOSTIC TOMOSYNTHESIS;  12/13/2023 10:12 am      ACCESSION NUMBER(S):  [id number]      ORDERING CLINICIAN:  ALEA DYE      INDICATION:  Signs/Symptoms:History of Breast Cancer. Right mastectomy.      COMPARISON:  Left mammogram 11/02/2022, 12/02/2021, 08/31/2020      FINDINGS:  2D and tomosynthesis images were reviewed at 1 mm slice thickness.      Density:  The left breast tissue is heterogeneously dense, which may  obscure small masses.      There are benign round and secretory calcifications. No suspicious  masses or calcifications are identified.      IMPRESSION:  No mammographic evidence of malignancy, left breast.      BI-RADS CATEGORY:      BI-RADS Category:  2 Benign.  Recommendation:  Routine Screening Mammogram in 1 Year.  Recommended Date:  1 Year.  Laterality:  Left.      ASSESSMENT/PLAN:  69 y.o. female with stage IIA right breast cancer s/p neoadjuvant endocrine therapy followed by R mastectomy with immediate reconstruction followed by adjuvant chemotherapy and radiation to chest wall and comprehensive stephanie chain. Doing well. She will continue anastrozole and abemaciclib and follow up with Med Onc.  Radiation follow up in 12 mo.  Call with any concerns.     Carol Borja CNP  [phone]

## 2024-11-15 NOTE — PROGRESS NOTES
DB met with patient today at [facility] with ROSA Borja CNP today. Patient continues to take Verzenio. SW reviewed that she is due for reapplying for assistance. Patient plans to reach out to [facility] as she is having issues with delivery and will ask about reapplying. SW provided contact information for patient to reach out to  for assistance. Patient is looking forward to leaving on a cruise tomorrow in the [place]. SW provided support and wished her well.

## 2024-12-01 NOTE — PROGRESS NOTES
Patient Visit Information:   Patient name: Radha Shabazz  : 1955  Date of Service: 2024    Visit Type: Follow-up      Cancer History:          Breast         AJCC Edition: 8th (AJCC), Diagnosis Date:           Cancer Staging   No matching staging information was found for the patient.       Treatment Synopsis:       Radha Shabazz is a 69 y.o. 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 The patient's breast cancer was diagnosed on 2021, and was grade 2, estrogen receptor positive at >95%/>95%, progesterone receptor positive at >95%/>95%%, and HER-2/berta negative.  After neoadjuvant endocrine therapy (letrozole), final path after right mastectomy showed a 5.2 cm mass with 3/8 macrometastatic LNs plus one LN with isolated tumor cell. Size of largest LN was 0.3cm. ([id number])    MammaPrint Index = +0.142; translating to Low Risk Luminal-Type A.      CURRENT THERAPY: Adjuvant Arimidex and Abemaciclib      ONCOLOGIC HISTORY:  Details of her breast cancer history are as follows:      2021: Patient underwent a screening mammogram. This showed a focal asymmetry in the central aspect of the right breast   12/15/2021: Patient underwent a right diagnostic mammogram and breast US. This revealed a mass at the 1:00, 6 cm from the nipple measuring 1.7 x 1.2 x 1.1 cm. Another mass measuring 0.5 cm was seen at 5:00, 4 cm from the nipple. Right axilla US revealed  3 unremarkable LNs. Breast tissue was extremely dense.   2021: Patient underwent US-guided core biopsies from two sites in the right breast, from 1:00, 6 cm from the nipple and from 9:00, 9 cm from the nipple. Pathology revealed above results.   MRI of the breast was attempted but patient could not tolerate it   2022: Patient was started on Letrozole 2.5 mg by mouth daily   4/15/2022:  Completed a mid treatment US. This showed a stable to slight interval decrease in size of biopsy proven carcinoma.    06/13/2022: Completed right breast US. This showed interval decrease in size of biopsy proven carcinoma.   09/16/2022: Patient underwent a right mastectomy with SNLB and immediate reconstruction. Pathology revealed a 5.2 cm mass with 3/8 macrometastatic LNs plus one LN with isolated tumor cell. Size of largest LN was 0.3cm. ([id number])   09/17/2022: Evacuation of hematoma with 750 cc of estimated blood loss   10/06/2022: Revision of right reconstructed breast   12/02/2022: Received the first dose of AC   1/13/2023: Received the 4th and last dose of AC after which chemotherapy was discontinued due to side effects and hospitalization. Patient therefore did not receive any Taxane   04/07/2023: Started Exemestane   04/13/2023: Completed radiation   06/30/2023: Exemestane discontinued   06/30/2023: Started Anastrozole  July 2023: Started Abemaciclib  (getting 's supply - [facility])  3/6/2024: Started Zometa (every 24 weeks)  8/26/2024: Last dose of Zometa   10/9/2024: Her creatinine was noted to get worse. Switched from Zometa to Denosumab (every 24 weeks)    History of Present Illness: Patient ID:  Radha Shabazz is a 69 y.o. female.     Chief Complaint and/or reason for visit: Here for a follow-up     Interval History:    Radha Shabazz is a pleasant  69 y.o. 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 The patient's breast cancer was diagnosed on December 21, 2021, and was grade 2,  estrogen receptor positive at >95%/>95%, progesterone receptor positive at >95%/>95%%, and HER-2/berta negative.  MammaPrint  Index = +0.142; translating to Low Risk Luminal-Type A.   After neoadjuvant endocrine therapy (letrozole), final path after right mastectomy showed a  5.2 cm mass with 3/8 macrometastatic LNs plus one LN with isolated tumor cell. Size of largest LN was 0.3cm. ([id number])     Here for a follow-up.     She reports that she continues to feel occasional fatigue.  **  Chronic pain is stable - Her knees are the worst (right knee more than left, rating this as 7-8/10). She is getting her R knee replaced in the future (once she is off Abemaciclib). **      SOB upon exertion (upon going up stairs) - stable. Cardio-oncology is following her. **     She is a nurse (pain management), works 2 days a week.      She denies fever, chills, Wt loss, headaches, CP, N/V, abdominal pain, constipation, diarrhea, neuropathy, extremity swelling, rashes. Appetite is good and she is drinking fine.     Review of Systems:   A 14-point review of system was completed and was negative except for what is noted in HPI.      Allergies and Intolerances:       Allergies:   Allergies   Allergen Reactions    Iodine Hives    Iodinated Contrast Media Rash, Swelling and Unknown    Sulfa (Sulfonamide Antibiotics) Rash, Swelling, Unknown and Hives             Outpatient Medication Profile:   Current Outpatient Medications on File Prior to Visit   Medication Sig Dispense Refill    abemaciclib (Verzenio) 50 mg tablet TAKE 1 TABLET BY MOUTH TWICE DAILY. SWALLOW WHOLE 30 tablet 11    anastrozole (Arimidex) 1 mg tablet Take 1 tablet (1 mg total) by mouth once daily.  Swallow whole with a drink of water. 30 tablet 5    anastrozole (Arimidex) 1 mg tablet Take 1 tablet (1 mg total) by mouth once daily.  Swallow whole with a drink of water. 90 tablet 3    escitalopram (Lexapro) 5 mg tablet Take 1 tablet (5 mg) by mouth once daily.      nitrofurantoin, macrocrystal-monohydrate, (Macrobid) 100 mg capsule Take 1 capsule (100 mg) by mouth every 12 hours. 14 capsule 0    omeprazole (PriLOSEC) 20 mg DR capsule Take 1 capsule (20 mg) by mouth once daily.      sodium hyaluronate (Durolane) 60 mg/3 mL injection Inject one  syringe(60mg/3mL) into right knee one time at doctors office 3 mL 0    solifenacin (VESIcare) 10 mg tablet Take 1 tablet (10 mg) by mouth once daily. 90 tablet 3    topiramate (Topamax) 25 mg tablet Take 1 tablet (25 mg) by mouth once daily as needed.      UNABLE TO FIND Breast prosthesis for left and/or right breast      UNABLE TO FIND Mastectomy Bra for Right Mastectomy       Current Facility-Administered Medications on File Prior to Visit   Medication Dose Route Frequency Provider Last Rate Last Admin    aspirin EC tablet 81 mg  81 mg oral Once Elizabeth Trujillo, APRN-CNP           Medical History:    Past Medical History:   Diagnosis Date    Breast cancer (Multi)     Encounter for gynecological examination (general) (routine) without abnormal findings     Encounter for gynecological examination without abnormal finding    Encounter for screening mammogram for malignant neoplasm of breast     Visit for screening mammogram    Personal history of irradiation     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    Personal history of other diseases of the circulatory system     History of hypertension       Surgical History:   Past Surgical History:   Procedure Laterality Date    APPENDECTOMY  10/05/2015    Appendectomy    HYSTERECTOMY  10/05/2015    Supracervical Hysterectomy    MASTECTOMY Right 2022    OTHER SURGICAL HISTORY  06/27/2022    Tongue surgery    OTHER SURGICAL HISTORY  06/27/2022    Neck dissection modified radical    OTHER SURGICAL HISTORY  08/04/2020    Split thickness skin graft    TOTAL ABDOMINAL HYSTERECTOMY W/ BILATERAL SALPINGOOPHORECTOMY  10/05/2015    Salpingo-oophorectomy Bilateral       Social Substance History:   Social History     Tobacco Use    Smoking status: Never    Smokeless tobacco: Never   Substance Use Topics    Alcohol use: Not on file     Social History     Substance and Sexual Activity   Drug Use Not on file       Family History:   Family  "History   Problem Relation Name Age of Onset    Aortic stenosis Mother      Diabetes Father      Stomach cancer Father      Colon cancer Father's Brother      Cancer Other          OBJECTIVE:     Visit Vitals       Pain Scale: 0   **      The ECOG performance scale today is Asymptomatic/0   ***        Physical Exam:      Constitutional: Well developed, awake/alert/oriented  x3, no distress, alert and cooperative   Eyes: PERRL, EOMI, clear sclera   ENMT: mucous membranes moist, no apparent injury,  no lesions seen   Head/Neck: Neck supple, no apparent injury, thyroid  without mass or tenderness, No JVD, trachea midline, no bruits   Respiratory/Thorax: Patent airways, CTAB, normal  breath sounds with good chest expansion, thorax symmetric   Cardiovascular: Regular, rate and rhythm, no murmurs,  2+ equal pulses of the extremities, normal S 1and S 2   Gastrointestinal: Nondistended, soft, non-tender,  no rebound tenderness or guarding, no masses palpable, no organomegaly, +BS, no bruits   Musculoskeletal: ROM intact, no joint swelling, normal  strength   Extremities: No LE edema   Neurological: alert and oriented x3, intact senses,  motor, response and reflexes, normal strength   Breast: s/p Right mastectomy with implant reconstruction with well healed surgical incision. No palpable mass in the left breast. No palpable axillary or supraclavicular lymphadenopathies bilaterally. No swelling of either upper extremity which had free range of motion.    Lymphatic: No significant lymphadenopathy   Psychological: Appropriate mood and behavior   Skin: Warm and dry, no lesions, no rashes          LAB RESULTS    Lab Results   Component Value Date    WBC 2.7 (L) 10/02/2024    HGB 12.2 10/02/2024    HCT 34.6 (L) 10/02/2024    MCV 98 10/02/2024     10/02/2024     Lab Results   Component Value Date    NEUTROABS 1.47 10/02/2024       No results for input(s): \"CREATININE\", \"BUN\", \"NA\", \"K\", \"CL\", \"CO2\" in the last 72 " "hours.    No components found for: \"CALCGFR\"  Lab Results   Component Value Date    GLUCOSE 90 10/02/2024     Lab Results   Component Value Date     10/02/2024    K 4.1 10/02/2024     10/02/2024    CO2 23 10/02/2024    BUN 27 (H) 10/02/2024    CREATININE 1.54 (H) 10/02/2024    ALT 21 10/02/2024    AST 28 10/02/2024    ALKPHOS 56 10/02/2024    BILITOT 0.6 10/02/2024     No results found for: \"FOLATE\"  No results found for: \"YUNFDJRK41\"  Lab Results   Component Value Date    TSH 0.77 02/15/2023        Imaging:      No images are attached to the encounter.        Assessment and Plan:   John Shabazz is a 69 y.o. 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 The patient's breast cancer was diagnosed on December 21, 2021, and was grade 2, estrogen receptor positive at >95%/>95%, progesterone receptor positive at >95%/>95%%, and HER-2/berta negative.  After neoadjuvant endocrine therapy (letrozole), final path after right mastectomy showed a 5.2 cm mass with 3/8 macrometastatic LNs plus one LN with isolated tumor cell. Size of largest LN was 0.3cm. ([id number])    MammaPrint Index = +0.142; translating to Low Risk Luminal-Type A.     She completed 4 cycles of AC on 1/13/2023 after which chemotherapy was discontinued due to side effects and hospitalization. Patient therefore did not receive any Taxane      Currently on Arimidex and Abemaciclib.   Her abemaciclib was started as 100mg BID, which caused her counts to drop. Her counts are stable at the current dose of 50 mg BID.      Her ANC today is **.   CMP showed stable creatinine ***   (baseline 1.15, up to 1/54 on 10/2/2024).   AST/ALT - wnl ***      Last DEXA was 06/24/2024 - Normal     She has a port - she would like to keep it since she is a hard stick    Note - She has right breast capsulitis (tissue " expander in). Can cause 3/10 pain. Reconstructive surgery (BETTY) not planned while she is on abemaciclib.     Her WBC has been low since 12/11/2023 (the oldest data points visible in EPIC). I think this is due to abema (started in July of 2023).      She is to complete abema in July next year. Will continue to watch closely. The count is expected to recover after completing the abema therapy. Her ANC is good. Low WBC is due to lymphopenia     Plan  Continue Arimidex 1 mg by mouth daily (To complete 5 years in 6/30/2028. If 10 years, 6/30/2033)   Continue symptom management for joint pain. She is already on third AI. She states she can tolerate. Agreed to do at least 5 years of AI.    Continue Abemaciclib to 50mg by BID ([facility] providing the med)    Next bisphosphonate scheduled for 3/31/2025: switched from Zometa to denosumab due to rising creatinine  CBC with diff and CMP every 3 months.  Next before she come back on 2/24/2024.   Left mammogram in 12/2024, showing BIRAD-2 (benign).  Next left mammogram ordered by Dr. Robin on 3/24/2024 - scheduled for 12/18/2024. Will follow.  DEXA on 6/24/2024 was normal. She is already receiving bisphosphonate No need to repeat DEXA from medical oncology perspective.   She has SOB upon exertion.  Cardio oncology is following.  Last ECHo on 10/17/2024 was normal with EF of 62%.  Next ECHO and cardiology appt are scheduled for 4/17/2025  RTC in 3 months with lab and evaluation for continuing Abemaciclib and Arimidex  RTC on 2/24/2024       Anna Olson MD, PhD   Hematology/Oncology  [facility]     Valtrex Pregnancy And Lactation Text: this medication is Pregnancy Category B and is considered safe during pregnancy. This medication is not directly found in breast milk but it's metabolite acyclovir is present.

## 2024-12-02 ENCOUNTER — APPOINTMENT (OUTPATIENT)
Dept: HEMATOLOGY/ONCOLOGY | Facility: CLINIC | Age: 69
End: 2024-12-02
Payer: COMMERCIAL

## 2024-12-18 ENCOUNTER — APPOINTMENT (OUTPATIENT)
Dept: RADIOLOGY | Facility: CLINIC | Age: 69
End: 2024-12-18
Payer: COMMERCIAL

## 2024-12-22 NOTE — PROGRESS NOTES
Patient Visit Information:   Patient name: Radha Shabazz  : 1955  Date of Service: 2024    Visit Type: Follow-up      Cancer History:          Breast         AJCC Edition: 8th (AJCC), Diagnosis Date:           Cancer Staging   No matching staging information was found for the patient.       Treatment Synopsis:       Radha Shabazz is a 69 y.o. 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 The patient's breast cancer was diagnosed on 2021, and was grade 2, estrogen receptor positive at >95%/>95%, progesterone receptor positive at >95%/>95%%, and HER-2/berta negative.  After neoadjuvant endocrine therapy (letrozole), final path after right mastectomy showed a 5.2 cm mass with 3/8 macrometastatic LNs plus one LN with isolated tumor cell. Size of largest LN was 0.3cm. ([id number])    MammaPrint Index = +0.142; translating to Low Risk Luminal-Type A.      CURRENT THERAPY: Adjuvant Arimidex and Abemaciclib, Denosomab     ONCOLOGIC HISTORY:  Details of her breast cancer history are as follows:      2021: Patient underwent a screening mammogram. This showed a focal asymmetry in the central aspect of the right breast   12/15/2021: Patient underwent a right diagnostic mammogram and breast US. This revealed a mass at the 1:00, 6 cm from the nipple measuring 1.7 x 1.2 x 1.1 cm. Another mass measuring 0.5 cm was seen at 5:00, 4 cm from the nipple. Right axilla US revealed  3 unremarkable LNs. Breast tissue was extremely dense.   2021: Patient underwent US-guided core biopsies from two sites in the right breast, from 1:00, 6 cm from the nipple and from 9:00, 9 cm from the nipple. Pathology revealed above results.   MRI of the breast was attempted but patient could not tolerate it   2022: Patient was started on Letrozole 2.5 mg by mouth daily    4/15/2022: Completed a mid treatment US. This showed a stable to slight interval decrease in size of biopsy proven carcinoma.    06/13/2022: Completed right breast US. This showed interval decrease in size of biopsy proven carcinoma.   09/16/2022: Patient underwent a right mastectomy with SNLB and immediate reconstruction. Pathology revealed a 5.2 cm mass with 3/8 macrometastatic LNs plus one LN with isolated tumor cell. Size of largest LN was 0.3cm. ([id number])   09/17/2022: Evacuation of hematoma with 750 cc of estimated blood loss   10/06/2022: Revision of right reconstructed breast   12/02/2022: Received the first dose of AC   1/13/2023: Received the 4th and last dose of AC after which chemotherapy was discontinued due to side effects and hospitalization. Patient therefore did not receive any Taxane   04/07/2023: Started Exemestane   04/13/2023: Completed radiation   06/30/2023: Exemestane discontinued   06/30/2023: Started Anastrozole  July 2023: Started Abemaciclib  (getting 's supply - [facility])  3/6/2024: Started Zometa (every 24 weeks)  8/26/2024: Last dose of Zometa   10/9/2024: Her creatinine was noted to get worse. Switched from Zometa to Denosumab (every 24 weeks)  11/8/2024: Left pain X-ray for hip pain - degenerative changes    History of Present Illness: Patient ID:  Radha Shabazz is a 69 y.o. female.     Chief Complaint and/or reason for visit: Here for a follow-up     Interval History:    Radha Shabazz is a pleasant  69 y.o. 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 The patient's breast cancer was diagnosed on December 21, 2021, and was grade 2,  estrogen receptor positive at >95%/>95%, progesterone receptor positive at >95%/>95%%, and HER-2/berta negative.  MammaPrint  Index = +0.142; translating to Low Risk Luminal-Type A.   After neoadjuvant  endocrine therapy (letrozole), final path after right mastectomy showed a 5.2 cm mass with 3/8 macrometastatic LNs plus one LN with isolated tumor cell. Size of largest LN was 0.3cm. ([id number])     Here for a follow-up.     She had X-ray of hip fo rpain on 11/8/224, which showed generative disease. She says the pain is getting better and she is back exercising on the stationary bike.   She reports that she continues to feel occasional fatigue.     Chronic pain is stable - Her knees are the worst (right knee more than left, rating this as 7-8/10). She is getting her R knee replaced in the future (once she is off Abemaciclib).      SOB upon exertion (upon going up stairs) - stable. Cardio-oncology is following her. Recently she was told there is some calcium built up at mitral valve (shown on ECHO) and is taking 1/2 the dose of calcium.      She is a nurse (pain management), works 2 days a week.      She denies fever, chills, Wt loss, headaches, CP, N/V, abdominal pain, constipation, diarrhea, neuropathy, extremity swelling, rashes. Appetite is good and she is drinking fine.     Review of Systems:   A 14-point review of system was completed and was negative except for what is noted in HPI.      Allergies and Intolerances:       Allergies:   Allergies   Allergen Reactions    Iodine Hives    Iodinated Contrast Media Rash, Swelling and Unknown    Sulfa (Sulfonamide Antibiotics) Rash, Swelling, Unknown and Hives             Outpatient Medication Profile:   Current Outpatient Medications on File Prior to Visit   Medication Sig Dispense Refill    anastrozole (Arimidex) 1 mg tablet Take 1 tablet (1 mg total) by mouth once daily.  Swallow whole with a drink of water. 30 tablet 5    anastrozole (Arimidex) 1 mg tablet Take 1 tablet (1 mg total) by mouth once daily.  Swallow whole with a drink of water. 90 tablet 3    escitalopram (Lexapro) 5 mg tablet Take 1 tablet (5 mg) by mouth once daily.      nitrofurantoin,  macrocrystal-monohydrate, (Macrobid) 100 mg capsule Take 1 capsule (100 mg) by mouth every 12 hours. 14 capsule 0    omeprazole (PriLOSEC) 20 mg DR capsule Take 1 capsule (20 mg) by mouth once daily.      sodium hyaluronate (Durolane) 60 mg/3 mL injection Inject one syringe(60mg/3mL) into right knee one time at doctors office 3 mL 0    solifenacin (VESIcare) 10 mg tablet Take 1 tablet (10 mg) by mouth once daily. 90 tablet 3    topiramate (Topamax) 25 mg tablet Take 1 tablet (25 mg) by mouth once daily as needed.      UNABLE TO FIND Breast prosthesis for left and/or right breast      UNABLE TO FIND Mastectomy Bra for Right Mastectomy       Current Facility-Administered Medications on File Prior to Visit   Medication Dose Route Frequency Provider Last Rate Last Admin    aspirin EC tablet 81 mg  81 mg oral Once Elizabeth Trujillo, APRN-CNP           Medical History:    Past Medical History:   Diagnosis Date    Breast cancer (Multi)     Encounter for gynecological examination (general) (routine) without abnormal findings     Encounter for gynecological examination without abnormal finding    Encounter for screening mammogram for malignant neoplasm of breast     Visit for screening mammogram    Personal history of irradiation     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    Personal history of other diseases of the circulatory system     History of hypertension       Surgical History:   Past Surgical History:   Procedure Laterality Date    APPENDECTOMY  10/05/2015    Appendectomy    HYSTERECTOMY  10/05/2015    Supracervical Hysterectomy    MASTECTOMY Right 2022    OTHER SURGICAL HISTORY  06/27/2022    Tongue surgery    OTHER SURGICAL HISTORY  06/27/2022    Neck dissection modified radical    OTHER SURGICAL HISTORY  08/04/2020    Split thickness skin graft    TOTAL ABDOMINAL HYSTERECTOMY W/ BILATERAL SALPINGOOPHORECTOMY  10/05/2015    Salpingo-oophorectomy Bilateral        Social Substance History:   Social History     Tobacco Use    Smoking status: Never    Smokeless tobacco: Never   Substance Use Topics    Alcohol use: Not on file     Social History     Substance and Sexual Activity   Drug Use Not on file       Family History:   Family History   Problem Relation Name Age of Onset    Aortic stenosis Mother      Diabetes Father      Stomach cancer Father      Colon cancer Father's Brother      Cancer Other          OBJECTIVE:     Visit Vitals  Temp:  [36.5 °C (97.7 °F)] 36.5 °C (97.7 °F)  Heart Rate:  [62] 62  BP: (128)/(83) 128/83    Pain Scale: 7-8/10 (chronic knee pain - stable)        The ECOG performance scale today is Asymptomatic/0           Physical Exam:      Constitutional: Well developed, awake/alert/oriented  x3, no distress, alert and cooperative   Eyes: PERRL, EOMI, clear sclera   ENMT: mucous membranes moist, no apparent injury,  no lesions seen   Head/Neck: Neck supple, no apparent injury, thyroid  without mass or tenderness, No JVD, trachea midline, no bruits   Respiratory/Thorax: Patent airways, CTAB, normal  breath sounds with good chest expansion, thorax symmetric   Cardiovascular: Regular, rate and rhythm, no murmurs,  2+ equal pulses of the extremities, normal S 1and S 2   Gastrointestinal: Nondistended, soft, non-tender,  no rebound tenderness or guarding, no masses palpable, no organomegaly, +BS, no bruits   Musculoskeletal: ROM intact, no joint swelling, normal  strength   Extremities: No LE edema   Neurological: alert and oriented x3, intact senses,  motor, response and reflexes, normal strength   Breast: s/p Right mastectomy with implant reconstruction with well healed surgical incision. No palpable mass in the left breast. No palpable axillary or supraclavicular lymphadenopathies bilaterally. No swelling of either upper extremity which had free range of motion.    Lymphatic: No significant lymphadenopathy   Psychological: Appropriate mood and behavior  "  Skin: Warm and dry, no lesions, no rashes          LAB RESULTS    Lab Results   Component Value Date    WBC 2.6 (L) 12/23/2024    HGB 12.5 12/23/2024    HCT 36.2 12/23/2024    MCV 98 12/23/2024     12/23/2024     Lab Results   Component Value Date    NEUTROABS 1.29 12/23/2024       Recent Labs     12/23/24  0852   CREATININE 1.26*   BUN 24*      K 4.2      CO2 25       No components found for: \"CALCGFR\"  Lab Results   Component Value Date    GLUCOSE 85 12/23/2024     Lab Results   Component Value Date     12/23/2024    K 4.2 12/23/2024     12/23/2024    CO2 25 12/23/2024    BUN 24 (H) 12/23/2024    CREATININE 1.26 (H) 12/23/2024    ALT 18 12/23/2024    AST 21 12/23/2024    ALKPHOS 50 12/23/2024    BILITOT 0.5 12/23/2024     No results found for: \"FOLATE\"  No results found for: \"ETIIQNCH37\"  Lab Results   Component Value Date    TSH 0.77 02/15/2023        Imaging:      No images are attached to the encounter.        Assessment and Plan:   John Shabazz is a 69 y.o. 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 The patient's breast cancer was diagnosed on December 21, 2021, and was grade 2, estrogen receptor positive at >95%/>95%, progesterone receptor positive at >95%/>95%%, and HER-2/berta negative.  After neoadjuvant endocrine therapy (letrozole), final path after right mastectomy showed a 5.2 cm mass with 3/8 macrometastatic LNs plus one LN with isolated tumor cell. Size of largest LN was 0.3cm. ([id number])    MammaPrint Index = +0.142; translating to Low Risk Luminal-Type A.     She completed 4 cycles of AC on 1/13/2023 after which chemotherapy was discontinued due to side effects and hospitalization. Patient therefore did not receive any Taxane      Currently on Arimidex and Abemaciclib.   Her abemaciclib was started as 100mg " BID, which caused her counts to drop. Her counts are stable at the current dose of 50 mg BID.      Her ANC today is 1.29.   CMP showed stable creatinine 1.26   (baseline 1.15, up to 1.54 on 10/2/2024).   AST/ALT - wnl       Last DEXA was 06/24/2024 - Normal     She has a port - she would like to keep it since she is a hard stick    Note - She has right breast capsulitis (tissue expander in). Can cause 3/10 pain. Reconstructive surgery (BETTY) not planned while she is on abemaciclib.     Her WBC has been low since 12/11/2023 (the oldest data points visible in EPIC). I think this is due to abema (started in July of 2023).      She is to complete abema in July of 2025. Will continue to watch closely. The count is expected to recover after completing the abema therapy. Her ANC has always been good. Low WBC is due to lymphopenia.    Today's lab (12/23/2024) - Results pending, to be reviewed.      Plan  Continue Arimidex 1 mg by mouth daily (To complete 5 years in 6/30/2028. If 10 years, 6/30/2033)   Continue symptom management for joint pain. She is already on third AI. She states she can tolerate. Agreed to do at least 5 years of AI.    Continue Abemaciclib to 50mg by BID ([facility] providing the med)  - complete July 2025.  Next bisphosphonate scheduled for 3/31/2025: switched from Zometa to denosumab due to rising creatinine  CBC with diff and CMP every 3 months.  Next before she come back on 2/24/2024.   Left mammogram in 12/2024, showing BIRAD-2 (benign).  Next left mammogram ordered by Dr. Robin on 3/24/2024 - scheduled for 12/18/2024. Will follow.  DEXA on 6/24/2024 was normal. She is already receiving bisphosphonate No need to repeat DEXA from medical oncology perspective.   She has SOB upon exertion.  Cardio oncology is following.  Last ECHo on 10/17/2024 was normal with EF of 62%.  Next ECHO and cardiology appt are scheduled for 4/17/2025  RTC in 3 months with lab and evaluation for continuing Abemaciclib and  Arimidex, denosomab  RTC on 3/31/2025 (with next denosomab infusion)      Anna Olson MD, PhD   Hematology/Oncology  [facility]

## 2024-12-23 ENCOUNTER — ONCOLOGY MEDICATION OUTREACH (OUTPATIENT)
Dept: HEMATOLOGY/ONCOLOGY | Facility: CLINIC | Age: 69
End: 2024-12-23

## 2024-12-23 ENCOUNTER — HOSPITAL ENCOUNTER (OUTPATIENT)
Dept: RADIOLOGY | Facility: CLINIC | Age: 69
Discharge: HOME | End: 2024-12-23
Payer: COMMERCIAL

## 2024-12-23 ENCOUNTER — OFFICE VISIT (OUTPATIENT)
Dept: HEMATOLOGY/ONCOLOGY | Facility: CLINIC | Age: 69
End: 2024-12-23
Payer: COMMERCIAL

## 2024-12-23 ENCOUNTER — LAB (OUTPATIENT)
Dept: LAB | Facility: LAB | Age: 69
End: 2024-12-23
Payer: COMMERCIAL

## 2024-12-23 VITALS
HEART RATE: 62 BPM | DIASTOLIC BLOOD PRESSURE: 83 MMHG | BODY MASS INDEX: 32.65 KG/M2 | TEMPERATURE: 97.7 F | SYSTOLIC BLOOD PRESSURE: 128 MMHG | WEIGHT: 196.21 LBS

## 2024-12-23 DIAGNOSIS — Z17.0 MALIGNANT NEOPLASM OF UPPER-INNER QUADRANT OF RIGHT BREAST IN FEMALE, ESTROGEN RECEPTOR POSITIVE: ICD-10-CM

## 2024-12-23 DIAGNOSIS — C50.211 MALIGNANT NEOPLASM OF UPPER-INNER QUADRANT OF RIGHT BREAST IN FEMALE, ESTROGEN RECEPTOR POSITIVE: ICD-10-CM

## 2024-12-23 DIAGNOSIS — C50.211 MALIGNANT NEOPLASM OF UPPER-INNER QUADRANT OF RIGHT BREAST IN FEMALE, ESTROGEN RECEPTOR POSITIVE: Primary | ICD-10-CM

## 2024-12-23 DIAGNOSIS — Z17.0 MALIGNANT NEOPLASM OF UPPER-INNER QUADRANT OF RIGHT BREAST IN FEMALE, ESTROGEN RECEPTOR POSITIVE: Primary | ICD-10-CM

## 2024-12-23 DIAGNOSIS — R06.9 UNSPECIFIED ABNORMALITIES OF BREATHING: ICD-10-CM

## 2024-12-23 DIAGNOSIS — Z79.899 ENCOUNTER FOR MONITORING CARDIOTOXIC DRUG THERAPY: ICD-10-CM

## 2024-12-23 DIAGNOSIS — Z51.81 ENCOUNTER FOR MONITORING CARDIOTOXIC DRUG THERAPY: ICD-10-CM

## 2024-12-23 LAB
ALBUMIN SERPL BCP-MCNC: 4.1 G/DL (ref 3.4–5)
ALP SERPL-CCNC: 50 U/L (ref 33–136)
ALT SERPL W P-5'-P-CCNC: 18 U/L (ref 7–45)
ANION GAP SERPL CALC-SCNC: 11 MMOL/L (ref 10–20)
AST SERPL W P-5'-P-CCNC: 21 U/L (ref 9–39)
BASOPHILS # BLD AUTO: 0.05 X10*3/UL (ref 0–0.1)
BASOPHILS NFR BLD AUTO: 2 %
BILIRUB SERPL-MCNC: 0.5 MG/DL (ref 0–1.2)
BNP SERPL-MCNC: 46 PG/ML (ref 0–99)
BUN SERPL-MCNC: 24 MG/DL (ref 6–23)
CALCIUM SERPL-MCNC: 10.7 MG/DL (ref 8.6–10.3)
CARDIAC TROPONIN I PNL SERPL HS: 10 NG/L (ref 0–13)
CHLORIDE SERPL-SCNC: 107 MMOL/L (ref 98–107)
CO2 SERPL-SCNC: 25 MMOL/L (ref 21–32)
CREAT SERPL-MCNC: 1.26 MG/DL (ref 0.5–1.05)
EGFRCR SERPLBLD CKD-EPI 2021: 47 ML/MIN/1.73M*2
EOSINOPHIL # BLD AUTO: 0.08 X10*3/UL (ref 0–0.7)
EOSINOPHIL NFR BLD AUTO: 3.1 %
ERYTHROCYTE [DISTWIDTH] IN BLOOD BY AUTOMATED COUNT: 11.9 % (ref 11.5–14.5)
GLUCOSE SERPL-MCNC: 85 MG/DL (ref 74–99)
HCT VFR BLD AUTO: 36.2 % (ref 36–46)
HGB BLD-MCNC: 12.5 G/DL (ref 12–16)
IMM GRANULOCYTES # BLD AUTO: 0 X10*3/UL (ref 0–0.7)
IMM GRANULOCYTES NFR BLD AUTO: 0 % (ref 0–0.9)
LYMPHOCYTES # BLD AUTO: 0.87 X10*3/UL (ref 1.2–4.8)
LYMPHOCYTES NFR BLD AUTO: 34 %
MCH RBC QN AUTO: 33.9 PG (ref 26–34)
MCHC RBC AUTO-ENTMCNC: 34.5 G/DL (ref 32–36)
MCV RBC AUTO: 98 FL (ref 80–100)
MONOCYTES # BLD AUTO: 0.27 X10*3/UL (ref 0.1–1)
MONOCYTES NFR BLD AUTO: 10.5 %
NEUTROPHILS # BLD AUTO: 1.29 X10*3/UL (ref 1.2–7.7)
NEUTROPHILS NFR BLD AUTO: 50.4 %
NRBC BLD-RTO: 0 /100 WBCS (ref 0–0)
PLATELET # BLD AUTO: 175 X10*3/UL (ref 150–450)
POTASSIUM SERPL-SCNC: 4.2 MMOL/L (ref 3.5–5.3)
PROT SERPL-MCNC: 6.5 G/DL (ref 6.4–8.2)
RBC # BLD AUTO: 3.69 X10*6/UL (ref 4–5.2)
SODIUM SERPL-SCNC: 139 MMOL/L (ref 136–145)
WBC # BLD AUTO: 2.6 X10*3/UL (ref 4.4–11.3)

## 2024-12-23 PROCEDURE — 99214 OFFICE O/P EST MOD 30 MIN: CPT | Performed by: INTERNAL MEDICINE

## 2024-12-23 PROCEDURE — G0279 TOMOSYNTHESIS, MAMMO: HCPCS | Mod: LEFT SIDE | Performed by: STUDENT IN AN ORGANIZED HEALTH CARE EDUCATION/TRAINING PROGRAM

## 2024-12-23 PROCEDURE — 85025 COMPLETE CBC W/AUTO DIFF WBC: CPT

## 2024-12-23 PROCEDURE — 36415 COLL VENOUS BLD VENIPUNCTURE: CPT

## 2024-12-23 PROCEDURE — 1126F AMNT PAIN NOTED NONE PRSNT: CPT | Performed by: INTERNAL MEDICINE

## 2024-12-23 PROCEDURE — 3079F DIAST BP 80-89 MM HG: CPT | Performed by: INTERNAL MEDICINE

## 2024-12-23 PROCEDURE — 1036F TOBACCO NON-USER: CPT | Performed by: INTERNAL MEDICINE

## 2024-12-23 PROCEDURE — 77061 BREAST TOMOSYNTHESIS UNI: CPT | Mod: LT

## 2024-12-23 PROCEDURE — 83880 ASSAY OF NATRIURETIC PEPTIDE: CPT

## 2024-12-23 PROCEDURE — 80053 COMPREHEN METABOLIC PANEL: CPT

## 2024-12-23 PROCEDURE — 77065 DX MAMMO INCL CAD UNI: CPT | Mod: LEFT SIDE | Performed by: STUDENT IN AN ORGANIZED HEALTH CARE EDUCATION/TRAINING PROGRAM

## 2024-12-23 PROCEDURE — 1159F MED LIST DOCD IN RCRD: CPT | Performed by: INTERNAL MEDICINE

## 2024-12-23 PROCEDURE — 3074F SYST BP LT 130 MM HG: CPT | Performed by: INTERNAL MEDICINE

## 2024-12-23 PROCEDURE — 84484 ASSAY OF TROPONIN QUANT: CPT

## 2024-12-23 ASSESSMENT — PAIN SCALES - GENERAL: PAINLEVEL_OUTOF10: 0-NO PAIN

## 2024-12-23 NOTE — PATIENT INSTRUCTIONS
Please schedule port flush for end of December and in February.  Next treatment on 3/31  Schedule follow up with Dr. Hernandez on 3/31/25.  Please call [phone] (option 5, then option 2) with symptoms, questions or concerns.

## 2024-12-30 ENCOUNTER — INFUSION (OUTPATIENT)
Dept: HEMATOLOGY/ONCOLOGY | Facility: CLINIC | Age: 69
End: 2024-12-30
Payer: COMMERCIAL

## 2024-12-30 VITALS
OXYGEN SATURATION: 98 % | RESPIRATION RATE: 18 BRPM | SYSTOLIC BLOOD PRESSURE: 126 MMHG | HEART RATE: 66 BPM | DIASTOLIC BLOOD PRESSURE: 86 MMHG | TEMPERATURE: 97.2 F

## 2024-12-30 DIAGNOSIS — C50.211 MALIGNANT NEOPLASM OF UPPER-INNER QUADRANT OF RIGHT BREAST IN FEMALE, ESTROGEN RECEPTOR POSITIVE: ICD-10-CM

## 2024-12-30 DIAGNOSIS — Z17.0 MALIGNANT NEOPLASM OF UPPER-INNER QUADRANT OF RIGHT BREAST IN FEMALE, ESTROGEN RECEPTOR POSITIVE: ICD-10-CM

## 2024-12-30 PROCEDURE — 96523 IRRIG DRUG DELIVERY DEVICE: CPT

## 2024-12-30 RX ORDER — HEPARIN 100 UNIT/ML
500 SYRINGE INTRAVENOUS AS NEEDED
OUTPATIENT
Start: 2024-12-30

## 2024-12-30 RX ORDER — HEPARIN SODIUM,PORCINE/PF 10 UNIT/ML
50 SYRINGE (ML) INTRAVENOUS AS NEEDED
OUTPATIENT
Start: 2024-12-30

## 2024-12-30 ASSESSMENT — PAIN SCALES - GENERAL: PAINLEVEL_OUTOF10: 0-NO PAIN

## 2025-01-20 ENCOUNTER — SOCIAL WORK (OUTPATIENT)
Dept: CASE MANAGEMENT | Facility: HOSPITAL | Age: 70
End: 2025-01-20
Payer: COMMERCIAL

## 2025-01-20 DIAGNOSIS — Z17.0 MALIGNANT NEOPLASM OF UPPER-INNER QUADRANT OF RIGHT BREAST IN FEMALE, ESTROGEN RECEPTOR POSITIVE: ICD-10-CM

## 2025-01-20 DIAGNOSIS — C50.211 MALIGNANT NEOPLASM OF UPPER-INNER QUADRANT OF RIGHT BREAST IN FEMALE, ESTROGEN RECEPTOR POSITIVE: ICD-10-CM

## 2025-01-20 RX ORDER — ANASTROZOLE 1 MG/1
1 TABLET ORAL DAILY
Qty: 90 TABLET | Refills: 1 | Status: SHIPPED | OUTPATIENT
Start: 2025-01-20

## 2025-01-20 NOTE — TELEPHONE ENCOUNTER
Radha Shabazz called the refill line for Anastrozole. Medication pended to team to approve and submit. Next FUV is March 2025.

## 2025-01-20 NOTE — PROGRESS NOTES
SW received phone call from patient today. She reported she never received anything from [facility] from renewing her patient assistance program. SW called [employer] [phone] and a new application is needed. SW called patient to make her aware she can apply inline gwen d portable or print out form. AMANDA informed her that SW will take care of the aprt for provider to sign. Amanda encouraged her to let SW know if she has issues. AMANDA sent RX for Verzenio from [facility] to Dr. Olson today to sign.    Update: AMANDA received signed RX. AMANDA faxed form to [employer] Patient Assistance program at [phone].    1/21/2025: AMANDA called [facility] today to check on the status of the application. SW was informed patient submitted her portion of the application but page 8 is needed by the provider. AMANDA sent form to Dr. Olson to sign and AMANDA will fax to oncology medication fax at [phone].

## 2025-01-22 ENCOUNTER — SOCIAL WORK (OUTPATIENT)
Dept: CASE MANAGEMENT | Facility: HOSPITAL | Age: 70
End: 2025-01-22
Payer: COMMERCIAL

## 2025-01-22 NOTE — PROGRESS NOTES
DB received signed healthcare Provider/Prescriber Section form from Dr. Olson. DB faxed it to [facility] today at oncology fax [phone].

## 2025-01-23 ENCOUNTER — SOCIAL WORK (OUTPATIENT)
Dept: CASE MANAGEMENT | Facility: HOSPITAL | Age: 70
End: 2025-01-23
Payer: COMMERCIAL

## 2025-01-23 NOTE — PROGRESS NOTES
SW contacted patient today regarding the status of [facility] application.  SW shared that she was eligible and received notice that patient has been enrolled. Patient was appreciative of DB assistance.

## 2025-02-10 ENCOUNTER — INFUSION (OUTPATIENT)
Dept: HEMATOLOGY/ONCOLOGY | Facility: CLINIC | Age: 70
End: 2025-02-10
Payer: COMMERCIAL

## 2025-02-10 VITALS
OXYGEN SATURATION: 99 % | BODY MASS INDEX: 33.04 KG/M2 | WEIGHT: 198.52 LBS | TEMPERATURE: 95.2 F | RESPIRATION RATE: 16 BRPM | HEART RATE: 65 BPM | DIASTOLIC BLOOD PRESSURE: 87 MMHG | SYSTOLIC BLOOD PRESSURE: 125 MMHG

## 2025-02-10 DIAGNOSIS — Z17.0 MALIGNANT NEOPLASM OF UPPER-INNER QUADRANT OF RIGHT BREAST IN FEMALE, ESTROGEN RECEPTOR POSITIVE: ICD-10-CM

## 2025-02-10 DIAGNOSIS — C50.211 MALIGNANT NEOPLASM OF UPPER-INNER QUADRANT OF RIGHT BREAST IN FEMALE, ESTROGEN RECEPTOR POSITIVE: ICD-10-CM

## 2025-02-10 PROCEDURE — 36591 DRAW BLOOD OFF VENOUS DEVICE: CPT

## 2025-02-10 PROCEDURE — 2500000004 HC RX 250 GENERAL PHARMACY W/ HCPCS (ALT 636 FOR OP/ED): Performed by: INTERNAL MEDICINE

## 2025-02-10 PROCEDURE — 80053 COMPREHEN METABOLIC PANEL: CPT

## 2025-02-10 RX ORDER — HEPARIN 100 UNIT/ML
500 SYRINGE INTRAVENOUS AS NEEDED
Status: DISCONTINUED | OUTPATIENT
Start: 2025-02-10 | End: 2025-02-10 | Stop reason: HOSPADM

## 2025-02-10 RX ORDER — HEPARIN SODIUM,PORCINE/PF 10 UNIT/ML
50 SYRINGE (ML) INTRAVENOUS AS NEEDED
Status: DISCONTINUED | OUTPATIENT
Start: 2025-02-10 | End: 2025-02-10 | Stop reason: HOSPADM

## 2025-02-10 RX ORDER — HEPARIN 100 UNIT/ML
500 SYRINGE INTRAVENOUS AS NEEDED
OUTPATIENT
Start: 2025-02-10

## 2025-02-10 RX ORDER — HEPARIN SODIUM,PORCINE/PF 10 UNIT/ML
50 SYRINGE (ML) INTRAVENOUS AS NEEDED
OUTPATIENT
Start: 2025-02-10

## 2025-02-10 RX ADMIN — HEPARIN 500 UNITS: 100 SYRINGE at 10:29

## 2025-02-10 ASSESSMENT — PAIN SCALES - GENERAL: PAINLEVEL_OUTOF10: 0-NO PAIN

## 2025-02-11 LAB
ALBUMIN SERPL BCP-MCNC: 4 G/DL (ref 3.4–5)
ALP SERPL-CCNC: 54 U/L (ref 33–136)
ALT SERPL W P-5'-P-CCNC: 15 U/L (ref 7–45)
ANION GAP SERPL CALC-SCNC: 14 MMOL/L (ref 10–20)
AST SERPL W P-5'-P-CCNC: 18 U/L (ref 9–39)
BILIRUB SERPL-MCNC: 0.5 MG/DL (ref 0–1.2)
BUN SERPL-MCNC: 24 MG/DL (ref 6–23)
CALCIUM SERPL-MCNC: 10.3 MG/DL (ref 8.6–10.6)
CHLORIDE SERPL-SCNC: 105 MMOL/L (ref 98–107)
CO2 SERPL-SCNC: 23 MMOL/L (ref 21–32)
CREAT SERPL-MCNC: 1.14 MG/DL (ref 0.5–1.05)
EGFRCR SERPLBLD CKD-EPI 2021: 53 ML/MIN/1.73M*2
GLUCOSE SERPL-MCNC: 81 MG/DL (ref 74–99)
POTASSIUM SERPL-SCNC: 4.2 MMOL/L (ref 3.5–5.3)
PROT SERPL-MCNC: 6.3 G/DL (ref 6.4–8.2)
SODIUM SERPL-SCNC: 138 MMOL/L (ref 136–145)

## 2025-02-24 ENCOUNTER — HOSPITAL ENCOUNTER (OUTPATIENT)
Dept: RADIOLOGY | Facility: CLINIC | Age: 70
Discharge: HOME | End: 2025-02-24
Payer: COMMERCIAL

## 2025-02-24 ENCOUNTER — OFFICE VISIT (OUTPATIENT)
Dept: ORTHOPEDIC SURGERY | Facility: CLINIC | Age: 70
End: 2025-02-24
Payer: COMMERCIAL

## 2025-02-24 VITALS — WEIGHT: 185 LBS | BODY MASS INDEX: 29.73 KG/M2 | HEIGHT: 66 IN

## 2025-02-24 DIAGNOSIS — G89.29 CHRONIC PAIN OF RIGHT KNEE: ICD-10-CM

## 2025-02-24 DIAGNOSIS — R73.9 HYPERGLYCEMIA: ICD-10-CM

## 2025-02-24 DIAGNOSIS — M25.561 CHRONIC PAIN OF RIGHT KNEE: Primary | ICD-10-CM

## 2025-02-24 DIAGNOSIS — M25.561 CHRONIC PAIN OF RIGHT KNEE: ICD-10-CM

## 2025-02-24 DIAGNOSIS — G89.29 CHRONIC PAIN OF RIGHT KNEE: Primary | ICD-10-CM

## 2025-02-24 PROCEDURE — 73564 X-RAY EXAM KNEE 4 OR MORE: CPT | Mod: RT

## 2025-02-24 PROCEDURE — 2500000004 HC RX 250 GENERAL PHARMACY W/ HCPCS (ALT 636 FOR OP/ED): Performed by: STUDENT IN AN ORGANIZED HEALTH CARE EDUCATION/TRAINING PROGRAM

## 2025-02-24 PROCEDURE — 1159F MED LIST DOCD IN RCRD: CPT | Performed by: STUDENT IN AN ORGANIZED HEALTH CARE EDUCATION/TRAINING PROGRAM

## 2025-02-24 PROCEDURE — 73564 X-RAY EXAM KNEE 4 OR MORE: CPT | Mod: RIGHT SIDE | Performed by: RADIOLOGY

## 2025-02-24 PROCEDURE — 99215 OFFICE O/P EST HI 40 MIN: CPT | Performed by: STUDENT IN AN ORGANIZED HEALTH CARE EDUCATION/TRAINING PROGRAM

## 2025-02-24 PROCEDURE — 1125F AMNT PAIN NOTED PAIN PRSNT: CPT | Performed by: STUDENT IN AN ORGANIZED HEALTH CARE EDUCATION/TRAINING PROGRAM

## 2025-02-24 PROCEDURE — 20610 DRAIN/INJ JOINT/BURSA W/O US: CPT | Mod: RT | Performed by: STUDENT IN AN ORGANIZED HEALTH CARE EDUCATION/TRAINING PROGRAM

## 2025-02-24 PROCEDURE — 3008F BODY MASS INDEX DOCD: CPT | Performed by: STUDENT IN AN ORGANIZED HEALTH CARE EDUCATION/TRAINING PROGRAM

## 2025-02-24 PROCEDURE — 99215 OFFICE O/P EST HI 40 MIN: CPT | Mod: 25 | Performed by: STUDENT IN AN ORGANIZED HEALTH CARE EDUCATION/TRAINING PROGRAM

## 2025-02-24 RX ORDER — TRIAMCINOLONE ACETONIDE 40 MG/ML
40 INJECTION, SUSPENSION INTRA-ARTICULAR; INTRAMUSCULAR
Status: COMPLETED | OUTPATIENT
Start: 2025-02-24 | End: 2025-02-24

## 2025-02-24 RX ORDER — LIDOCAINE HYDROCHLORIDE 10 MG/ML
4 INJECTION, SOLUTION INFILTRATION; PERINEURAL
Status: COMPLETED | OUTPATIENT
Start: 2025-02-24 | End: 2025-02-24

## 2025-02-24 RX ADMIN — LIDOCAINE HYDROCHLORIDE 4 ML: 10 INJECTION, SOLUTION INFILTRATION; PERINEURAL at 12:03

## 2025-02-24 RX ADMIN — TRIAMCINOLONE ACETONIDE 40 MG: 400 INJECTION, SUSPENSION INTRA-ARTICULAR; INTRAMUSCULAR at 12:03

## 2025-02-24 ASSESSMENT — PAIN DESCRIPTION - DESCRIPTORS: DESCRIPTORS: ACHING

## 2025-02-24 ASSESSMENT — PAIN SCALES - GENERAL: PAINLEVEL_OUTOF10: 9

## 2025-02-24 ASSESSMENT — PAIN - FUNCTIONAL ASSESSMENT: PAIN_FUNCTIONAL_ASSESSMENT: 0-10

## 2025-02-24 NOTE — PROGRESS NOTES
VICTOR M/TKA Related Summary           L hip: N  L knee: N  R hip: N  R knee: N  Lumbar surgery or significant pathology: N            CC/SUBJECTIVE/HPI            Referring provider: No ref. provider found  Radha Shabazz is a 69 y.o. female presenting with R knee pain. She has had pain for many years and has tried a variety of conservative treatments listed below.  Unfortunately, these are no longer providing adequate relief..    R knee  Symptoms  Pain: 9/10  Onset: chronic/gradual  Duration: >2yrs  Location: front knee  Quality: sharp/stab  Limitations: morning stiffness, pain after activity, limp, weakness, difficulty with stairs, and difficulty in/out of chair/car  Ambulation limit due to pain: <100ft  Other symptoms: none  Treatment  Tried: activity modification, bracing, PT, home exercises, ice/heat, topical gel (ie Voltaren), OTCs, narcotics, and HA  Most recent injection <3mo ago? N  Assistive device: none  Treatment attempted for >2yrs and is no longer effective            HISTORIES (System Generated and Pt Reported on Form Today)       Dental  Pt reported: No active issues     PMH  Pt reported: Denies heart/lung/kidney/liver issues, DM, stroke, seizure, bariatric surgery, anticoag, MRSA, cancer, personal/familial coagulopathies except: none in addition to below  System generated (PMH, problem list both included since EMR change caused discrepancies):   Past Medical History:   Diagnosis Date    Breast cancer (Multi)     Encounter for gynecological examination (general) (routine) without abnormal findings     Encounter for gynecological examination without abnormal finding    Encounter for screening mammogram for malignant neoplasm of breast     Visit for screening mammogram    Personal history of irradiation     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    Personal history of other diseases of the circulatory system     History of hypertension     Patient Active  Problem List   Diagnosis    SCC (squamous cell carcinoma)    Abnormal finding on breast imaging    Anisometropia    Benign essential hypertension    Blepharitis of both eyes    Breast cancer of upper-inner quadrant of right female breast    Breast mass, right    Cortical age-related cataract of both eyes    Fluid collection at surgical site    Generalized weakness    Fatigue    Mixed incontinence    Urinary incontinence    Myopia of both eyes with astigmatism and presbyopia    Nephrolithiasis    Nuclear sclerosis of both eyes    Obese    Osteoarthritis of right knee    Osteoporosis    Primary localized osteoarthrosis of right lower leg    Recurrent UTI    Shortness of breath at rest    Thyroid nodule    Trochanteric bursitis of left hip    BRCA negative    Malignant neoplasm of central portion of right breast in female, estrogen receptor positive    Menopause    Greater trochanteric bursitis of left hip    It band syndrome, left     PSH  Pt reported: Per above.   System generated:   Past Surgical History:   Procedure Laterality Date    APPENDECTOMY  10/05/2015    Appendectomy    HYSTERECTOMY  10/05/2015    Supracervical Hysterectomy    MASTECTOMY Right 2022    OTHER SURGICAL HISTORY  06/27/2022    Tongue surgery    OTHER SURGICAL HISTORY  06/27/2022    Neck dissection modified radical    OTHER SURGICAL HISTORY  08/04/2020    Split thickness skin graft    TOTAL ABDOMINAL HYSTERECTOMY W/ BILATERAL SALPINGOOPHORECTOMY  10/05/2015    Salpingo-oophorectomy Bilateral     Family Hx  Pt reported clot/coagulopathies: none    Social Hx  Pt reported substance use: per below  System generated:   Social History     Tobacco Use    Smoking status: Never    Smokeless tobacco: Never     Allergies  Pt reported (meds, metals): per below  System generated:   Allergies   Allergen Reactions    Iodine Hives    Iodinated Contrast Media Rash, Swelling and Unknown    Sulfa (Sulfonamide Antibiotics) Rash, Swelling, Unknown and Hives  "    Current Meds  System generated:   Current Outpatient Medications:     anastrozole (Arimidex) 1 mg tablet, Take 1 tablet (1 mg total) by mouth once daily.  Swallow whole with a drink of water., Disp: 90 tablet, Rfl: 3    anastrozole (Arimidex) 1 mg tablet, Take 1 tablet (1 mg total) by mouth once daily.  Swallow whole with a drink of water., Disp: 90 tablet, Rfl: 1    escitalopram (Lexapro) 5 mg tablet, Take 1 tablet (5 mg) by mouth once daily., Disp: , Rfl:     nitrofurantoin, macrocrystal-monohydrate, (Macrobid) 100 mg capsule, Take 1 capsule (100 mg) by mouth every 12 hours., Disp: 14 capsule, Rfl: 0    omeprazole (PriLOSEC) 20 mg DR capsule, Take 1 capsule (20 mg) by mouth once daily., Disp: , Rfl:     sodium hyaluronate (Durolane) 60 mg/3 mL injection, Inject one syringe(60mg/3mL) into right knee one time at doctors office, Disp: 3 mL, Rfl: 0    solifenacin (VESIcare) 10 mg tablet, Take 1 tablet (10 mg) by mouth once daily., Disp: 90 tablet, Rfl: 3    topiramate (Topamax) 25 mg tablet, Take 1 tablet (25 mg) by mouth once daily as needed., Disp: , Rfl:     UNABLE TO FIND, Breast prosthesis for left and/or right breast, Disp: , Rfl:     UNABLE TO FIND, Mastectomy Bra for Right Mastectomy, Disp: , Rfl:     Current Facility-Administered Medications:     aspirin EC tablet 81 mg, 81 mg, oral, Once, Elizabeth Trujillo, APRN-CNP    ROS: Neg except HPI            OBJECTIVE            Physical exam  Estimated body mass index is 33.04 kg/m² as calculated from the following:    Height as of 7/10/24: 1.651 m (5' 5\").    Weight as of 2/10/25: 90.1 kg (198 lb 8.4 oz).  Gen/psych: NAD, conversational, appropriate    Ambulation  Gait: antalgic  Assistive device: none  Spine  Lumbar tenderness: neg  Limited ROM: neg, with no radiation or pain with flex/ex, lateral bending  SLR test: neg    Focused MSK exam: R  Knee  Skin/incision: normal  Effusion: mild  Alignment: varus (partially correctable)  Knee tenderness: " diffuse  Crepitation: moderate  Extension: passive flexion contracture 10° and active extension lag 0°  Flexion: 100°  Anterior drawer: stable  Posterior drawer: stable  Varus/Valgus in extension: stable  Varus/Valgus in flexion: stable  Referred pain to knee with hip 90° flexion and 45° ER/IR: neg  Neurovascular    Strength: 5/5 hip/knee/ankle flexion and extension  Sensory (L2-S1): SILT throughout lower extremity  Edema/stasis: no pitting edema  Pulse: DP 1+, PT 1+    Imaging  2/24/2025 R knee radiographs ([facility], WB AP/lateral, WB AP flexion) on my read: Joint space narrowing (medial tibiofemoral severe, lateral tibiofemoral mild, patellofemoral moderate) with osteophytes and sclerosis. Limb alignment varus.            ASSESSMENT & PLAN           Patient verbalized understanding of below A&P. All questions answered.  R knee DJD  Differential includes radicular pain from spine or referred pain from hip. H&P most consistent with knee origin.  General information  Evaluation, imaging, diagnosis, and treatment options (conservative and surgical as well as risks, benefits, recovery, and outcomes) discussed. Conservative options should be maximized and include activity modification, bracing, weight loss, PT, home exercise, local pain control (ice, heat, topicals), OTCs, and assistive devices Once exhausted, injections may provide relief. If these fail to improve pain, function, and quality of life, elective arthroplasty may be an option.  Shared decision making  Elected for CSI, which was provided  Considering R primary TKA. Given extensive conservative treatments tried, continued pain, and effect on quality of life, I agree this would be a reasonable decision. Would be an excellent arthroplasty candidate..  Follow-up: As needed. She is on a breast ca treatment that inhibits vascular healing. She is planned to be off of this by the start of July and will potentially plan for surgery shortly thereafter.  PMH, PSH,  other considerations discussed  BMI<40 but on spectrum of increased risk of surgical complications.  May affect periop anticoag choice: Breast cancer on anastrozole  Tongue cancer, treatment completed  Osteoporosis  L GT bursitis, IT band syndrome  SDD candidate  Listed in EMR but not endorsed by pt or evident on exam today: Generalized weakness  Occupation: RN (currently pain mgmt, has worked in ortho previously)  Hobbies: Not specified  PCP: MD KURT Alonso Inj/Asp: R knee on 2/24/2025 12:03 PM  Indications: pain  Details: 22 G needle, anterolateral approach  Medications: 40 mg triamcinolone acetonide 40 mg/mL; 4 mL lidocaine 10 mg/mL (1 %)  Outcome: tolerated well, no immediate complications  Procedure, treatment alternatives, risks and benefits explained, specific risks discussed. Consent was given by the patient. Immediately prior to procedure a time out was called to verify the correct patient, procedure, equipment, support staff and site/side marked as required. Patient was prepped and draped in the usual sterile fashion.

## 2025-03-27 NOTE — PROGRESS NOTES
Patient Visit Information:   Patient name: Radha Shabazz  : 1955  Date of Service: 3/31/2025    Visit Type: Follow-up      Cancer History:          Breast         AJCC Edition: 8th (AJCC), Diagnosis Date:           Cancer Staging   No matching staging information was found for the patient.       Treatment Synopsis:       Radha Shabazz is a 69 y.o. 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 The patient's breast cancer was diagnosed on 2021, and was grade 2, estrogen receptor positive at >95%/>95%, progesterone receptor positive at >95%/>95%%, and HER-2/berta negative.  After neoadjuvant endocrine therapy (letrozole), final path after right mastectomy showed a 5.2 cm mass with 3/8 macrometastatic LNs plus one LN with isolated tumor cell. Size of largest LN was 0.3cm. ([id number])    MammaPrint Index = +0.142; translating to Low Risk Luminal-Type A.      CURRENT THERAPY: Adjuvant Arimidex and Abemaciclib, Denosomab     ONCOLOGIC HISTORY:  Details of her breast cancer history are as follows:      2021: Patient underwent a screening mammogram. This showed a focal asymmetry in the central aspect of the right breast   12/15/2021: Patient underwent a right diagnostic mammogram and breast US. This revealed a mass at the 1:00, 6 cm from the nipple measuring 1.7 x 1.2 x 1.1 cm. Another mass measuring 0.5 cm was seen at 5:00, 4 cm from the nipple. Right axilla US revealed  3 unremarkable LNs. Breast tissue was extremely dense.   2021: Patient underwent US-guided core biopsies from two sites in the right breast, from 1:00, 6 cm from the nipple and from 9:00, 9 cm from the nipple. Pathology revealed above results.   MRI of the breast was attempted but patient could not tolerate it   2022: Patient was started on Letrozole 2.5 mg by mouth daily    4/15/2022: Completed a mid treatment US. This showed a stable to slight interval decrease in size of biopsy proven carcinoma.    06/13/2022: Completed right breast US. This showed interval decrease in size of biopsy proven carcinoma.   09/16/2022: Patient underwent a right mastectomy with SNLB and immediate reconstruction. Pathology revealed a 5.2 cm mass with 3/8 macrometastatic LNs plus one LN with isolated tumor cell. Size of largest LN was 0.3cm. ([id number])   09/17/2022: Evacuation of hematoma with 750 cc of estimated blood loss   10/06/2022: Revision of right reconstructed breast   12/02/2022: Received the first dose of AC   1/13/2023: Received the 4th and last dose of AC after which chemotherapy was discontinued due to side effects and hospitalization. Patient therefore did not receive any Taxane   04/07/2023: Started Exemestane   04/13/2023: Completed radiation   06/30/2023: Exemestane discontinued   06/30/2023: Started Anastrozole  July 2023: Started Abemaciclib  (getting 's supply - [facility])  3/6/2024: Started Zometa (every 24 weeks)  8/26/2024: Last dose of Zometa   10/9/2024: Her creatinine was noted to get worse. Switched from Zometa to Denosumab (every 24 weeks)  11/8/2024: Left pain X-ray for hip pain - degenerative changes    History of Present Illness: Patient ID:  Radha Shabazz is a 69 y.o. female.     Chief Complaint and/or reason for visit: Here for a follow-up     Interval History:    Radha Shabazz is a pleasant  69 y.o. 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 The patient's breast cancer was diagnosed on December 21, 2021, and was grade 2,  estrogen receptor positive at >95%/>95%, progesterone receptor positive at >95%/>95%%, and HER-2/berta negative.  MammaPrint  Index = +0.142; translating to Low Risk Luminal-Type A.   After neoadjuvant  endocrine therapy (letrozole), final path after right mastectomy showed a 5.2 cm mass with 3/8 macrometastatic LNs plus one LN with isolated tumor cell. Size of largest LN was 0.3cm. ([id number])     Here for a follow-up.     She had X-ray of hip fo rpain on 11/8/224, which showed generative disease. This is so much better and is not an issue now. She continue to exercise on the stationary bike.     Her chronic occasional fatigue is stable.      Chronic pain is stable - Her knees are the worst (right knee more than left, rating this as 7-8/10). She is getting her R knee replaced in July after being off abemaciclib.      SOB upon exertion (upon going up stairs) is getting worse. She is going to have a follow up with cardio-oncology in 2 weeks.     She is a nurse (pain management), works 2 days a week.        She denies fever, chills, Wt loss, headaches, CP, N/V, abdominal pain, constipation, diarrhea, neuropathy, extremity swelling, rashes. Appetite is good and she is drinking fine.     Review of Systems:   A 14-point review of system was completed and was negative except for what is noted in HPI.      Allergies and Intolerances:       Allergies:   Allergies   Allergen Reactions    Iodine Hives    Iodinated Contrast Media Rash, Swelling and Unknown    Sulfa (Sulfonamide Antibiotics) Rash, Swelling, Unknown and Hives             Outpatient Medication Profile:   Current Outpatient Medications on File Prior to Visit   Medication Sig Dispense Refill    anastrozole (Arimidex) 1 mg tablet Take 1 tablet (1 mg total) by mouth once daily.  Swallow whole with a drink of water. 90 tablet 3    anastrozole (Arimidex) 1 mg tablet Take 1 tablet (1 mg total) by mouth once daily.  Swallow whole with a drink of water. 90 tablet 1    escitalopram (Lexapro) 5 mg tablet Take 1 tablet (5 mg) by mouth once daily.      nitrofurantoin, macrocrystal-monohydrate, (Macrobid) 100 mg capsule Take 1 capsule (100 mg) by mouth every 12 hours. 14  capsule 0    omeprazole (PriLOSEC) 20 mg DR capsule Take 1 capsule (20 mg) by mouth once daily.      sodium hyaluronate (Durolane) 60 mg/3 mL injection Inject one syringe(60mg/3mL) into right knee one time at doctors office 3 mL 0    solifenacin (VESIcare) 10 mg tablet Take 1 tablet (10 mg) by mouth once daily. 90 tablet 3    topiramate (Topamax) 25 mg tablet Take 1 tablet (25 mg) by mouth once daily as needed.      UNABLE TO FIND Breast prosthesis for left and/or right breast      UNABLE TO FIND Mastectomy Bra for Right Mastectomy       Current Facility-Administered Medications on File Prior to Visit   Medication Dose Route Frequency Provider Last Rate Last Admin    aspirin EC tablet 81 mg  81 mg oral Once Elizabeth Trujillo, APRN-CNP           Medical History:    Past Medical History:   Diagnosis Date    Breast cancer (Multi)     Encounter for gynecological examination (general) (routine) without abnormal findings     Encounter for gynecological examination without abnormal finding    Encounter for screening mammogram for malignant neoplasm of breast     Visit for screening mammogram    Personal history of irradiation     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    Personal history of other diseases of the circulatory system     History of hypertension       Surgical History:   Past Surgical History:   Procedure Laterality Date    APPENDECTOMY  10/05/2015    Appendectomy    HYSTERECTOMY  10/05/2015    Supracervical Hysterectomy    MASTECTOMY Right 2022    OTHER SURGICAL HISTORY  06/27/2022    Tongue surgery    OTHER SURGICAL HISTORY  06/27/2022    Neck dissection modified radical    OTHER SURGICAL HISTORY  08/04/2020    Split thickness skin graft    TOTAL ABDOMINAL HYSTERECTOMY W/ BILATERAL SALPINGOOPHORECTOMY  10/05/2015    Salpingo-oophorectomy Bilateral       Social Substance History:   Social History     Tobacco Use    Smoking status: Never    Smokeless tobacco:  Never   Substance Use Topics    Alcohol use: Not on file     Social History     Substance and Sexual Activity   Drug Use Not on file       Family History:   Family History   Problem Relation Name Age of Onset    Aortic stenosis Mother      Diabetes Father      Stomach cancer Father      Colon cancer Father's Brother      Cancer Other          OBJECTIVE:     Visit Vitals  Temp:  [35.6 °C (96.1 °F)] 35.6 °C (96.1 °F)  Heart Rate:  [78] 78  Resp:  [18] 18  BP: (119)/(76) 119/76    Pain Scale: 2/10 (chronic knee pain - stable, better with steroid injection)      The ECOG performance scale today is Asymptomatic/0            Physical Exam:      Constitutional: Well developed, awake/alert/oriented  x3, no distress, alert and cooperative   Eyes: PERRL, EOMI, clear sclera   ENMT: mucous membranes moist, no apparent injury,  no lesions seen   Head/Neck: Neck supple, no apparent injury, thyroid  without mass or tenderness, No JVD, trachea midline, no bruits   Respiratory/Thorax: Patent airways, CTAB, normal  breath sounds with good chest expansion, thorax symmetric   Cardiovascular: Regular, rate and rhythm, no murmurs,  2+ equal pulses of the extremities, normal S 1and S 2   Gastrointestinal: Nondistended, soft, non-tender,  no rebound tenderness or guarding, no masses palpable, no organomegaly, +BS, no bruits   Musculoskeletal: ROM intact, no joint swelling, normal  strength   Extremities: No LE edema   Neurological: alert and oriented x3, intact senses,  motor, response and reflexes, normal strength   Breast: s/p Right mastectomy with implant reconstruction with well healed surgical incision. No palpable mass in the left breast. No palpable axillary or supraclavicular lymphadenopathies bilaterally. No swelling of either upper extremity which had free range of motion.    Lymphatic: No significant lymphadenopathy   Psychological: Appropriate mood and behavior   Skin: Warm and dry, no lesions, no rashes       LAB  "RESULTS    Lab Results   Component Value Date    WBC 2.4 (L) 03/31/2025    HGB 12.5 03/31/2025    HCT 35.2 (L) 03/31/2025    MCV 99 03/31/2025     03/31/2025     Lab Results   Component Value Date    NEUTROABS 1.33 03/31/2025       No results for input(s): \"CREATININE\", \"BUN\", \"NA\", \"K\", \"CL\", \"CO2\" in the last 72 hours.      No components found for: \"CALCGFR\"  Lab Results   Component Value Date    GLUCOSE 81 02/10/2025     Lab Results   Component Value Date     02/10/2025    K 4.2 02/10/2025     02/10/2025    CO2 23 02/10/2025    BUN 24 (H) 02/10/2025    CREATININE 1.14 (H) 02/10/2025    ALT 15 02/10/2025    AST 18 02/10/2025    ALKPHOS 54 02/10/2025    BILITOT 0.5 02/10/2025     No results found for: \"FOLATE\"  No results found for: \"HCRSKZAK75\"  Lab Results   Component Value Date    TSH 0.77 02/15/2023        Imaging:      No images are attached to the encounter.        Assessment and Plan:   John Shabazz is a 69 y.o. 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 The patient's breast cancer was diagnosed on December 21, 2021, and was grade 2, estrogen receptor positive at >95%/>95%, progesterone receptor positive at >95%/>95%%, and HER-2/berta negative.  After neoadjuvant endocrine therapy (letrozole), final path after right mastectomy showed a 5.2 cm mass with 3/8 macrometastatic LNs plus one LN with isolated tumor cell. Size of largest LN was 0.3cm. ([id number])    MammaPrint Index = +0.142; translating to Low Risk Luminal-Type A.     She completed 4 cycles of AC on 1/13/2023 after which chemotherapy was discontinued due to side effects and hospitalization. Patient therefore did not receive any Taxane      Currently on Arimidex and Abemaciclib.   Her abemaciclib was started as 100mg BID, which caused her counts to drop. Her counts are " stable at the current dose of 50 mg BID.      Her ANC today is 1.29.   CMP showed stable creatinine 1.26   (baseline 1.15, up to 1.54 on 10/2/2024).   AST/ALT - wnl       Last DEXA was 06/24/2024 - Normal     She has a port - she would like to keep it since she is a hard stick    Note - She has right breast capsulitis (tissue expander in). Can cause 3/10 pain. Reconstructive surgery (BETTY) not planned while she is on abemaciclib.     Her WBC has been low since 12/11/2023 (the oldest data points visible in EPIC). I think this is due to abema (started in July of 2023).      She is to complete abema in July of 2025. Will continue to watch closely. The count is expected to recover after completing the abema therapy. Her ANC has always been good. Low WBC is due to lymphopenia.    Most recent labs (3/31/2025) - Results pending, to be reviewed.        Plan  Continue Arimidex 1 mg by mouth daily (To complete 5 years in 6/30/2028. If 10 years, 6/30/2033)    Continue symptom management for joint pain. She is already on third AI. She states she can tolerate. Agreed to do at least 5 years of AI.    Continue Abemaciclib to 50mg by BID ([facility] providing the med)  - to complete July 2025.     She is to have knee surgery in July: She can schedule the surgery at least 2-3 days after the last dose of abemaciclib.  Denosomab scheduled for today (3/31/2025): switched from Zometa to denosumab due to rising creatinine  Next denosomab on 9/8/2025  CBC with diff and CMP every 3 months (order placed for June today)   Left mammogram in 12/2024, showing BIRAD-3 (probably benign). Should be repeated (in 6 months) - ordered today (to be done in June)   DEXA on 6/24/2024 was normal. She is already receiving bisphosphonate No need to repeat DEXA from medical oncology perspective.   SOB upon exertion (upon going up stairs) is getting worse. She is going to have a follow up with cardio-oncology in 2 weeks. Next ECHO and cardiology appt are scheduled  for 4/17/2025  RTC in 3 months with lab and evaluation for continuing Abemaciclib and Arimidex, denosomab  RTC on 6/30/2025      Anna Olson MD, PhD   Hematology/Oncology  [facility]

## 2025-03-31 ENCOUNTER — INFUSION (OUTPATIENT)
Dept: HEMATOLOGY/ONCOLOGY | Facility: CLINIC | Age: 70
End: 2025-03-31
Payer: COMMERCIAL

## 2025-03-31 ENCOUNTER — ONCOLOGY MEDICATION OUTREACH (OUTPATIENT)
Dept: HEMATOLOGY/ONCOLOGY | Facility: CLINIC | Age: 70
End: 2025-03-31

## 2025-03-31 ENCOUNTER — OFFICE VISIT (OUTPATIENT)
Dept: HEMATOLOGY/ONCOLOGY | Facility: CLINIC | Age: 70
End: 2025-03-31
Payer: COMMERCIAL

## 2025-03-31 ENCOUNTER — SOCIAL WORK (OUTPATIENT)
Dept: CASE MANAGEMENT | Facility: HOSPITAL | Age: 70
End: 2025-03-31

## 2025-03-31 ENCOUNTER — LAB (OUTPATIENT)
Dept: LAB | Facility: CLINIC | Age: 70
End: 2025-03-31
Payer: COMMERCIAL

## 2025-03-31 VITALS
DIASTOLIC BLOOD PRESSURE: 76 MMHG | SYSTOLIC BLOOD PRESSURE: 119 MMHG | HEART RATE: 78 BPM | OXYGEN SATURATION: 96 % | TEMPERATURE: 96.1 F | BODY MASS INDEX: 30.96 KG/M2 | RESPIRATION RATE: 18 BRPM | WEIGHT: 191.8 LBS

## 2025-03-31 DIAGNOSIS — M85.80 OSTEOPENIA, UNSPECIFIED LOCATION: ICD-10-CM

## 2025-03-31 DIAGNOSIS — C50.211 MALIGNANT NEOPLASM OF UPPER-INNER QUADRANT OF RIGHT BREAST IN FEMALE, ESTROGEN RECEPTOR POSITIVE: Primary | ICD-10-CM

## 2025-03-31 DIAGNOSIS — Z17.0 MALIGNANT NEOPLASM OF UPPER-INNER QUADRANT OF RIGHT BREAST IN FEMALE, ESTROGEN RECEPTOR POSITIVE: ICD-10-CM

## 2025-03-31 DIAGNOSIS — C50.211 MALIGNANT NEOPLASM OF UPPER-INNER QUADRANT OF RIGHT BREAST IN FEMALE, ESTROGEN RECEPTOR POSITIVE: ICD-10-CM

## 2025-03-31 DIAGNOSIS — Z17.0 MALIGNANT NEOPLASM OF UPPER-INNER QUADRANT OF RIGHT BREAST IN FEMALE, ESTROGEN RECEPTOR POSITIVE: Primary | ICD-10-CM

## 2025-03-31 LAB
25(OH)D3 SERPL-MCNC: 52 NG/ML (ref 30–100)
ALBUMIN SERPL BCP-MCNC: 4.3 G/DL (ref 3.4–5)
ALP SERPL-CCNC: 61 U/L (ref 33–136)
ALT SERPL W P-5'-P-CCNC: 18 U/L (ref 7–45)
ANION GAP SERPL CALC-SCNC: 13 MMOL/L (ref 10–20)
ANION GAP SERPL CALC-SCNC: 13 MMOL/L (ref 10–20)
AST SERPL W P-5'-P-CCNC: 18 U/L (ref 9–39)
BASOPHILS # BLD AUTO: 0.06 X10*3/UL (ref 0–0.1)
BASOPHILS NFR BLD AUTO: 2.5 %
BILIRUB SERPL-MCNC: 0.5 MG/DL (ref 0–1.2)
BUN SERPL-MCNC: 27 MG/DL (ref 6–23)
BUN SERPL-MCNC: 31 MG/DL (ref 6–23)
CA-I BLD-SCNC: 1.34 MMOL/L (ref 1.1–1.33)
CALCIUM SERPL-MCNC: 10.8 MG/DL (ref 8.6–10.6)
CHLORIDE SERPL-SCNC: 105 MMOL/L (ref 98–107)
CHLORIDE SERPL-SCNC: 108 MMOL/L (ref 98–107)
CO2 SERPL-SCNC: 20 MMOL/L (ref 21–32)
CO2 SERPL-SCNC: 24 MMOL/L (ref 21–32)
CREAT SERPL-MCNC: 1.32 MG/DL (ref 0.5–1.05)
CREAT SERPL-MCNC: 1.5 MG/DL (ref 0.6–1.3)
EGFRCR SERPLBLD CKD-EPI 2021: 44 ML/MIN/1.73M*2
EOSINOPHIL # BLD AUTO: 0.1 X10*3/UL (ref 0–0.7)
EOSINOPHIL NFR BLD AUTO: 4.1 %
ERYTHROCYTE [DISTWIDTH] IN BLOOD BY AUTOMATED COUNT: 12.7 % (ref 11.5–14.5)
GFR SERPL CREATININE-BSD FRML MDRD: 38 ML/MIN/1.73M*2
GLUCOSE SERPL-MCNC: 107 MG/DL (ref 74–99)
GLUCOSE SERPL-MCNC: 108 MG/DL (ref 74–99)
HCT VFR BLD AUTO: 35.2 % (ref 36–46)
HGB BLD-MCNC: 12.5 G/DL (ref 12–16)
IMM GRANULOCYTES # BLD AUTO: 0.01 X10*3/UL (ref 0–0.7)
IMM GRANULOCYTES NFR BLD AUTO: 0.4 % (ref 0–0.9)
LYMPHOCYTES # BLD AUTO: 0.62 X10*3/UL (ref 1.2–4.8)
LYMPHOCYTES NFR BLD AUTO: 25.7 %
MCH RBC QN AUTO: 35.1 PG (ref 26–34)
MCHC RBC AUTO-ENTMCNC: 35.5 G/DL (ref 32–36)
MCV RBC AUTO: 99 FL (ref 80–100)
MONOCYTES # BLD AUTO: 0.29 X10*3/UL (ref 0.1–1)
MONOCYTES NFR BLD AUTO: 12 %
NEUTROPHILS # BLD AUTO: 1.33 X10*3/UL (ref 1.2–7.7)
NEUTROPHILS NFR BLD AUTO: 55.3 %
NRBC BLD-RTO: ABNORMAL /100{WBCS}
PLATELET # BLD AUTO: 172 X10*3/UL (ref 150–450)
POTASSIUM SERPL-SCNC: 4.3 MMOL/L (ref 3.5–5.3)
POTASSIUM SERPL-SCNC: 4.4 MMOL/L (ref 3.5–5.3)
PROT SERPL-MCNC: 6.7 G/DL (ref 6.4–8.2)
RBC # BLD AUTO: 3.56 X10*6/UL (ref 4–5.2)
SODIUM SERPL-SCNC: 137 MMOL/L (ref 136–145)
SODIUM SERPL-SCNC: 138 MMOL/L (ref 136–145)
WBC # BLD AUTO: 2.4 X10*3/UL (ref 4.4–11.3)

## 2025-03-31 PROCEDURE — 82306 VITAMIN D 25 HYDROXY: CPT

## 2025-03-31 PROCEDURE — 85025 COMPLETE CBC W/AUTO DIFF WBC: CPT

## 2025-03-31 PROCEDURE — 99214 OFFICE O/P EST MOD 30 MIN: CPT | Performed by: INTERNAL MEDICINE

## 2025-03-31 PROCEDURE — 3078F DIAST BP <80 MM HG: CPT | Performed by: INTERNAL MEDICINE

## 2025-03-31 PROCEDURE — 1126F AMNT PAIN NOTED NONE PRSNT: CPT | Performed by: INTERNAL MEDICINE

## 2025-03-31 PROCEDURE — 99214 OFFICE O/P EST MOD 30 MIN: CPT | Mod: 25 | Performed by: INTERNAL MEDICINE

## 2025-03-31 PROCEDURE — 1036F TOBACCO NON-USER: CPT | Performed by: INTERNAL MEDICINE

## 2025-03-31 PROCEDURE — 96372 THER/PROPH/DIAG INJ SC/IM: CPT

## 2025-03-31 PROCEDURE — 2500000004 HC RX 250 GENERAL PHARMACY W/ HCPCS (ALT 636 FOR OP/ED): Mod: JZ,TB | Performed by: NURSE PRACTITIONER

## 2025-03-31 PROCEDURE — 3074F SYST BP LT 130 MM HG: CPT | Performed by: INTERNAL MEDICINE

## 2025-03-31 PROCEDURE — 80047 BASIC METABLC PNL IONIZED CA: CPT

## 2025-03-31 PROCEDURE — 80053 COMPREHEN METABOLIC PANEL: CPT

## 2025-03-31 PROCEDURE — 1159F MED LIST DOCD IN RCRD: CPT | Performed by: INTERNAL MEDICINE

## 2025-03-31 RX ORDER — DIPHENHYDRAMINE HYDROCHLORIDE 50 MG/ML
50 INJECTION, SOLUTION INTRAMUSCULAR; INTRAVENOUS AS NEEDED
OUTPATIENT
Start: 2025-08-09

## 2025-03-31 RX ORDER — DIPHENHYDRAMINE HYDROCHLORIDE 50 MG/ML
50 INJECTION, SOLUTION INTRAMUSCULAR; INTRAVENOUS AS NEEDED
Status: DISCONTINUED | OUTPATIENT
Start: 2025-03-31 | End: 2025-03-31 | Stop reason: HOSPADM

## 2025-03-31 RX ORDER — EPINEPHRINE 0.3 MG/.3ML
0.3 INJECTION SUBCUTANEOUS EVERY 5 MIN PRN
Status: DISCONTINUED | OUTPATIENT
Start: 2025-03-31 | End: 2025-03-31 | Stop reason: HOSPADM

## 2025-03-31 RX ORDER — ALBUTEROL SULFATE 0.83 MG/ML
3 SOLUTION RESPIRATORY (INHALATION) AS NEEDED
Status: DISCONTINUED | OUTPATIENT
Start: 2025-03-31 | End: 2025-03-31 | Stop reason: HOSPADM

## 2025-03-31 RX ORDER — FAMOTIDINE 10 MG/ML
20 INJECTION, SOLUTION INTRAVENOUS ONCE AS NEEDED
OUTPATIENT
Start: 2025-08-09

## 2025-03-31 RX ORDER — EPINEPHRINE 0.3 MG/.3ML
0.3 INJECTION SUBCUTANEOUS EVERY 5 MIN PRN
OUTPATIENT
Start: 2025-08-09

## 2025-03-31 RX ORDER — ALBUTEROL SULFATE 0.83 MG/ML
3 SOLUTION RESPIRATORY (INHALATION) AS NEEDED
OUTPATIENT
Start: 2025-08-09

## 2025-03-31 RX ORDER — FAMOTIDINE 10 MG/ML
20 INJECTION, SOLUTION INTRAVENOUS ONCE AS NEEDED
Status: DISCONTINUED | OUTPATIENT
Start: 2025-03-31 | End: 2025-03-31 | Stop reason: HOSPADM

## 2025-03-31 RX ADMIN — DENOSUMAB 60 MG: 60 INJECTION SUBCUTANEOUS at 11:33

## 2025-03-31 ASSESSMENT — PAIN SCALES - GENERAL: PAINLEVEL_OUTOF10: 0-NO PAIN

## 2025-03-31 NOTE — PATIENT INSTRUCTIONS
Please continue anastrozole and verzenio.   Please get prolia today.   Schedule next prolia on 9/18. Please get labs 1-3 days before.  Schedule follow up with Dr. Olson and mammogram on 6/30.  Please call [phone] (option 5, then option 2) with symptoms, questions or concerns.

## 2025-03-31 NOTE — PROGRESS NOTES
SW met with patient today at [facility]. She reported she appreciated SW help with the Verzenio [facility] C[employer] application. Patient reported she will no longer be taking it as of July. SW offered any assistance needed. Patient was in good spirits.

## 2025-04-01 PROBLEM — M17.11 ARTHRITIS OF RIGHT KNEE: Status: ACTIVE | Noted: 2025-07-30

## 2025-04-10 ENCOUNTER — TELEPHONE (OUTPATIENT)
Dept: ADMISSION | Facility: HOSPITAL | Age: 70
End: 2025-04-10
Payer: COMMERCIAL

## 2025-04-10 DIAGNOSIS — C50.211 MALIGNANT NEOPLASM OF UPPER-INNER QUADRANT OF RIGHT BREAST IN FEMALE, ESTROGEN RECEPTOR POSITIVE: Primary | ICD-10-CM

## 2025-04-10 DIAGNOSIS — Z17.0 MALIGNANT NEOPLASM OF UPPER-INNER QUADRANT OF RIGHT BREAST IN FEMALE, ESTROGEN RECEPTOR POSITIVE: Primary | ICD-10-CM

## 2025-04-10 NOTE — TELEPHONE ENCOUNTER
Radha is past due for a port flush (last flushed 2/10); she is asking if [facility] could get her in one day next week (any day other than 4/17).

## 2025-04-16 ENCOUNTER — INFUSION (OUTPATIENT)
Dept: HEMATOLOGY/ONCOLOGY | Facility: CLINIC | Age: 70
End: 2025-04-16
Payer: COMMERCIAL

## 2025-04-16 VITALS
RESPIRATION RATE: 16 BRPM | WEIGHT: 199.74 LBS | SYSTOLIC BLOOD PRESSURE: 123 MMHG | DIASTOLIC BLOOD PRESSURE: 81 MMHG | TEMPERATURE: 96.6 F | HEART RATE: 87 BPM | BODY MASS INDEX: 32.24 KG/M2

## 2025-04-16 DIAGNOSIS — C50.211 MALIGNANT NEOPLASM OF UPPER-INNER QUADRANT OF RIGHT BREAST IN FEMALE, ESTROGEN RECEPTOR POSITIVE: ICD-10-CM

## 2025-04-16 DIAGNOSIS — Z17.0 MALIGNANT NEOPLASM OF UPPER-INNER QUADRANT OF RIGHT BREAST IN FEMALE, ESTROGEN RECEPTOR POSITIVE: ICD-10-CM

## 2025-04-16 PROCEDURE — 96523 IRRIG DRUG DELIVERY DEVICE: CPT

## 2025-04-16 PROCEDURE — 2500000004 HC RX 250 GENERAL PHARMACY W/ HCPCS (ALT 636 FOR OP/ED): Mod: JZ | Performed by: INTERNAL MEDICINE

## 2025-04-16 RX ORDER — HEPARIN 100 UNIT/ML
500 SYRINGE INTRAVENOUS AS NEEDED
Status: DISCONTINUED | OUTPATIENT
Start: 2025-04-16 | End: 2025-04-16 | Stop reason: HOSPADM

## 2025-04-16 RX ORDER — HEPARIN SODIUM,PORCINE/PF 10 UNIT/ML
50 SYRINGE (ML) INTRAVENOUS AS NEEDED
OUTPATIENT
Start: 2025-04-16

## 2025-04-16 RX ORDER — HEPARIN 100 UNIT/ML
500 SYRINGE INTRAVENOUS AS NEEDED
OUTPATIENT
Start: 2025-04-16

## 2025-04-16 RX ORDER — HEPARIN SODIUM,PORCINE/PF 10 UNIT/ML
50 SYRINGE (ML) INTRAVENOUS AS NEEDED
Status: DISCONTINUED | OUTPATIENT
Start: 2025-04-16 | End: 2025-04-16 | Stop reason: HOSPADM

## 2025-04-16 RX ADMIN — HEPARIN 500 UNITS: 100 SYRINGE at 10:23

## 2025-04-16 ASSESSMENT — PAIN SCALES - GENERAL: PAINLEVEL_OUTOF10: 0-NO PAIN

## 2025-04-16 NOTE — PROGRESS NOTES
CARDIO-ONCOLOGY PROGRESS NOTE  Name: Radha Shabazz   MRN: [id number]  : 1955  Date of Service: 25  Medical Oncologist: MD Anna Quinonez MD    CHIEF COMPLAINT:  Radha Shabazz is a 69 y.o. female who presents to clinic for continued monitoring s/p cardiotoxic chemotherapy. New patient visit for me today    HPI:  Oncology History:  -21:  Diagnosed with clinical stage IIA ER/LA+, HER2- R ILC.  Started neoadj AR  -22:  R mastectomy.  Final path [id number]   --22:  4 cycles adjuvant AC.  Further chemo with Taxol held d/t side effects  -2023-present abemaciclib/anastrozole. Planned completion of abemaciclib 2025    Cardiology History:  -22:  Baseline echo with hyperdynamic LVEF 70-75%, grade 1 diastolic dysfunction, no major valvular pathology  -10/17/24:  Onco echo with LVEF 62%, grade 1 diastolic dysfunction, mild AR.  Multiple previous serial echocardiograms show preserved biventricular function    INTERVAL HISTORY:  ***    ROS:  -Negative except as described in interval history    PROBLEM LIST:  Problem List[1]     PAST SURGICAL HISTORY:  Surgical History[2]    MEDICATIONS:  Medications Ordered Prior to Encounter[3]    ALLERGIES:  Allergies[4]    SOCIAL HISTORY:  Social History[5]    FAMILY HISTORY:  Family History[6]    VITAL SIGNS:  /79   Pulse 89   Temp 35.6 °C (96.1 °F) (Temporal)   Resp 16   Wt 90.4 kg (199 lb 6.4 oz)   SpO2 99%   BMI 32.18 kg/m²    Body mass index is 32.18 kg/m².     LABS:  Lab Results   Component Value Date    GLUCOSE 107 (H) 2025    CALCIUM 10.8 (H) 2025     2025    K 4.4 2025    CO2 24 2025     2025    BUN 31 (H) 2025    CREATININE 1.32 (H) 2025     Lab Results   Component Value Date    WBC 2.4 (L) 2025    HGB 12.5 2025    HCT 35.2 (L) 2025    MCV 99 2025     2025     Lab Results   Component Value Date    CHOL 259 (H)  "10/18/2024     Lab Results   Component Value Date    HDL 58.8 10/18/2024     Lab Results   Component Value Date    LDLCALC 160 (H) 10/18/2024     Lab Results   Component Value Date    TRIG 199 (H) 10/18/2024     No components found for: \"CHOLHDL\"     ECHO:    EKG:  No results found. However, due to the size of the patient record, not all encounters were searched. Please check Results Review for a complete set of results.     PHYSICAL EXAM:  General: Well-developed, well-nourished, NAD  Skin: No rash, jaundice, or lesions  HEENT: Normocephalic, atraumatic.  Sclerae anicteric. MMMI   Cardiovascular:  HRRR.  No murmur, gallop, or rub.  No cyanosis or clubbing.  No JVD.  No edema  Lungs:  CTAB  Musculoskeletal: Full ROM grossly intact  Neurological: A&O x 4  Psychiatric: Mood and affect appropriate    ASSESSMENT/PLAN:  #S/p cardiotoxic chemotherapy:  -Serial echocardiograms, including the prior 6 months ago, show preserved biventricular function.  Patient has repeat echocardiogram after today's clinic visit.  If normal, can check again in 1 year and annually through 2027  -Patient has no clinical s/sxs CHF    #HLD:  - and apolipoprotein B 124 6 months ago.  Patient on on statin    --RTC in 1 year    ***spent on face-to-face and non-face-to-face patient care      Denise Pace, APRN-CNP           [1]   Patient Active Problem List  Diagnosis    SCC (squamous cell carcinoma)    Abnormal finding on breast imaging    Anisometropia    Benign essential hypertension    Blepharitis of both eyes    Breast cancer of upper-inner quadrant of right female breast    Breast mass, right    Cortical age-related cataract of both eyes    Fluid collection at surgical site    Generalized weakness    Fatigue    Mixed incontinence    Urinary incontinence    Myopia of both eyes with astigmatism and presbyopia    Nephrolithiasis    Nuclear sclerosis of both eyes    Obese    Osteoarthritis of right knee    Osteoporosis    Primary " localized osteoarthrosis of right lower leg    Recurrent UTI    Shortness of breath at rest    Thyroid nodule    Trochanteric bursitis of left hip    BRCA negative    Malignant neoplasm of central portion of right breast in female, estrogen receptor positive    Menopause    Greater trochanteric bursitis of left hip    It band syndrome, left    Arthritis of right knee   [2]   Past Surgical History:  Procedure Laterality Date    APPENDECTOMY  10/05/2015    Appendectomy    HYSTERECTOMY  10/05/2015    Supracervical Hysterectomy    MASTECTOMY Right 2022    OTHER SURGICAL HISTORY  06/27/2022    Tongue surgery    OTHER SURGICAL HISTORY  06/27/2022    Neck dissection modified radical    OTHER SURGICAL HISTORY  08/04/2020    Split thickness skin graft    TOTAL ABDOMINAL HYSTERECTOMY W/ BILATERAL SALPINGOOPHORECTOMY  10/05/2015    Salpingo-oophorectomy Bilateral   [3]   Current Outpatient Medications on File Prior to Visit   Medication Sig Dispense Refill    anastrozole (Arimidex) 1 mg tablet Take 1 tablet (1 mg total) by mouth once daily.  Swallow whole with a drink of water. 90 tablet 3    anastrozole (Arimidex) 1 mg tablet Take 1 tablet (1 mg total) by mouth once daily.  Swallow whole with a drink of water. 90 tablet 1    escitalopram (Lexapro) 5 mg tablet Take 1 tablet (5 mg) by mouth once daily.      nitrofurantoin, macrocrystal-monohydrate, (Macrobid) 100 mg capsule Take 1 capsule (100 mg) by mouth every 12 hours. 14 capsule 0    omeprazole (PriLOSEC) 20 mg DR capsule Take 1 capsule (20 mg) by mouth once daily.      sodium hyaluronate (Durolane) 60 mg/3 mL injection Inject one syringe(60mg/3mL) into right knee one time at doctors office 3 mL 0    topiramate (Topamax) 25 mg tablet Take 1 tablet (25 mg) by mouth once daily as needed.      UNABLE TO FIND Breast prosthesis for left and/or right breast      UNABLE TO FIND Mastectomy Bra for Right Mastectomy       Current Facility-Administered Medications on File Prior to  Visit   Medication Dose Route Frequency Provider Last Rate Last Admin    aspirin EC tablet 81 mg  81 mg oral Once Elizabeth Trujillo, APRN-CNP        [DISCONTINUED] alteplase (Cathflo Activase) injection 2 mg  2 mg intra-catheter PRN Anna Olson MD        [DISCONTINUED] heparin flush 10 unit/mL syringe 50 Units  50 Units intra-catheter PRN Anna Olson MD        [DISCONTINUED] heparin flush 100 unit/mL syringe 500 Units  500 Units intra-catheter PRN Anna Olson MD   500 Units at 04/16/25 1023   [4]   Allergies  Allergen Reactions    Iodine Hives    Iodinated Contrast Media Rash, Swelling and Unknown    Sulfa (Sulfonamide Antibiotics) Rash, Swelling, Unknown and Hives   [5]   Social History  Tobacco Use    Smoking status: Never    Smokeless tobacco: Never   [6]   Family History  Problem Relation Name Age of Onset    Aortic stenosis Mother      Diabetes Father      Stomach cancer Father      Colon cancer Father's Brother      Cancer Other

## 2025-04-17 ENCOUNTER — OFFICE VISIT (OUTPATIENT)
Dept: CARDIOLOGY | Facility: CLINIC | Age: 70
End: 2025-04-17
Payer: COMMERCIAL

## 2025-04-17 ENCOUNTER — APPOINTMENT (OUTPATIENT)
Dept: CARDIOLOGY | Facility: CLINIC | Age: 70
End: 2025-04-17
Payer: COMMERCIAL

## 2025-04-17 ENCOUNTER — HOSPITAL ENCOUNTER (OUTPATIENT)
Dept: CARDIOLOGY | Facility: HOSPITAL | Age: 70
Discharge: HOME | End: 2025-04-17
Payer: COMMERCIAL

## 2025-04-17 VITALS
RESPIRATION RATE: 16 BRPM | HEART RATE: 89 BPM | TEMPERATURE: 96.1 F | DIASTOLIC BLOOD PRESSURE: 79 MMHG | SYSTOLIC BLOOD PRESSURE: 115 MMHG | BODY MASS INDEX: 32.18 KG/M2 | WEIGHT: 199.4 LBS | OXYGEN SATURATION: 99 %

## 2025-04-17 DIAGNOSIS — Z51.81 ENCOUNTER FOR MONITORING CARDIOTOXIC DRUG THERAPY: ICD-10-CM

## 2025-04-17 DIAGNOSIS — I51.89 DIASTOLIC DYSFUNCTION WITHOUT HEART FAILURE: ICD-10-CM

## 2025-04-17 DIAGNOSIS — E78.5 HYPERLIPIDEMIA, UNSPECIFIED HYPERLIPIDEMIA TYPE: Primary | ICD-10-CM

## 2025-04-17 DIAGNOSIS — Z79.899 ENCOUNTER FOR MONITORING CARDIOTOXIC DRUG THERAPY: ICD-10-CM

## 2025-04-17 DIAGNOSIS — Z51.81 ENCOUNTER FOR THERAPEUTIC DRUG LEVEL MONITORING: ICD-10-CM

## 2025-04-17 LAB
AORTIC VALVE MEAN GRADIENT: 3 MMHG
AORTIC VALVE PEAK VELOCITY: 1.19 M/S
AV PEAK GRADIENT: 6 MMHG
EJECTION FRACTION APICAL 4 CHAMBER: 59.8
EJECTION FRACTION: 55 %
GLOBAL LONGITUDINAL STRAIN: 19 %
LEFT VENTRICLE INTERNAL DIMENSION DIASTOLE: 2.88 CM (ref 3.5–6)
MITRAL VALVE E/A RATIO: 0.33
RIGHT VENTRICLE PEAK SYSTOLIC PRESSURE: 18.9 MMHG

## 2025-04-17 PROCEDURE — 93321 DOPPLER ECHO F-UP/LMTD STD: CPT | Performed by: INTERNAL MEDICINE

## 2025-04-17 PROCEDURE — 93325 DOPPLER ECHO COLOR FLOW MAPG: CPT | Performed by: INTERNAL MEDICINE

## 2025-04-17 PROCEDURE — 93356 MYOCRD STRAIN IMG SPCKL TRCK: CPT | Performed by: INTERNAL MEDICINE

## 2025-04-17 PROCEDURE — 99213 OFFICE O/P EST LOW 20 MIN: CPT | Performed by: NURSE PRACTITIONER

## 2025-04-17 PROCEDURE — 1159F MED LIST DOCD IN RCRD: CPT | Performed by: NURSE PRACTITIONER

## 2025-04-17 PROCEDURE — G2211 COMPLEX E/M VISIT ADD ON: HCPCS | Performed by: NURSE PRACTITIONER

## 2025-04-17 PROCEDURE — 3078F DIAST BP <80 MM HG: CPT | Performed by: NURSE PRACTITIONER

## 2025-04-17 PROCEDURE — 93308 TTE F-UP OR LMTD: CPT | Performed by: INTERNAL MEDICINE

## 2025-04-17 PROCEDURE — 99203 OFFICE O/P NEW LOW 30 MIN: CPT | Performed by: NURSE PRACTITIONER

## 2025-04-17 PROCEDURE — 76376 3D RENDER W/INTRP POSTPROCES: CPT | Performed by: INTERNAL MEDICINE

## 2025-04-17 PROCEDURE — 1126F AMNT PAIN NOTED NONE PRSNT: CPT | Performed by: NURSE PRACTITIONER

## 2025-04-17 PROCEDURE — 76376 3D RENDER W/INTRP POSTPROCES: CPT

## 2025-04-17 PROCEDURE — 3074F SYST BP LT 130 MM HG: CPT | Performed by: NURSE PRACTITIONER

## 2025-04-17 RX ORDER — ROSUVASTATIN CALCIUM 20 MG/1
20 TABLET, COATED ORAL DAILY
Qty: 90 TABLET | Refills: 3 | Status: SHIPPED | OUTPATIENT
Start: 2025-04-17 | End: 2026-04-17

## 2025-04-17 ASSESSMENT — ENCOUNTER SYMPTOMS
DEPRESSION: 0
OCCASIONAL FEELINGS OF UNSTEADINESS: 0
LOSS OF SENSATION IN FEET: 0

## 2025-04-17 ASSESSMENT — PAIN SCALES - GENERAL: PAINLEVEL_OUTOF10: 0-NO PAIN

## 2025-04-17 ASSESSMENT — PATIENT HEALTH QUESTIONNAIRE - PHQ9
2. FEELING DOWN, DEPRESSED OR HOPELESS: NOT AT ALL
1. LITTLE INTEREST OR PLEASURE IN DOING THINGS: NOT AT ALL
SUM OF ALL RESPONSES TO PHQ9 QUESTIONS 1 & 2: 0

## 2025-04-17 NOTE — PATIENT INSTRUCTIONS
It was a pleasure seeing you today.  Here is our plan:  --Start daily Crestor (rosuvastatin)  --Echocardiogram in 1 year  --Return visit in 1 year

## 2025-04-21 ENCOUNTER — HOSPITAL ENCOUNTER (OUTPATIENT)
Dept: RADIOLOGY | Facility: HOSPITAL | Age: 70
Discharge: HOME | End: 2025-04-21
Payer: COMMERCIAL

## 2025-04-21 ENCOUNTER — TELEPHONE (OUTPATIENT)
Dept: OBSTETRICS AND GYNECOLOGY | Facility: CLINIC | Age: 70
End: 2025-04-21
Payer: COMMERCIAL

## 2025-04-21 DIAGNOSIS — N20.0 NEPHROLITHIASIS: ICD-10-CM

## 2025-04-21 DIAGNOSIS — N32.81 OVERACTIVE BLADDER: Primary | ICD-10-CM

## 2025-04-21 PROCEDURE — 76770 US EXAM ABDO BACK WALL COMP: CPT | Performed by: RADIOLOGY

## 2025-04-21 PROCEDURE — 76770 US EXAM ABDO BACK WALL COMP: CPT

## 2025-04-21 RX ORDER — SOLIFENACIN SUCCINATE 10 MG/1
10 TABLET, FILM COATED ORAL DAILY
Qty: 30 TABLET | Refills: 2 | Status: SHIPPED | OUTPATIENT
Start: 2025-04-21

## 2025-04-24 ENCOUNTER — OFFICE VISIT (OUTPATIENT)
Dept: PAIN MEDICINE | Facility: CLINIC | Age: 70
End: 2025-04-24
Payer: COMMERCIAL

## 2025-04-24 VITALS
OXYGEN SATURATION: 98 % | DIASTOLIC BLOOD PRESSURE: 80 MMHG | TEMPERATURE: 96.2 F | RESPIRATION RATE: 18 BRPM | HEART RATE: 79 BPM | SYSTOLIC BLOOD PRESSURE: 126 MMHG

## 2025-04-24 DIAGNOSIS — G89.29 CHRONIC PAIN OF RIGHT KNEE: Primary | ICD-10-CM

## 2025-04-24 DIAGNOSIS — M25.561 CHRONIC PAIN OF RIGHT KNEE: Primary | ICD-10-CM

## 2025-04-24 PROCEDURE — 99203 OFFICE O/P NEW LOW 30 MIN: CPT | Performed by: PAIN MEDICINE

## 2025-04-24 PROCEDURE — 3074F SYST BP LT 130 MM HG: CPT | Performed by: PAIN MEDICINE

## 2025-04-24 PROCEDURE — 3079F DIAST BP 80-89 MM HG: CPT | Performed by: PAIN MEDICINE

## 2025-04-24 PROCEDURE — 1159F MED LIST DOCD IN RCRD: CPT | Performed by: PAIN MEDICINE

## 2025-04-24 PROCEDURE — 1125F AMNT PAIN NOTED PAIN PRSNT: CPT | Performed by: PAIN MEDICINE

## 2025-04-24 PROCEDURE — 99213 OFFICE O/P EST LOW 20 MIN: CPT | Performed by: PAIN MEDICINE

## 2025-04-24 PROCEDURE — G2211 COMPLEX E/M VISIT ADD ON: HCPCS | Performed by: PAIN MEDICINE

## 2025-04-24 ASSESSMENT — COLUMBIA-SUICIDE SEVERITY RATING SCALE - C-SSRS: 1. IN THE PAST MONTH, HAVE YOU WISHED YOU WERE DEAD OR WISHED YOU COULD GO TO SLEEP AND NOT WAKE UP?: NO

## 2025-04-24 ASSESSMENT — PAIN SCALES - GENERAL: PAINLEVEL_OUTOF10: 7

## 2025-04-24 NOTE — H&P (VIEW-ONLY)
History Of Present Illness  Radha Shabazz is a 69 y.o. female presenting with Right knee medial aspect this has been for the past 4 years   Recent xrays 6 weeks ago showed advanced osteoarthritis of the right knee she has been regularly on the ibuprofen 600 mg at least twice per day rating her pain currently at a level of 7 out of 10 limiting her activity pain is aggravated by standing and walking she is wearing a brace with no significant improvement she tried physical therapy exercises at home that did not provide her with any significant improvement she did receive in the past a steroid and local anesthetic injection in the joint that provided her with great pain relief her last intervention was performed in February 2025 describing the pain as a throbbing sharp sensation     Past Medical History  Medical History[1]  Surgical History  Surgical History[2]  Social History  She reports that she has never smoked. She has never used smokeless tobacco. No history on file for alcohol use and drug use.    Family History  Family History[3]     Allergies  Allergies[4]  Review of Systems   12 Systems have been reviewed as follows.   Constitutional: Fever, weight gain, weight loss, appetite change, night sweats, fatigue, chills.  Eyes : blurry, double vision, vision, loss, tearing, redness, pain, sensitivity to light, glaucoma.  Ears, nose, mouth, and throat: Hearing loss, ringing in the ears, ear pain, nasal congestion, nasal drainage, nosebleeds, mouth, throat, irritation tooth problem.  Cardiovascular :chest pain, pressure, heart tracing,palpaitations , sweating, leg swelling, high or low blood pressure  Pulmonary: Cough, yellow or green sputum, blood and sputum, shortness of breath, wheezing  Gastrointestinal: Nause, vomiting, diarrhea, constipation, pain, blood in stool, or vomitus, heartburn, difficulty swallowing  Genitourinary: incontinence, abnormal bleeding, abnormal discharge, urinary frequency, urinary  hesitancy, pain, impotence sexual problem, infection, urinary retention  Musculoskeletal: Pain, stiffness, joint, redness or warmth, arthritis, back pain, weakness, muscle wasting, sprain or fracture  Neuro: Weight weakness, dizziness, change in voice, change in taste change in vision, change in hearing, loss, or change of sensation, trouble walking, balance problems coordination problems, shaking, speech problem  Endocrine , cold or heat intolerance, blood sugar problem, weight gain or loss missed periods hot flashes, sweats, change in body hair, change in libido, increased thirst, increased urination  Heme/lymph: Swelling, bleeding, problem anemia, bruising, enlarged lymph nodes  Allergic/immunologic: H. plus nasal drip, watery itchy eyes, nasal drainage, immunosuppressed  The above, were reviewed and noted negative except as noted.    Positive swelling around the right knee denies any change in temperature around the right knee or any redness  Physical Exam     On physical examination    General   Alert, oriented x3 pleasant and cooperative. Does not look in any major distress.    HEENT  Pupils normal in size. Ears, nose, mouth, and throat appear to be in normal condition.  Head atraumatic      No signs of sedation or signs of withdrawal apparent.    Psychiatric   No signs of depression apparent.    Neuro   No focal neurological deficit apparent. Ambulation at baseline.with using a brace around the knee .  Reflexes symmetrical and positive no evidence of any swelling around the right knee or any discoloration or hypersensitivity or supersensitive to simple touch was described by the patient      Respiratory  No respiratory distress     Abdomen  no distention     Skin  No skin markings supportive of recent IV drug usage .    Cardiovascular  Regular rate and rhythm    Last Recorded Vitals  Blood pressure 126/80, pulse 79, temperature 35.7 °C (96.2 °F), resp. rate 18, SpO2 98%.  REVIEW OF LABORATORY DATA  I have  reviewed the following lab results:  WBC   Date Value Ref Range Status   03/31/2025 2.4 (L) 4.4 - 11.3 x10*3/uL Final     RBC   Date Value Ref Range Status   03/31/2025 3.56 (L) 4.00 - 5.20 x10*6/uL Final     Hemoglobin   Date Value Ref Range Status   03/31/2025 12.5 12.0 - 16.0 g/dL Final     Hematocrit   Date Value Ref Range Status   03/31/2025 35.2 (L) 36.0 - 46.0 % Final     MCV   Date Value Ref Range Status   03/31/2025 99 80 - 100 fL Final     MCH   Date Value Ref Range Status   03/31/2025 35.1 (H) 26.0 - 34.0 pg Final     MCHC   Date Value Ref Range Status   03/31/2025 35.5 32.0 - 36.0 g/dL Final     RDW   Date Value Ref Range Status   03/31/2025 12.7 11.5 - 14.5 % Final     Platelets   Date Value Ref Range Status   03/31/2025 172 150 - 450 x10*3/uL Final     Sodium   Date Value Ref Range Status   03/31/2025 138 136 - 145 mmol/L Final     Potassium   Date Value Ref Range Status   03/31/2025 4.4 3.5 - 5.3 mmol/L Final     Bicarbonate   Date Value Ref Range Status   03/31/2025 24 21 - 32 mmol/L Final     Urea Nitrogen   Date Value Ref Range Status   03/31/2025 31 (H) 6 - 23 mg/dL Final     Calcium   Date Value Ref Range Status   03/31/2025 10.8 (H) 8.6 - 10.6 mg/dL Final     Protime   Date Value Ref Range Status   01/20/2023 13.4 9.8 - 13.4 sec Final     INR   Date Value Ref Range Status   01/20/2023 1.2 (H) 0.9 - 1.1 Final         REVIEW OF RADIOLOGY   I have reviewed the following:  Radiology Studies             XR knee right 4+ views  Status: Final result     PACS Images     Show images for XR knee right 4+ views  Signed by    Signed Time Phone Pager   Jed Cardoso MD 2/24/2025 18:57 [phone]     Exam Information    Status Exam Begun Exam Ended   Final 2/24/2025 10:58 2/24/2025 11:08     Study Result    Narrative & Impression   Interpreted By:  Jed Cardoso,   STUDY:  XR KNEE RIGHT 4+ VIEWS; ;  2/24/2025 11:08 am      INDICATION:  Signs/Symptoms:Pain.      ,M25.561 Pain in right knee,G89.29  Other chronic pain      COMPARISON:  None.      ACCESSION NUMBER(S):  [id number]      ORDERING CLINICIAN:  CARLOS CLANCY      FINDINGS:  Right knee, four views      There is severe joint space narrowing osteophytosis in the medial  compartment with moderate size the lateral patellofemoral  compartment. There is a small effusion. Lucency over the medial  femoral condyle noted. No fracture seen otherwise      IMPRESSION:  Severe right knee osteoarthritis worse medially. Subchondral lucency  over the medial femoral condyle may represent subchondral  insufficiency fracture versus a cystic change          MACRO:  None      Signed by: Jed Cardoso 2/24/2025 6:57 PM  Dictation workstation:   [id number]     Assessment/Plan   69 years old with history and physical examination supportive of osteoarthritis of the right knee tried and failed conservative management    Plan  I discussed with the patient the different modalities available for the treatment of her condition I recommended for her a genicular nerve block to be performed under fluoroscopic guidance if she described positive response then we could proceed with the radiofrequency ablation of the genicular nerve of the right knee benefits and risks of the procedure were discussed and she would like to proceed      The above clinical summary has been dictated with voice recognition software. It has not been proofread for grammatical errors, typographical mistakes, or other semantic inconsistencies.    Thank you for visiting our office today. It was our pleasure to take part in your healthcare.     Please do not hesitate to contact the pain clinic after your visit with any questions or concerns at  M-F 8-4 pm       Masoud Meléndez M.D.  Medical Director , Division of Pain Medicine [facility]   of Anesthesiology and Pain Medicine  [employer] [facility] [employer]     [facility]  [address] [address] [place] [id number]     Office: [phone]  Fax: [phone]      Masoud Meléndez MD       [1]   Past Medical History:  Diagnosis Date    Breast cancer     Encounter for gynecological examination (general) (routine) without abnormal findings     Encounter for gynecological examination without abnormal finding    Encounter for screening mammogram for malignant neoplasm of breast     Visit for screening mammogram    Personal history of irradiation     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    Personal history of other diseases of the circulatory system     History of hypertension   [2]   Past Surgical History:  Procedure Laterality Date    APPENDECTOMY  10/05/2015    Appendectomy    HYSTERECTOMY  10/05/2015    Supracervical Hysterectomy    MASTECTOMY Right 2022    OTHER SURGICAL HISTORY  06/27/2022    Tongue surgery    OTHER SURGICAL HISTORY  06/27/2022    Neck dissection modified radical    OTHER SURGICAL HISTORY  08/04/2020    Split thickness skin graft    TOTAL ABDOMINAL HYSTERECTOMY W/ BILATERAL SALPINGOOPHORECTOMY  10/05/2015    Salpingo-oophorectomy Bilateral   [3]   Family History  Problem Relation Name Age of Onset    Aortic stenosis Mother      Diabetes Father      Stomach cancer Father      Colon cancer Father's Brother      Cancer Other     [4]   Allergies  Allergen Reactions    Iodine Hives    Iodinated Contrast Media Rash, Swelling and Unknown    Sulfa (Sulfonamide Antibiotics) Rash, Swelling, Unknown and Hives

## 2025-04-24 NOTE — H&P
History Of Present Illness  Radha Shabazz is a 69 y.o. female presenting with Right knee medial aspect this has been for the past 4 years   Recent xrays 6 weeks ago showed advanced osteoarthritis of the right knee she has been regularly on the ibuprofen 600 mg at least twice per day rating her pain currently at a level of 7 out of 10 limiting her activity pain is aggravated by standing and walking she is wearing a brace with no significant improvement she tried physical therapy exercises at home that did not provide her with any significant improvement she did receive in the past a steroid and local anesthetic injection in the joint that provided her with great pain relief her last intervention was performed in February 2025 describing the pain as a throbbing sharp sensation     Past Medical History  Medical History[1]  Surgical History  Surgical History[2]  Social History  She reports that she has never smoked. She has never used smokeless tobacco. No history on file for alcohol use and drug use.    Family History  Family History[3]     Allergies  Allergies[4]  Review of Systems   12 Systems have been reviewed as follows.   Constitutional: Fever, weight gain, weight loss, appetite change, night sweats, fatigue, chills.  Eyes : blurry, double vision, vision, loss, tearing, redness, pain, sensitivity to light, glaucoma.  Ears, nose, mouth, and throat: Hearing loss, ringing in the ears, ear pain, nasal congestion, nasal drainage, nosebleeds, mouth, throat, irritation tooth problem.  Cardiovascular :chest pain, pressure, heart tracing,palpaitations , sweating, leg swelling, high or low blood pressure  Pulmonary: Cough, yellow or green sputum, blood and sputum, shortness of breath, wheezing  Gastrointestinal: Nause, vomiting, diarrhea, constipation, pain, blood in stool, or vomitus, heartburn, difficulty swallowing  Genitourinary: incontinence, abnormal bleeding, abnormal discharge, urinary frequency, urinary  hesitancy, pain, impotence sexual problem, infection, urinary retention  Musculoskeletal: Pain, stiffness, joint, redness or warmth, arthritis, back pain, weakness, muscle wasting, sprain or fracture  Neuro: Weight weakness, dizziness, change in voice, change in taste change in vision, change in hearing, loss, or change of sensation, trouble walking, balance problems coordination problems, shaking, speech problem  Endocrine , cold or heat intolerance, blood sugar problem, weight gain or loss missed periods hot flashes, sweats, change in body hair, change in libido, increased thirst, increased urination  Heme/lymph: Swelling, bleeding, problem anemia, bruising, enlarged lymph nodes  Allergic/immunologic: H. plus nasal drip, watery itchy eyes, nasal drainage, immunosuppressed  The above, were reviewed and noted negative except as noted.    Positive swelling around the right knee denies any change in temperature around the right knee or any redness  Physical Exam     On physical examination    General   Alert, oriented x3 pleasant and cooperative. Does not look in any major distress.    HEENT  Pupils normal in size. Ears, nose, mouth, and throat appear to be in normal condition.  Head atraumatic      No signs of sedation or signs of withdrawal apparent.    Psychiatric   No signs of depression apparent.    Neuro   No focal neurological deficit apparent. Ambulation at baseline.with using a brace around the knee .  Reflexes symmetrical and positive no evidence of any swelling around the right knee or any discoloration or hypersensitivity or supersensitive to simple touch was described by the patient      Respiratory  No respiratory distress     Abdomen  no distention     Skin  No skin markings supportive of recent IV drug usage .    Cardiovascular  Regular rate and rhythm    Last Recorded Vitals  Blood pressure 126/80, pulse 79, temperature 35.7 °C (96.2 °F), resp. rate 18, SpO2 98%.  REVIEW OF LABORATORY DATA  I have  reviewed the following lab results:  WBC   Date Value Ref Range Status   03/31/2025 2.4 (L) 4.4 - 11.3 x10*3/uL Final     RBC   Date Value Ref Range Status   03/31/2025 3.56 (L) 4.00 - 5.20 x10*6/uL Final     Hemoglobin   Date Value Ref Range Status   03/31/2025 12.5 12.0 - 16.0 g/dL Final     Hematocrit   Date Value Ref Range Status   03/31/2025 35.2 (L) 36.0 - 46.0 % Final     MCV   Date Value Ref Range Status   03/31/2025 99 80 - 100 fL Final     MCH   Date Value Ref Range Status   03/31/2025 35.1 (H) 26.0 - 34.0 pg Final     MCHC   Date Value Ref Range Status   03/31/2025 35.5 32.0 - 36.0 g/dL Final     RDW   Date Value Ref Range Status   03/31/2025 12.7 11.5 - 14.5 % Final     Platelets   Date Value Ref Range Status   03/31/2025 172 150 - 450 x10*3/uL Final     Sodium   Date Value Ref Range Status   03/31/2025 138 136 - 145 mmol/L Final     Potassium   Date Value Ref Range Status   03/31/2025 4.4 3.5 - 5.3 mmol/L Final     Bicarbonate   Date Value Ref Range Status   03/31/2025 24 21 - 32 mmol/L Final     Urea Nitrogen   Date Value Ref Range Status   03/31/2025 31 (H) 6 - 23 mg/dL Final     Calcium   Date Value Ref Range Status   03/31/2025 10.8 (H) 8.6 - 10.6 mg/dL Final     Protime   Date Value Ref Range Status   01/20/2023 13.4 9.8 - 13.4 sec Final     INR   Date Value Ref Range Status   01/20/2023 1.2 (H) 0.9 - 1.1 Final         REVIEW OF RADIOLOGY   I have reviewed the following:  Radiology Studies             XR knee right 4+ views  Status: Final result     PACS Images     Show images for XR knee right 4+ views  Signed by    Signed Time Phone Pager   Jed Cardoso MD 2/24/2025 18:57 [phone]     Exam Information    Status Exam Begun Exam Ended   Final 2/24/2025 10:58 2/24/2025 11:08     Study Result    Narrative & Impression   Interpreted By:  Jed Cardoso,   STUDY:  XR KNEE RIGHT 4+ VIEWS; ;  2/24/2025 11:08 am      INDICATION:  Signs/Symptoms:Pain.      ,M25.561 Pain in right knee,G89.29  Other chronic pain      COMPARISON:  None.      ACCESSION NUMBER(S):  [id number]      ORDERING CLINICIAN:  CARLOS CLANCY      FINDINGS:  Right knee, four views      There is severe joint space narrowing osteophytosis in the medial  compartment with moderate size the lateral patellofemoral  compartment. There is a small effusion. Lucency over the medial  femoral condyle noted. No fracture seen otherwise      IMPRESSION:  Severe right knee osteoarthritis worse medially. Subchondral lucency  over the medial femoral condyle may represent subchondral  insufficiency fracture versus a cystic change          MACRO:  None      Signed by: Jed Cardoso 2/24/2025 6:57 PM  Dictation workstation:   [id number]     Assessment/Plan   69 years old with history and physical examination supportive of osteoarthritis of the right knee tried and failed conservative management    Plan  I discussed with the patient the different modalities available for the treatment of her condition I recommended for her a genicular nerve block to be performed under fluoroscopic guidance if she described positive response then we could proceed with the radiofrequency ablation of the genicular nerve of the right knee benefits and risks of the procedure were discussed and she would like to proceed      The above clinical summary has been dictated with voice recognition software. It has not been proofread for grammatical errors, typographical mistakes, or other semantic inconsistencies.    Thank you for visiting our office today. It was our pleasure to take part in your healthcare.     Please do not hesitate to contact the pain clinic after your visit with any questions or concerns at  M-F 8-4 pm       Masoud Meléndez M.D.  Medical Director , Division of Pain Medicine [facility]   of Anesthesiology and Pain Medicine  [facility]     [facility]  [address] [address] [place] [id number]     Office: [phone]  Fax: [phone]      Masoud Meléndez MD       [1]   Past Medical History:  Diagnosis Date    Breast cancer     Encounter for gynecological examination (general) (routine) without abnormal findings     Encounter for gynecological examination without abnormal finding    Encounter for screening mammogram for malignant neoplasm of breast     Visit for screening mammogram    Personal history of irradiation     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    Personal history of other diseases of the circulatory system     History of hypertension   [2]   Past Surgical History:  Procedure Laterality Date    APPENDECTOMY  10/05/2015    Appendectomy    HYSTERECTOMY  10/05/2015    Supracervical Hysterectomy    MASTECTOMY Right 2022    OTHER SURGICAL HISTORY  06/27/2022    Tongue surgery    OTHER SURGICAL HISTORY  06/27/2022    Neck dissection modified radical    OTHER SURGICAL HISTORY  08/04/2020    Split thickness skin graft    TOTAL ABDOMINAL HYSTERECTOMY W/ BILATERAL SALPINGOOPHORECTOMY  10/05/2015    Salpingo-oophorectomy Bilateral   [3]   Family History  Problem Relation Name Age of Onset    Aortic stenosis Mother      Diabetes Father      Stomach cancer Father      Colon cancer Father's Brother      Cancer Other     [4]   Allergies  Allergen Reactions    Iodine Hives    Iodinated Contrast Media Rash, Swelling and Unknown    Sulfa (Sulfonamide Antibiotics) Rash, Swelling, Unknown and Hives

## 2025-04-30 ENCOUNTER — APPOINTMENT (OUTPATIENT)
Dept: UROLOGY | Facility: CLINIC | Age: 70
End: 2025-04-30
Payer: COMMERCIAL

## 2025-04-30 DIAGNOSIS — N20.0 NEPHROLITHIASIS: Primary | ICD-10-CM

## 2025-04-30 PROCEDURE — 99213 OFFICE O/P EST LOW 20 MIN: CPT | Performed by: UROLOGY

## 2025-04-30 PROCEDURE — G2211 COMPLEX E/M VISIT ADD ON: HCPCS | Performed by: UROLOGY

## 2025-04-30 NOTE — PROGRESS NOTES
Subjective     This visit was completed via telemedicine. All issues as below were discussed and addressed but no physical exam was performed unless allowed by visual confirmation. If it was felt that the patient should be evaluated in clinic, then they were directed there. Patient verbally consented to visit.    Radha Shabazz is a 69 y.o. female with bilateral nephrolithiasis, breast cancer, tongue cancer and HTN. Her surgical history is significant for mastectomy, appendectomy and salpingo-oophorectomy. She presents today to review renal US. Patient has no new complaints.         Past Medical History:   Diagnosis Date    Breast cancer     Encounter for gynecological examination (general) (routine) without abnormal findings     Encounter for gynecological examination without abnormal finding    Encounter for screening mammogram for malignant neoplasm of breast     Visit for screening mammogram    Personal history of irradiation     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    Personal history of other diseases of the circulatory system     History of hypertension     Past Surgical History:   Procedure Laterality Date    APPENDECTOMY  10/05/2015    Appendectomy    HYSTERECTOMY  10/05/2015    Supracervical Hysterectomy    MASTECTOMY Right 2022    OTHER SURGICAL HISTORY  06/27/2022    Tongue surgery    OTHER SURGICAL HISTORY  06/27/2022    Neck dissection modified radical    OTHER SURGICAL HISTORY  08/04/2020    Split thickness skin graft    TOTAL ABDOMINAL HYSTERECTOMY W/ BILATERAL SALPINGOOPHORECTOMY  10/05/2015    Salpingo-oophorectomy Bilateral     Family History   Problem Relation Name Age of Onset    Aortic stenosis Mother      Diabetes Father      Stomach cancer Father      Colon cancer Father's Brother      Cancer Other       Current Outpatient Medications   Medication Sig Dispense Refill    anastrozole (Arimidex) 1 mg tablet Take 1 tablet (1 mg total) by mouth  once daily.  Swallow whole with a drink of water. 90 tablet 3    anastrozole (Arimidex) 1 mg tablet Take 1 tablet (1 mg total) by mouth once daily.  Swallow whole with a drink of water. 90 tablet 1    escitalopram (Lexapro) 5 mg tablet Take 1 tablet (5 mg) by mouth once daily.      nitrofurantoin, macrocrystal-monohydrate, (Macrobid) 100 mg capsule Take 1 capsule (100 mg) by mouth every 12 hours. 14 capsule 0    omeprazole (PriLOSEC) 20 mg DR capsule Take 1 capsule (20 mg) by mouth once daily.      rosuvastatin (Crestor) 20 mg tablet Take 1 tablet (20 mg) by mouth once daily. 90 tablet 3    sodium hyaluronate (Durolane) 60 mg/3 mL injection Inject one syringe(60mg/3mL) into right knee one time at doctors office 3 mL 0    solifenacin (Vesicare) 10 mg tablet Take 1 tablet (10 mg) by mouth once daily. Swallow tablet whole; do not crush, chew, or split. 30 tablet 2    topiramate (Topamax) 25 mg tablet Take 1 tablet (25 mg) by mouth once daily as needed.      UNABLE TO FIND Breast prosthesis for left and/or right breast      UNABLE TO FIND Mastectomy Bra for Right Mastectomy       No current facility-administered medications for this visit.     Allergies   Allergen Reactions    Iodine Hives    Iodinated Contrast Media Rash, Swelling and Unknown    Sulfa (Sulfonamide Antibiotics) Rash, Swelling, Unknown and Hives     Social History     Socioeconomic History    Marital status:      Spouse name: Not on file    Number of children: Not on file    Years of education: Not on file    Highest education level: Not on file   Occupational History    Not on file   Tobacco Use    Smoking status: Never    Smokeless tobacco: Never   Substance and Sexual Activity    Alcohol use: Not on file    Drug use: Not on file    Sexual activity: Not on file   Other Topics Concern    Not on file   Social History Narrative    Not on file     Social Drivers of Health     Financial Resource Strain: Not on file   Food Insecurity: Not on file    Transportation Needs: Not on file   Physical Activity: Not on file   Stress: Not on file   Social Connections: Not on file   Intimate Partner Violence: Not on file   Housing Stability: Not on file       Review of Systems  Pertinent items are noted in HPI.    Objective     Lab Review  Lab Results   Component Value Date    WBC 2.4 (L) 03/31/2025    RBC 3.56 (L) 03/31/2025    HGB 12.5 03/31/2025    HCT 35.2 (L) 03/31/2025     03/31/2025      Lab Results   Component Value Date    BUN 31 (H) 03/31/2025    CREATININE 1.32 (H) 03/31/2025          Assessment/Plan   There are no diagnoses linked to this encounter.    Bilateral Nephrolithiasis     I personally and independently reviewed images of the renal US from 4/21/2025 which showed nonobstructing stone within the lower pole of both kidneys, unchanged since 03/25/2024.    We discussed that most calculi under 5 mm can be safely observed. This recommendation is based on large series looking at spontaneous passage rates that suggest that the likelihood of stone passage is highest for stones under 4 mm in size (approximately 70-80%), moderate for stones between 4 and 6 mm (50%), and lowest for stones 6 mm or greater (20%-30%).      However, stones under 5 mm that are in a solitary kidney, associated with infection or causing significant obstruction should be removed to prevent loss of renal function and/or sepsis. Also, every patient has different anatomy and different ability to pass calculi and tolerate pain, so intervention is also recommended in patients with small stones that are in significant discomfort or that have a history of being unable to pass small calculi. Patient understands and desires to proceed.    We will follow up annually with renal US to monitor stone presence and formation.         All questions were answered to the patient's satisfaction. Patient agrees with the plan and wishes to proceed. Follow-up will be scheduled appropriately.     E&M  visit today is associated with current or anticipated ongoing medical care services related to a patient's single, serious condition or a complex condition.    Scribed for Dr. Ward by Delaney Garcia. I , Dr Ward, have personally reviewed and agreed with the information entered by the Virtual Scribe.

## 2025-05-01 ENCOUNTER — HOSPITAL ENCOUNTER (OUTPATIENT)
Dept: PAIN MEDICINE | Facility: CLINIC | Age: 70
Discharge: HOME | End: 2025-05-01
Payer: COMMERCIAL

## 2025-05-01 VITALS
SYSTOLIC BLOOD PRESSURE: 138 MMHG | OXYGEN SATURATION: 96 % | TEMPERATURE: 96.6 F | HEART RATE: 80 BPM | RESPIRATION RATE: 16 BRPM | DIASTOLIC BLOOD PRESSURE: 99 MMHG

## 2025-05-01 DIAGNOSIS — R73.9 HYPERGLYCEMIA: ICD-10-CM

## 2025-05-01 DIAGNOSIS — M25.561 CHRONIC PAIN OF RIGHT KNEE: ICD-10-CM

## 2025-05-01 DIAGNOSIS — G89.29 CHRONIC PAIN OF RIGHT KNEE: ICD-10-CM

## 2025-05-01 PROCEDURE — 2500000004 HC RX 250 GENERAL PHARMACY W/ HCPCS (ALT 636 FOR OP/ED): Mod: JZ | Performed by: PAIN MEDICINE

## 2025-05-01 PROCEDURE — 64454 NJX AA&/STRD GNCLR NRV BRNCH: CPT | Performed by: PAIN MEDICINE

## 2025-05-01 RX ORDER — BUPIVACAINE HYDROCHLORIDE 5 MG/ML
INJECTION, SOLUTION EPIDURAL; INTRACAUDAL; PERINEURAL AS NEEDED
Status: DISCONTINUED | OUTPATIENT
Start: 2025-05-01 | End: 2025-05-02 | Stop reason: HOSPADM

## 2025-05-01 RX ORDER — LIDOCAINE HYDROCHLORIDE 10 MG/ML
INJECTION, SOLUTION EPIDURAL; INFILTRATION; INTRACAUDAL; PERINEURAL AS NEEDED
Status: DISCONTINUED | OUTPATIENT
Start: 2025-05-01 | End: 2025-05-02 | Stop reason: HOSPADM

## 2025-05-01 RX ORDER — METHYLPREDNISOLONE ACETATE 80 MG/ML
INJECTION, SUSPENSION INTRA-ARTICULAR; INTRALESIONAL; INTRAMUSCULAR; SOFT TISSUE AS NEEDED
Status: DISCONTINUED | OUTPATIENT
Start: 2025-05-01 | End: 2025-05-02 | Stop reason: HOSPADM

## 2025-05-01 RX ADMIN — LIDOCAINE HYDROCHLORIDE 5 ML: 10 INJECTION, SOLUTION EPIDURAL; INFILTRATION; INTRACAUDAL; PERINEURAL at 14:43

## 2025-05-01 RX ADMIN — METHYLPREDNISOLONE ACETATE 80 MG: 80 INJECTION, SUSPENSION INTRA-ARTICULAR; INTRALESIONAL; INTRAMUSCULAR; SOFT TISSUE at 14:48

## 2025-05-01 RX ADMIN — BUPIVACAINE HYDROCHLORIDE 10 ML: 5 INJECTION, SOLUTION EPIDURAL; INTRACAUDAL; PERINEURAL at 14:48

## 2025-05-01 NOTE — DISCHARGE INSTRUCTIONS
Nerve Blocks    Why is this procedure done?  Nerve blocks can help to manage pain. By giving you a drug into an exact group of nerves, your doctor may be able to block pain to a specific part of your body. Some nerve blocks are used after surgery, especially if you have had an abdominal surgery. Nerve blocks are used before surgery to lessen the need for opioid drugs during and after surgery.  There are a few kinds of nerve blocks. Some nerve blocks are used to:  Treat pain. These may have a pain drug and a drug to help with swelling.  Find where your pain is coming from. This kind of nerve block will have a pain drug that lasts for only a certain amount of time.  See if another kind of treatment like surgery will help your pain.  Prevent pain during or after a procedure.  Help you avoid surgery.  Give relief of pain during and after surgery.  Lessen the use of opioid drugs needed for pain after surgery.  Block the pain in an area of the body during surgery as anesthesia.  What will the results be?  The nerve block may help to treat or ease your pain. The area may be numb. You may have some pain relief right away. You may be able to use fewer pain medicines after surgery. It also may be easier for you to move around after surgery. Some nerve blocks go away within a few hours. Others give you pain relief for a day or so to a few months or longer. Some nerve blocks can take a few days to work fully.  What happens before the procedure?  Your doctor will ask you about your health history. Talk to the doctor about:  All the drugs you are taking. Be sure to include all prescription, over the counter, and herbal supplements. Tell the doctor if you have any drug allergy. Bring a list of drugs you take with you.  Any bleeding problems. Be sure to tell your doctor if you are taking any drugs that may cause bleeding. Some of these are warfarin, rivaroxaban, apixaban, ticagrelor, clopidogrel, ibuprofen, naproxen, or aspirin.  Certain vitamins and herbs, such as garlic and fish oil, may also add to the risk for bleeding. You may need to stop these drugs as well. Talk to your doctor about them.  What happens during the procedure?  Sometimes, the doctor will give you a special drug to make you sleepy for the nerve block. Other times, you are completely awake. You may also have a nerve block as a part of your surgery.  The doctor will position you in a way to give them easy access to where you will be having the nerve block.  The doctor will clean the area and give you a local numbing drug. The doctor will use a long thin needle to give you the nerve block. Often, the doctor will use a special x-ray, ultrasound, or CT scan to make sure the needle is in the right place. The doctor will inject the drug close to the nerve that is causing your pain. Sometimes they inject the drug in an area that will block pain from a few nerves.  The doctor will take out the needle and place a clean bandage on your skin.  Sometimes, the doctor may leave a catheter in place to deliver medicine over 1 to 2 days. You may have to return to your doctor's office to have the catheter removed.  The procedure takes 15 to 30 minutes.  What happens after the procedure?  If you are not having surgery, you will be able to go home the same day. You may be asked to rest for 15 to 30 minutes. If the doctor gives you a special drug to make you sleepy for the procedure, you will need someone to drive you home.  What care is needed at home?  You will be allowed to shower or take a bath later that same day unless you have a catheter still in place.  You may need other medicines to help with pain as your nerve block wears off.  What follow-up care is needed?  As your body absorbs the drugs, your pain may come back. Talk to your doctor about if you need another nerve block and how often you can have a nerve block.  What problems could happen?  Infection  Bleeding or bruising  Pain  at the injection site  Raised blood sugar  Rash  Itching  Numbness  Nerve injury  Allergic reaction  Puncture or laceration of an organ like the liver, spleen. or bowel  Numbing medicine injected into a blood vessel  Last Reviewed Date  2020-04-22

## 2025-05-02 ENCOUNTER — TELEPHONE (OUTPATIENT)
Dept: PAIN MEDICINE | Facility: CLINIC | Age: 70
End: 2025-05-02
Payer: COMMERCIAL

## 2025-05-02 DIAGNOSIS — G89.29 CHRONIC PAIN OF RIGHT KNEE: ICD-10-CM

## 2025-05-02 DIAGNOSIS — M25.561 CHRONIC PAIN OF RIGHT KNEE: ICD-10-CM

## 2025-05-02 DIAGNOSIS — M17.11 ARTHRITIS OF RIGHT KNEE: Primary | ICD-10-CM

## 2025-05-02 NOTE — TELEPHONE ENCOUNTER
Patient called to report 95% relief after right genicular nerve block. Pain relief is still ongoing at this time.

## 2025-05-10 ENCOUNTER — HOSPITAL ENCOUNTER (INPATIENT)
Facility: HOSPITAL | Age: 70
End: 2025-05-10
Attending: STUDENT IN AN ORGANIZED HEALTH CARE EDUCATION/TRAINING PROGRAM | Admitting: INTERNAL MEDICINE
Payer: COMMERCIAL

## 2025-05-10 ENCOUNTER — APPOINTMENT (OUTPATIENT)
Dept: RADIOLOGY | Facility: HOSPITAL | Age: 70
End: 2025-05-10
Payer: COMMERCIAL

## 2025-05-10 ENCOUNTER — NURSE TRIAGE (OUTPATIENT)
Dept: HEMATOLOGY/ONCOLOGY | Facility: HOSPITAL | Age: 70
End: 2025-05-10
Payer: COMMERCIAL

## 2025-05-10 DIAGNOSIS — R11.10 EMESIS: Primary | ICD-10-CM

## 2025-05-10 DIAGNOSIS — N39.0 RECURRENT UTI: ICD-10-CM

## 2025-05-10 PROBLEM — R11.2 NAUSEA AND VOMITING: Status: ACTIVE | Noted: 2025-05-10

## 2025-05-10 LAB
ALBUMIN SERPL BCP-MCNC: 4.4 G/DL (ref 3.4–5)
ALP SERPL-CCNC: 49 U/L (ref 33–136)
ALT SERPL W P-5'-P-CCNC: 15 U/L (ref 7–45)
ANION GAP SERPL CALC-SCNC: 14 MMOL/L (ref 10–20)
APPEARANCE UR: ABNORMAL
AST SERPL W P-5'-P-CCNC: 19 U/L (ref 9–39)
BASOPHILS # BLD AUTO: 0.02 X10*3/UL (ref 0–0.1)
BASOPHILS NFR BLD AUTO: 0.9 %
BILIRUB SERPL-MCNC: 1 MG/DL (ref 0–1.2)
BILIRUB UR STRIP.AUTO-MCNC: NEGATIVE MG/DL
BNP SERPL-MCNC: 25 PG/ML (ref 0–99)
BUN SERPL-MCNC: 15 MG/DL (ref 6–23)
CALCIUM SERPL-MCNC: 10.6 MG/DL (ref 8.6–10.6)
CARDIAC TROPONIN I PNL SERPL HS: 9 NG/L (ref 0–34)
CHLORIDE SERPL-SCNC: 100 MMOL/L (ref 98–107)
CK SERPL-CCNC: 246 U/L (ref 0–215)
CO2 SERPL-SCNC: 24 MMOL/L (ref 21–32)
COLOR UR: ABNORMAL
CREAT SERPL-MCNC: 1.19 MG/DL (ref 0.5–1.05)
CREAT UR-MCNC: 79.1 MG/DL (ref 20–320)
EGFRCR SERPLBLD CKD-EPI 2021: 50 ML/MIN/1.73M*2
EOSINOPHIL # BLD AUTO: 0 X10*3/UL (ref 0–0.7)
EOSINOPHIL NFR BLD AUTO: 0 %
ERYTHROCYTE [DISTWIDTH] IN BLOOD BY AUTOMATED COUNT: 12.1 % (ref 11.5–14.5)
GLUCOSE SERPL-MCNC: 106 MG/DL (ref 74–99)
GLUCOSE UR STRIP.AUTO-MCNC: NORMAL MG/DL
HCT VFR BLD AUTO: 43.2 % (ref 36–46)
HGB BLD-MCNC: 15.3 G/DL (ref 12–16)
IMM GRANULOCYTES # BLD AUTO: 0.02 X10*3/UL (ref 0–0.7)
IMM GRANULOCYTES NFR BLD AUTO: 0.9 % (ref 0–0.9)
KETONES UR STRIP.AUTO-MCNC: NEGATIVE MG/DL
LEUKOCYTE ESTERASE UR QL STRIP.AUTO: ABNORMAL
LIPASE SERPL-CCNC: 15 U/L (ref 9–82)
LYMPHOCYTES # BLD AUTO: 0.26 X10*3/UL (ref 1.2–4.8)
LYMPHOCYTES NFR BLD AUTO: 11.6 %
MAGNESIUM SERPL-MCNC: 2.11 MG/DL (ref 1.6–2.4)
MCH RBC QN AUTO: 34 PG (ref 26–34)
MCHC RBC AUTO-ENTMCNC: 35.4 G/DL (ref 32–36)
MCV RBC AUTO: 96 FL (ref 80–100)
MONOCYTES # BLD AUTO: 0.26 X10*3/UL (ref 0.1–1)
MONOCYTES NFR BLD AUTO: 11.6 %
MUCOUS THREADS #/AREA URNS AUTO: ABNORMAL /LPF
NEUTROPHILS # BLD AUTO: 1.69 X10*3/UL (ref 1.2–7.7)
NEUTROPHILS NFR BLD AUTO: 75 %
NITRITE UR QL STRIP.AUTO: ABNORMAL
NRBC BLD-RTO: 0 /100 WBCS (ref 0–0)
OSMOLALITY UR: 403 MOSM/KG (ref 200–1200)
OVALOCYTES BLD QL SMEAR: NORMAL
PH UR STRIP.AUTO: 6.5 [PH]
PLATELET # BLD AUTO: 111 X10*3/UL (ref 150–450)
POTASSIUM SERPL-SCNC: 3.9 MMOL/L (ref 3.5–5.3)
PROT SERPL-MCNC: 7.7 G/DL (ref 6.4–8.2)
PROT UR STRIP.AUTO-MCNC: ABNORMAL MG/DL
RBC # BLD AUTO: 4.5 X10*6/UL (ref 4–5.2)
RBC # UR STRIP.AUTO: ABNORMAL MG/DL
RBC #/AREA URNS AUTO: ABNORMAL /HPF
RBC MORPH BLD: NORMAL
SCHISTOCYTES BLD QL SMEAR: NORMAL
SODIUM SERPL-SCNC: 134 MMOL/L (ref 136–145)
SODIUM UR-SCNC: 86 MMOL/L
SODIUM/CREAT UR-RTO: 109 MMOL/G CREAT
SP GR UR STRIP.AUTO: 1.01
SQUAMOUS #/AREA URNS AUTO: ABNORMAL /HPF
UROBILINOGEN UR STRIP.AUTO-MCNC: NORMAL MG/DL
WBC # BLD AUTO: 2.3 X10*3/UL (ref 4.4–11.3)
WBC #/AREA URNS AUTO: >50 /HPF
YEAST BUDDING #/AREA UR COMP ASSIST: PRESENT /HPF

## 2025-05-10 PROCEDURE — 2500000002 HC RX 250 W HCPCS SELF ADMINISTERED DRUGS (ALT 637 FOR MEDICARE OP, ALT 636 FOR OP/ED)

## 2025-05-10 PROCEDURE — 99223 1ST HOSP IP/OBS HIGH 75: CPT

## 2025-05-10 PROCEDURE — 2500000004 HC RX 250 GENERAL PHARMACY W/ HCPCS (ALT 636 FOR OP/ED): Mod: JZ

## 2025-05-10 PROCEDURE — 99285 EMERGENCY DEPT VISIT HI MDM: CPT | Mod: 25 | Performed by: STUDENT IN AN ORGANIZED HEALTH CARE EDUCATION/TRAINING PROGRAM

## 2025-05-10 PROCEDURE — 1170000001 HC PRIVATE ONCOLOGY ROOM DAILY

## 2025-05-10 PROCEDURE — 70450 CT HEAD/BRAIN W/O DYE: CPT | Performed by: RADIOLOGY

## 2025-05-10 PROCEDURE — 96361 HYDRATE IV INFUSION ADD-ON: CPT

## 2025-05-10 PROCEDURE — 2500000001 HC RX 250 WO HCPCS SELF ADMINISTERED DRUGS (ALT 637 FOR MEDICARE OP)

## 2025-05-10 PROCEDURE — 71045 X-RAY EXAM CHEST 1 VIEW: CPT

## 2025-05-10 PROCEDURE — 83690 ASSAY OF LIPASE: CPT

## 2025-05-10 PROCEDURE — 83935 ASSAY OF URINE OSMOLALITY: CPT | Performed by: STUDENT IN AN ORGANIZED HEALTH CARE EDUCATION/TRAINING PROGRAM

## 2025-05-10 PROCEDURE — 84484 ASSAY OF TROPONIN QUANT: CPT

## 2025-05-10 PROCEDURE — 82550 ASSAY OF CK (CPK): CPT

## 2025-05-10 PROCEDURE — 83880 ASSAY OF NATRIURETIC PEPTIDE: CPT

## 2025-05-10 PROCEDURE — 81001 URINALYSIS AUTO W/SCOPE: CPT

## 2025-05-10 PROCEDURE — 80053 COMPREHEN METABOLIC PANEL: CPT

## 2025-05-10 PROCEDURE — 85025 COMPLETE CBC W/AUTO DIFF WBC: CPT

## 2025-05-10 PROCEDURE — 82570 ASSAY OF URINE CREATININE: CPT | Performed by: STUDENT IN AN ORGANIZED HEALTH CARE EDUCATION/TRAINING PROGRAM

## 2025-05-10 PROCEDURE — 36415 COLL VENOUS BLD VENIPUNCTURE: CPT

## 2025-05-10 PROCEDURE — 87040 BLOOD CULTURE FOR BACTERIA: CPT

## 2025-05-10 PROCEDURE — 96375 TX/PRO/DX INJ NEW DRUG ADDON: CPT

## 2025-05-10 PROCEDURE — 70450 CT HEAD/BRAIN W/O DYE: CPT

## 2025-05-10 PROCEDURE — 96365 THER/PROPH/DIAG IV INF INIT: CPT

## 2025-05-10 PROCEDURE — 83735 ASSAY OF MAGNESIUM: CPT

## 2025-05-10 PROCEDURE — 71045 X-RAY EXAM CHEST 1 VIEW: CPT | Mod: FOREIGN READ | Performed by: RADIOLOGY

## 2025-05-10 RX ORDER — ROSUVASTATIN CALCIUM 20 MG/1
20 TABLET, COATED ORAL NIGHTLY
Status: DISCONTINUED | OUTPATIENT
Start: 2025-05-10 | End: 2025-05-13 | Stop reason: HOSPADM

## 2025-05-10 RX ORDER — MAGNESIUM SULFATE 1 G/100ML
INJECTION INTRAVENOUS
Status: COMPLETED
Start: 2025-05-10 | End: 2025-05-10

## 2025-05-10 RX ORDER — KETOROLAC TROMETHAMINE 15 MG/ML
15 INJECTION, SOLUTION INTRAMUSCULAR; INTRAVENOUS ONCE
Status: COMPLETED | OUTPATIENT
Start: 2025-05-10 | End: 2025-05-10

## 2025-05-10 RX ORDER — POLYETHYLENE GLYCOL 3350 17 G/17G
17 POWDER, FOR SOLUTION ORAL DAILY PRN
Status: CANCELLED | OUTPATIENT
Start: 2025-05-10

## 2025-05-10 RX ORDER — METOCLOPRAMIDE HYDROCHLORIDE 5 MG/ML
10 INJECTION INTRAMUSCULAR; INTRAVENOUS ONCE
Status: COMPLETED | OUTPATIENT
Start: 2025-05-10 | End: 2025-05-10

## 2025-05-10 RX ORDER — TOPIRAMATE 25 MG/1
25 TABLET, FILM COATED ORAL 2 TIMES DAILY PRN
Status: DISCONTINUED | OUTPATIENT
Start: 2025-05-10 | End: 2025-05-13 | Stop reason: HOSPADM

## 2025-05-10 RX ORDER — SODIUM CHLORIDE, SODIUM LACTATE, POTASSIUM CHLORIDE, CALCIUM CHLORIDE 600; 310; 30; 20 MG/100ML; MG/100ML; MG/100ML; MG/100ML
500 INJECTION, SOLUTION INTRAVENOUS CONTINUOUS
Status: DISCONTINUED | OUTPATIENT
Start: 2025-05-10 | End: 2025-05-10

## 2025-05-10 RX ORDER — ONDANSETRON HYDROCHLORIDE 2 MG/ML
4 INJECTION, SOLUTION INTRAVENOUS EVERY 8 HOURS PRN
Status: DISCONTINUED | OUTPATIENT
Start: 2025-05-10 | End: 2025-05-13 | Stop reason: HOSPADM

## 2025-05-10 RX ORDER — OXYBUTYNIN CHLORIDE 5 MG/1
5 TABLET ORAL 3 TIMES DAILY
Status: DISCONTINUED | OUTPATIENT
Start: 2025-05-10 | End: 2025-05-13 | Stop reason: HOSPADM

## 2025-05-10 RX ORDER — ESCITALOPRAM OXALATE 10 MG/1
5 TABLET ORAL DAILY
Status: DISCONTINUED | OUTPATIENT
Start: 2025-05-10 | End: 2025-05-13 | Stop reason: HOSPADM

## 2025-05-10 RX ORDER — POLYETHYLENE GLYCOL 3350 17 G/17G
17 POWDER, FOR SOLUTION ORAL DAILY PRN
Status: DISCONTINUED | OUTPATIENT
Start: 2025-05-10 | End: 2025-05-13 | Stop reason: HOSPADM

## 2025-05-10 RX ORDER — PANTOPRAZOLE SODIUM 40 MG/1
40 TABLET, DELAYED RELEASE ORAL
Status: DISCONTINUED | OUTPATIENT
Start: 2025-05-11 | End: 2025-05-13 | Stop reason: HOSPADM

## 2025-05-10 RX ORDER — ANASTROZOLE 1 MG/1
1 TABLET ORAL DAILY
Status: DISCONTINUED | OUTPATIENT
Start: 2025-05-10 | End: 2025-05-13 | Stop reason: HOSPADM

## 2025-05-10 RX ORDER — ACETAMINOPHEN 325 MG/1
975 TABLET ORAL 3 TIMES DAILY PRN
Status: CANCELLED | OUTPATIENT
Start: 2025-05-10

## 2025-05-10 RX ORDER — ACETAMINOPHEN 325 MG/1
975 TABLET ORAL 3 TIMES DAILY PRN
Status: DISCONTINUED | OUTPATIENT
Start: 2025-05-10 | End: 2025-05-13 | Stop reason: HOSPADM

## 2025-05-10 RX ORDER — MAGNESIUM SULFATE 1 G/100ML
1 INJECTION INTRAVENOUS ONCE
Status: COMPLETED | OUTPATIENT
Start: 2025-05-10 | End: 2025-05-10

## 2025-05-10 RX ORDER — TOPIRAMATE 25 MG/1
25 TABLET, FILM COATED ORAL DAILY PRN
Status: DISCONTINUED | OUTPATIENT
Start: 2025-05-10 | End: 2025-05-10

## 2025-05-10 RX ORDER — ENOXAPARIN SODIUM 100 MG/ML
40 INJECTION SUBCUTANEOUS EVERY 24 HOURS
Status: DISCONTINUED | OUTPATIENT
Start: 2025-05-10 | End: 2025-05-13 | Stop reason: HOSPADM

## 2025-05-10 RX ADMIN — MAGNESIUM SULFATE HEPTAHYDRATE 1 G: 1 INJECTION, SOLUTION INTRAVENOUS at 17:59

## 2025-05-10 RX ADMIN — ACETAMINOPHEN 975 MG: 325 TABLET, FILM COATED ORAL at 23:32

## 2025-05-10 RX ADMIN — KETOROLAC TROMETHAMINE 15 MG: 15 INJECTION, SOLUTION INTRAMUSCULAR; INTRAVENOUS at 17:57

## 2025-05-10 RX ADMIN — ESCITALOPRAM OXALATE 5 MG: 10 TABLET ORAL at 23:32

## 2025-05-10 RX ADMIN — MAGNESIUM SULFATE 1 G: 1 INJECTION INTRAVENOUS at 17:59

## 2025-05-10 RX ADMIN — ENOXAPARIN SODIUM 40 MG: 100 INJECTION SUBCUTANEOUS at 23:33

## 2025-05-10 RX ADMIN — METOCLOPRAMIDE 10 MG: 5 INJECTION, SOLUTION INTRAMUSCULAR; INTRAVENOUS at 15:29

## 2025-05-10 RX ADMIN — SODIUM CHLORIDE 1000 ML: 0.9 INJECTION, SOLUTION INTRAVENOUS at 15:29

## 2025-05-10 RX ADMIN — OXYBUTYNIN CHLORIDE 5 MG: 5 TABLET ORAL at 23:33

## 2025-05-10 SDOH — SOCIAL STABILITY: SOCIAL INSECURITY
WITHIN THE LAST YEAR, HAVE YOU BEEN RAPED OR FORCED TO HAVE ANY KIND OF SEXUAL ACTIVITY BY YOUR PARTNER OR EX-PARTNER?: NO

## 2025-05-10 SDOH — ECONOMIC STABILITY: HOUSING INSECURITY: IN THE LAST 12 MONTHS, WAS THERE A TIME WHEN YOU WERE NOT ABLE TO PAY THE MORTGAGE OR RENT ON TIME?: NO

## 2025-05-10 SDOH — ECONOMIC STABILITY: FOOD INSECURITY: WITHIN THE PAST 12 MONTHS, YOU WORRIED THAT YOUR FOOD WOULD RUN OUT BEFORE YOU GOT THE MONEY TO BUY MORE.: NEVER TRUE

## 2025-05-10 SDOH — ECONOMIC STABILITY: TRANSPORTATION INSECURITY: IN THE PAST 12 MONTHS, HAS LACK OF TRANSPORTATION KEPT YOU FROM MEDICAL APPOINTMENTS OR FROM GETTING MEDICATIONS?: NO

## 2025-05-10 SDOH — ECONOMIC STABILITY: HOUSING INSECURITY: AT ANY TIME IN THE PAST 12 MONTHS, WERE YOU HOMELESS OR LIVING IN A SHELTER (INCLUDING NOW)?: NO

## 2025-05-10 SDOH — ECONOMIC STABILITY: FOOD INSECURITY: WITHIN THE PAST 12 MONTHS, THE FOOD YOU BOUGHT JUST DIDN'T LAST AND YOU DIDN'T HAVE MONEY TO GET MORE.: NEVER TRUE

## 2025-05-10 SDOH — SOCIAL STABILITY: SOCIAL INSECURITY: DO YOU FEEL ANYONE HAS EXPLOITED OR TAKEN ADVANTAGE OF YOU FINANCIALLY OR OF YOUR PERSONAL PROPERTY?: NO

## 2025-05-10 SDOH — SOCIAL STABILITY: SOCIAL INSECURITY: WITHIN THE LAST YEAR, HAVE YOU BEEN AFRAID OF YOUR PARTNER OR EX-PARTNER?: NO

## 2025-05-10 SDOH — SOCIAL STABILITY: SOCIAL INSECURITY: WITHIN THE LAST YEAR, HAVE YOU BEEN HUMILIATED OR EMOTIONALLY ABUSED IN OTHER WAYS BY YOUR PARTNER OR EX-PARTNER?: NO

## 2025-05-10 SDOH — SOCIAL STABILITY: SOCIAL INSECURITY
WITHIN THE LAST YEAR, HAVE YOU BEEN KICKED, HIT, SLAPPED, OR OTHERWISE PHYSICALLY HURT BY YOUR PARTNER OR EX-PARTNER?: NO

## 2025-05-10 SDOH — ECONOMIC STABILITY: FOOD INSECURITY: HOW HARD IS IT FOR YOU TO PAY FOR THE VERY BASICS LIKE FOOD, HOUSING, MEDICAL CARE, AND HEATING?: NOT VERY HARD

## 2025-05-10 SDOH — ECONOMIC STABILITY: HOUSING INSECURITY: IN THE PAST 12 MONTHS, HOW MANY TIMES HAVE YOU MOVED WHERE YOU WERE LIVING?: 0

## 2025-05-10 SDOH — SOCIAL STABILITY: SOCIAL INSECURITY: ABUSE: ADULT

## 2025-05-10 SDOH — SOCIAL STABILITY: SOCIAL INSECURITY: DOES ANYONE TRY TO KEEP YOU FROM HAVING/CONTACTING OTHER FRIENDS OR DOING THINGS OUTSIDE YOUR HOME?: NO

## 2025-05-10 SDOH — SOCIAL STABILITY: SOCIAL INSECURITY: ARE YOU OR HAVE YOU BEEN THREATENED OR ABUSED PHYSICALLY, EMOTIONALLY, OR SEXUALLY BY ANYONE?: NO

## 2025-05-10 SDOH — SOCIAL STABILITY: SOCIAL INSECURITY: WERE YOU ABLE TO COMPLETE ALL THE BEHAVIORAL HEALTH SCREENINGS?: YES

## 2025-05-10 SDOH — SOCIAL STABILITY: SOCIAL INSECURITY: DO YOU FEEL UNSAFE GOING BACK TO THE PLACE WHERE YOU ARE LIVING?: NO

## 2025-05-10 SDOH — ECONOMIC STABILITY: INCOME INSECURITY: IN THE PAST 12 MONTHS HAS THE ELECTRIC, GAS, OIL, OR WATER COMPANY THREATENED TO SHUT OFF SERVICES IN YOUR HOME?: NO

## 2025-05-10 SDOH — SOCIAL STABILITY: SOCIAL INSECURITY: ARE THERE ANY APPARENT SIGNS OF INJURIES/BEHAVIORS THAT COULD BE RELATED TO ABUSE/NEGLECT?: NO

## 2025-05-10 SDOH — SOCIAL STABILITY: SOCIAL INSECURITY: HAS ANYONE EVER THREATENED TO HURT YOUR FAMILY OR YOUR PETS?: NO

## 2025-05-10 SDOH — SOCIAL STABILITY: SOCIAL INSECURITY: HAVE YOU HAD THOUGHTS OF HARMING ANYONE ELSE?: NO

## 2025-05-10 SDOH — SOCIAL STABILITY: SOCIAL INSECURITY: HAVE YOU HAD ANY THOUGHTS OF HARMING ANYONE ELSE?: NO

## 2025-05-10 ASSESSMENT — ENCOUNTER SYMPTOMS
ABDOMINAL PAIN: 0
JOINT SWELLING: 0
COUGH: 0
NUMBNESS: 0
FEVER: 0
ABDOMINAL DISTENTION: 0
DIZZINESS: 0
HEADACHES: 0
HEMATURIA: 0
PALPITATIONS: 0
NECK PAIN: 1
SHORTNESS OF BREATH: 0
APPETITE CHANGE: 1
RHINORRHEA: 0
FATIGUE: 1
CHILLS: 1
CONSTIPATION: 0
DIARRHEA: 0
DYSURIA: 0
FLANK PAIN: 0
SORE THROAT: 0
BACK PAIN: 0
DIFFICULTY URINATING: 0

## 2025-05-10 ASSESSMENT — ACTIVITIES OF DAILY LIVING (ADL)
LACK_OF_TRANSPORTATION: NO
TOILETING: INDEPENDENT
TOILETING: INDEPENDENT
HEARING - RIGHT EAR: FUNCTIONAL
ADEQUATE_TO_COMPLETE_ADL: YES
PATIENT'S MEMORY ADEQUATE TO SAFELY COMPLETE DAILY ACTIVITIES?: YES
HEARING - LEFT EAR: FUNCTIONAL
LACK_OF_TRANSPORTATION: NO
JUDGMENT_ADEQUATE_SAFELY_COMPLETE_DAILY_ACTIVITIES: YES
JUDGMENT_ADEQUATE_SAFELY_COMPLETE_DAILY_ACTIVITIES: YES
FEEDING YOURSELF: INDEPENDENT
FEEDING YOURSELF: INDEPENDENT
GROOMING: INDEPENDENT
GROOMING: INDEPENDENT
ADEQUATE_TO_COMPLETE_ADL: YES
WALKS IN HOME: INDEPENDENT
HEARING - RIGHT EAR: FUNCTIONAL
HEARING - LEFT EAR: FUNCTIONAL
DRESSING YOURSELF: INDEPENDENT
DRESSING YOURSELF: INDEPENDENT
BATHING: INDEPENDENT
WALKS IN HOME: INDEPENDENT
BATHING: INDEPENDENT

## 2025-05-10 ASSESSMENT — LIFESTYLE VARIABLES
TOTAL SCORE: 0
AUDIT-C TOTAL SCORE: 1
EVER HAD A DRINK FIRST THING IN THE MORNING TO STEADY YOUR NERVES TO GET RID OF A HANGOVER: NO
AUDIT-C TOTAL SCORE: 1
EVER FELT BAD OR GUILTY ABOUT YOUR DRINKING: NO
SKIP TO QUESTIONS 9-10: 1
HAVE YOU EVER FELT YOU SHOULD CUT DOWN ON YOUR DRINKING: NO
HOW OFTEN DO YOU HAVE A DRINK CONTAINING ALCOHOL: MONTHLY OR LESS
HAVE PEOPLE ANNOYED YOU BY CRITICIZING YOUR DRINKING: NO
HOW OFTEN DO YOU HAVE 6 OR MORE DRINKS ON ONE OCCASION: NEVER
HOW MANY STANDARD DRINKS CONTAINING ALCOHOL DO YOU HAVE ON A TYPICAL DAY: 1 OR 2

## 2025-05-10 ASSESSMENT — PAIN SCALES - GENERAL
PAINLEVEL_OUTOF10: 0 - NO PAIN
PAINLEVEL_OUTOF10: 0 - NO PAIN
PAINLEVEL_OUTOF10: 4

## 2025-05-10 ASSESSMENT — COLUMBIA-SUICIDE SEVERITY RATING SCALE - C-SSRS
6. HAVE YOU EVER DONE ANYTHING, STARTED TO DO ANYTHING, OR PREPARED TO DO ANYTHING TO END YOUR LIFE?: NO
1. IN THE PAST MONTH, HAVE YOU WISHED YOU WERE DEAD OR WISHED YOU COULD GO TO SLEEP AND NOT WAKE UP?: NO
2. HAVE YOU ACTUALLY HAD ANY THOUGHTS OF KILLING YOURSELF?: NO

## 2025-05-10 ASSESSMENT — COGNITIVE AND FUNCTIONAL STATUS - GENERAL
DAILY ACTIVITIY SCORE: 24
MOBILITY SCORE: 24
PATIENT BASELINE BEDBOUND: NO

## 2025-05-10 ASSESSMENT — PAIN - FUNCTIONAL ASSESSMENT
PAIN_FUNCTIONAL_ASSESSMENT: 0-10
PAIN_FUNCTIONAL_ASSESSMENT: 0-10

## 2025-05-10 ASSESSMENT — PAIN DESCRIPTION - PROGRESSION: CLINICAL_PROGRESSION: NOT CHANGED

## 2025-05-10 NOTE — H&P
INTERNAL MEDICINE [place] TEAM ADMISSION NOTE    History Of Present Illness  Radha Shabazz is a 69 y.o. female with PMHx of breast cancer (diagnosed 12/21/21 stage IIA ER/SC+, HER2-) S/P R mastectomy, adjuvant chemotherapy, currently on abemaciclib/anastrozole for 2 years (planned completion on 7/2025), history of chemotherapy-induced cardiotoxicity, atrial septal aneurysm, chronic advanced Rt knee osteoarthritis pending replacement, and bilateral renal stones, presenting with generalized fatigue and vomiting with chills for 3 days.    Per patient, she reports that she has 3 days of chills without objective fever, generalized fatigue, and headache that is different from her usual migraines, radiating from her occiput to her bilateral eyes. Reported less appetite. No runny noses, nasal congestion, cough or other URI symptoms. No SOB, chest pain, palpitations, lightheadedness or dizziness. No abdominal discomfort. No vision changes or sensory deficits. She tried taking Tylenol, Motrin since yesterday without significant improvement, which prompt her to visit ED today.     Past Medical History  Medical History[1]    Surgical History  Surgical History[2]     Social History  She reports that she has never smoked. She has never used smokeless tobacco. No history on file for alcohol use and drug use.    Family History  Family History[3]     Allergies  Iodine, Iodinated contrast media, and Sulfa (sulfonamide antibiotics)    Review of Systems   Constitutional:  Positive for appetite change, chills and fatigue. Negative for fever.   HENT:  Negative for ear pain, nosebleeds, postnasal drip, rhinorrhea and sore throat.    Respiratory:  Negative for cough and shortness of breath.    Cardiovascular:  Negative for chest pain, palpitations and leg swelling.   Gastrointestinal:  Negative for abdominal distention, abdominal pain, constipation and diarrhea.   Genitourinary:  Negative for difficulty urinating, dysuria, flank pain and  "hematuria.   Musculoskeletal:  Positive for neck pain. Negative for back pain and joint swelling.   Neurological:  Negative for dizziness, numbness and headaches.        Physical Exam  Constitutional:       Appearance: Normal appearance.   Cardiovascular:      Rate and Rhythm: Normal rate.      Pulses: Normal pulses.      Heart sounds: No murmur heard.  Pulmonary:      Effort: Pulmonary effort is normal. No respiratory distress.      Breath sounds: Normal breath sounds.   Abdominal:      General: Abdomen is flat. There is no distension.      Tenderness: There is no abdominal tenderness.   Musculoskeletal:      Right lower leg: No edema.      Left lower leg: No edema.   Skin:     General: Skin is warm.      Capillary Refill: Capillary refill takes less than 2 seconds.      Coloration: Skin is not jaundiced or pale.   Neurological:      General: No focal deficit present.      Mental Status: She is alert and oriented to person, place, and time.          Last Recorded Vitals  Blood pressure 105/62, pulse 82, temperature 36.6 °C (97.8 °F), temperature source Temporal, resp. rate 17, height 1.676 m (5' 6\"), weight 88.5 kg (195 lb), SpO2 98%.    Relevant Results  CT head wo IV contrast  Result Date: 5/10/2025  Interpreted By:  Angelo Marques and Hooper Grayson STUDY: [id number] CT HEAD WO IV CONTRAST   INDICATION: Signs/Symptoms:eval for acute onset n/v in the setting of cancer hx   COMPARISON: None.   ACCESSION NUMBER(S): [id number]   ORDERING CLINICIAN: BIA MOLINA   TECHNIQUE: Noncontrast helical CT of the head was performed. Multiplanar reformations in bone and soft tissue algorithm were provided.   FINDINGS: There is no CT evidence of acute loss of gray-white differentiation. Mild physiologic mineralization of the globus pallidus is noted, a common normal finding for a patient of this age. No evidence for a mass or for vasogenic edema.   No evidence of acute intracranial hemorrhage.   No intracranial mass " or mass effect.   No midline shift or herniation.   No ventriculomegaly. Normal appearance of the basilar cisterns.   Normal CT appearances of the paranasal sinuses. Status post bilateral lens extractions. No mastoid effusion. No acute or aggressive appearing calvarial lesion.   No evidence for acute dense thrombus on this study. Mild calcific atherosclerosis demonstrated along cavernous segments of bilateral internal carotid arteries. Punctate calcification adjacent to the basilar artery is noted but favored to be instead ossification of the petroclival ligament, rather than basilar atherosclerosis.   Within the scalp there are a few tiny well-circumscribed subcutaneous nodules with no aggressive imaging characteristics. They have fairly uniform attenuation. They look fairly typical for benign sebaceous cysts.       1. There is no CT evidence of acute intracranial hemorrhage or other acute findings. 2. Within the limitations of noncontrast head CT imaging, no findings to suggest metastatic disease. I would let the clinical picture determine the need for follow-up brain MR imaging with and without IV gadolinium contrast.   I personally reviewed the images/study and I agree with the findings as stated.   Signed by: Angelo Marques 5/10/2025 4:38 PM Dictation workstation:   [id number]    XR chest 1 view  Result Date: 5/10/2025  STUDY: Chest Radiograph;  05/10/2025 at 14:22 hours. INDICATION: Chest pain. COMPARISON: CT Chest 02/15/2023.  XR Chest 01/27/2023. ACCESSION NUMBER(S): [id number] ORDERING CLINICIAN: BIA MOLINA TECHNIQUE:  Frontal chest was obtained at 14:22 hours. FINDINGS: Left port is in place with its tip in the right atrium. CARDIOMEDIASTINAL SILHOUETTE: Cardiomediastinal silhouette is normal in size and configuration.  LUNGS: Lungs are clear.  ABDOMEN: No remarkable upper abdominal findings.  BONES: No acute osseous changes.    No acute pulmonary pathology. Signed by Dionte Blair MD      Results for orders placed or performed during the hospital encounter of 05/10/25 (from the past 24 hours)   CBC and Auto Differential   Result Value Ref Range    WBC 2.3 (L) 4.4 - 11.3 x10*3/uL    nRBC 0.0 0.0 - 0.0 /100 WBCs    RBC 4.50 4.00 - 5.20 x10*6/uL    Hemoglobin 15.3 12.0 - 16.0 g/dL    Hematocrit 43.2 36.0 - 46.0 %    MCV 96 80 - 100 fL    MCH 34.0 26.0 - 34.0 pg    MCHC 35.4 32.0 - 36.0 g/dL    RDW 12.1 11.5 - 14.5 %    Platelets 111 (L) 150 - 450 x10*3/uL    Neutrophils % 75.0 40.0 - 80.0 %    Immature Granulocytes %, Automated 0.9 0.0 - 0.9 %    Lymphocytes % 11.6 13.0 - 44.0 %    Monocytes % 11.6 2.0 - 10.0 %    Eosinophils % 0.0 0.0 - 6.0 %    Basophils % 0.9 0.0 - 2.0 %    Neutrophils Absolute 1.69 1.20 - 7.70 x10*3/uL    Immature Granulocytes Absolute, Automated 0.02 0.00 - 0.70 x10*3/uL    Lymphocytes Absolute 0.26 (L) 1.20 - 4.80 x10*3/uL    Monocytes Absolute 0.26 0.10 - 1.00 x10*3/uL    Eosinophils Absolute 0.00 0.00 - 0.70 x10*3/uL    Basophils Absolute 0.02 0.00 - 0.10 x10*3/uL   Magnesium   Result Value Ref Range    Magnesium 2.11 1.60 - 2.40 mg/dL   Comprehensive metabolic panel   Result Value Ref Range    Glucose 106 (H) 74 - 99 mg/dL    Sodium 134 (L) 136 - 145 mmol/L    Potassium 3.9 3.5 - 5.3 mmol/L    Chloride 100 98 - 107 mmol/L    Bicarbonate 24 21 - 32 mmol/L    Anion Gap 14 10 - 20 mmol/L    Urea Nitrogen 15 6 - 23 mg/dL    Creatinine 1.19 (H) 0.50 - 1.05 mg/dL    eGFR 50 (L) >60 mL/min/1.73m*2    Calcium 10.6 8.6 - 10.6 mg/dL    Albumin 4.4 3.4 - 5.0 g/dL    Alkaline Phosphatase 49 33 - 136 U/L    Total Protein 7.7 6.4 - 8.2 g/dL    AST 19 9 - 39 U/L    Bilirubin, Total 1.0 0.0 - 1.2 mg/dL    ALT 15 7 - 45 U/L   Lipase   Result Value Ref Range    Lipase 15 9 - 82 U/L   Troponin I, High Sensitivity   Result Value Ref Range    Troponin I, High Sensitivity (CMC) 9 0 - 34 ng/L   B-Type Natriuretic Peptide   Result Value Ref Range    BNP 25 0 - 99 pg/mL   Creatine Kinase   Result  Value Ref Range    Creatine Kinase 246 (H) 0 - 215 U/L   Morphology   Result Value Ref Range    RBC Morphology See Below     RBC Fragments Few     Ovalocytes Few    Blood Culture    Specimen: Peripheral Venipuncture; Blood culture   Result Value Ref Range    Blood Culture Loaded on Instrument - Culture in progress    Urinalysis with Reflex Microscopic   Result Value Ref Range    Color, Urine Light-Yellow Light-Yellow, Yellow, Dark-Yellow    Appearance, Urine Turbid (N) Clear    Specific Gravity, Urine 1.012 1.005 - 1.035    pH, Urine 6.5 5.0, 5.5, 6.0, 6.5, 7.0, 7.5, 8.0    Protein, Urine 30 (1+) (A) NEGATIVE, 10 (TRACE), 20 (TRACE) mg/dL    Glucose, Urine Normal Normal mg/dL    Blood, Urine 0.1 (1+) (A) NEGATIVE mg/dL    Ketones, Urine NEGATIVE NEGATIVE mg/dL    Bilirubin, Urine NEGATIVE NEGATIVE mg/dL    Urobilinogen, Urine Normal Normal mg/dL    Nitrite, Urine 2+ (A) NEGATIVE    Leukocyte Esterase, Urine 500 Les/uL (A) NEGATIVE   Microscopic Only, Urine   Result Value Ref Range    WBC, Urine >50 (A) 1-5, NONE /HPF    RBC, Urine 11-20 (A) NONE, 1-2, 3-5 /HPF    Squamous Epithelial Cells, Urine 1-9 (SPARSE) Reference range not established. /HPF    Budding Yeast, Urine PRESENT (A) NONE /HPF    Mucus, Urine FEW Reference range not established. /LPF   Sodium, Urine Random   Result Value Ref Range    Sodium, Urine Random 86 mmol/L    Creatinine, Urine Random 79.1 20.0 - 320.0 mg/dL    Sodium/Creatinine Ratio 109 Not established. mmol/g Creat      Medications ordered prior to the current encounter[4]     Assessment & Plan  Radha Shabazz is a 69 y.o. female with PMHx of breast cancer S/P R mastectomy, adjuvant chemotherapy, currently on abemaciclib/anastrozole for 2 years (planned completion on 7/2025), history of chemotherapy-induced cardiotoxicity, advanced Rt knee osteoarthritis pending replacement, and bilateral renal stones, presenting with generalized fatigue and vomiting with chills for 3 days associated with  headache. Tried home pain medications without significant improvement. CBC shows leukopenia of 2.3 (at baseline) with low plt 111. Na 134, Cr 1.19. Chest X-ray negative. CT head negative for intracranial process.     # Headache with vomiting, non-specific  # Fatigue  # Mild hyponatremia  :: Patient had non-specific fatigue with chills without objective fever along with reduced appetite for the past 3 days, N/V  :: Appears dry on exam  :: Lab showing mild hyponatremia of 134, CBC unremarkable change  :: UA pyuria (with possible contamination given squamous cells in urine), no symptoms  Plan  - symptomatic treatment: pain control  with Tylenol/Topomax, can trial toradol  - zofran prn for N/V  - will give 500 mL of LR, encourage oral intake  - pending blood culture  - If fever present, consider start empiric Abx for UTI (will hold off for now that patient has no urinary symptoms)  - Will follow urine Osm/Na, RFP for hyponatremia    # PMHx of breast cancer S/P R mastectomy, adjuvant chemotherapy,   currently on abemaciclib/anastrozole for 2 years (planned completion on 7/2025)  # History of chemotherapy-induced cardiotoxicity  :: Patient not having SOB or symptoms suspicious of HF  Plan   - hold arimidex while admitted inpatient    #Thrombocytopenia (baseline normal; admit 111)  Unclear. Other cell lines stable (ANC >500), will continue to monitor    #Chronic advanced Rt knee osteoarthritis pending replacement  - Hold ibuprofen while admitted  - Will adjust pain meds as needed    #Hyperlipidemia  -continue crestor 20 mg    #History of overactive bladder  - continue previous meds: oxybutynin 5 mg TID  - vesicare held inpatient    F: replete prn  E: replete prn, keep K>4, Mg>2  N: regular diet  A: PIVx1    DVT ppx: lovenox 40 mg subcutaneous daily  GI ppx: not indicated  Bowel regimen: miralax  O2 None    Comfort care  NOK: Karri Shabazz (Spouse) [phone] (Work Phone)     Ryan Robert MD  PGY-1, Internal  Medicine/Medical Genetics       [1]   Past Medical History:  Diagnosis Date    Breast cancer     Encounter for gynecological examination (general) (routine) without abnormal findings     Encounter for gynecological examination without abnormal finding    Encounter for screening mammogram for malignant neoplasm of breast     Visit for screening mammogram    Personal history of irradiation     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    Personal history of other diseases of the circulatory system     History of hypertension   [2]   Past Surgical History:  Procedure Laterality Date    APPENDECTOMY  10/05/2015    Appendectomy    HYSTERECTOMY  10/05/2015    Supracervical Hysterectomy    MASTECTOMY Right 2022    OTHER SURGICAL HISTORY  06/27/2022    Tongue surgery    OTHER SURGICAL HISTORY  06/27/2022    Neck dissection modified radical    OTHER SURGICAL HISTORY  08/04/2020    Split thickness skin graft    TOTAL ABDOMINAL HYSTERECTOMY W/ BILATERAL SALPINGOOPHORECTOMY  10/05/2015    Salpingo-oophorectomy Bilateral   [3]   Family History  Problem Relation Name Age of Onset    Aortic stenosis Mother      Diabetes Father      Stomach cancer Father      Colon cancer Father's Brother      Cancer Other     [4]   Current Facility-Administered Medications   Medication Dose Route Frequency Provider Last Rate Last Admin    acetaminophen (Tylenol) tablet 975 mg  975 mg oral TID PRN Kory Luo MD        [Held by provider] anastrozole (Arimidex) tablet 1 mg  1 mg oral Daily Kory Luo MD        enoxaparin (Lovenox) syringe 40 mg  40 mg subcutaneous q24h Kory Luo MD        escitalopram (Lexapro) tablet 5 mg  5 mg oral Daily Kory Luo MD        lactated Ringer's infusion 500 mL  500 mL intravenous Continuous Ryan Robert MD        ondansetron (Zofran) injection 4 mg  4 mg intravenous q8h PRN Ryan Robert MD        oxyBUTYnin  (Ditropan) tablet 5 mg  5 mg oral TID Kory Luo MD        [START ON 5/11/2025] pantoprazole (ProtoNix) EC tablet 40 mg  40 mg oral Daily before breakfast Kory Luo MD        polyethylene glycol (Glycolax, Miralax) packet 17 g  17 g oral Daily PRN Kory Luo MD        rosuvastatin (Crestor) tablet 20 mg  20 mg oral Nightly Kory Luo MD        topiramate (Topamax) tablet 25 mg  25 mg oral BID PRN Kory Luo MD         Current Outpatient Medications   Medication Sig Dispense Refill    anastrozole (Arimidex) 1 mg tablet Take 1 tablet (1 mg total) by mouth once daily.  Swallow whole with a drink of water. 90 tablet 3    anastrozole (Arimidex) 1 mg tablet Take 1 tablet (1 mg total) by mouth once daily.  Swallow whole with a drink of water. 90 tablet 1    escitalopram (Lexapro) 5 mg tablet Take 1 tablet (5 mg) by mouth once daily.      nitrofurantoin, macrocrystal-monohydrate, (Macrobid) 100 mg capsule Take 1 capsule (100 mg) by mouth every 12 hours. 14 capsule 0    omeprazole (PriLOSEC) 20 mg DR capsule Take 1 capsule (20 mg) by mouth once daily.      rosuvastatin (Crestor) 20 mg tablet Take 1 tablet (20 mg) by mouth once daily. 90 tablet 3    sodium hyaluronate (Durolane) 60 mg/3 mL injection Inject one syringe(60mg/3mL) into right knee one time at doctors office 3 mL 0    solifenacin (Vesicare) 10 mg tablet Take 1 tablet (10 mg) by mouth once daily. Swallow tablet whole; do not crush, chew, or split. 30 tablet 2    topiramate (Topamax) 25 mg tablet Take 1 tablet (25 mg) by mouth once daily as needed.      UNABLE TO FIND Breast prosthesis for left and/or right breast      UNABLE TO FIND Mastectomy Bra for Right Mastectomy

## 2025-05-10 NOTE — SIGNIFICANT EVENT
Subjective   Please refer to excellent Intern H&P for comprehensive note entailing our detailed discussions.    Chief concern:   Chief Complaint   Patient presents with    Chills       History of present illness:  Radha Shabazz is a 69 y.o. femalewith PMHX notable for R Breast Cancer (ER+, [place]+ HER -) s/p R mastectomy, currently on abemaciclib/anastrozole, prior chemotherapy-induced cardiotoxicity, hx of SCC of tongue s/p partial resection, chronic R knee OA pending replacement, depression, and Migraines.    Presents following 3 days of chills (first presenting symptom), malaise, and multiple episodes of emesis. Additionally, she reports a new character headache/migraine starting later the same day; describing pain starting that the base of the skull then wrapping over the cranially to the eyes. Per the  she has been tolerating liquids but difficulty with solids since Thursday. She denies any vision/perception changes, cardiopulmonary symptoms, cough/cold sxs/rhinorrhea, GI upset/diarrhea, urinary dysuria/burning/pruritus. Denies any sick contacts or recent changes in the home.       ED course:   Vital signs:  ED Triage Vitals [05/10/25 1324]   Temperature Heart Rate Respirations BP   36.6 °C (97.8 °F) 85 16 116/81      Pulse Ox Temp Source Heart Rate Source Patient Position   98 % Temporal -- --      BP Location FiO2 (%)     -- --          ECG:   No results found for this or any previous visit (from the past 4464 hours).    Labs:   Trop: 9  BNP: 25  :   Last CBC:  Lab Results   Component Value Date    WBC 2.3 (L) 05/10/2025    HGB 15.3 05/10/2025    HCT 43.2 05/10/2025    MCV 96 05/10/2025     (L) 05/10/2025       Last RFP:  Lab Results   Component Value Date    GLUCOSE 106 (H) 05/10/2025    CALCIUM 10.6 05/10/2025     (L) 05/10/2025    K 3.9 05/10/2025    CO2 24 05/10/2025     05/10/2025    BUN 15 05/10/2025    CREATININE 1.19 (H) 05/10/2025       Last LFTs:  Lab Results   Component  Value Date    ALT 15 05/10/2025    AST 19 05/10/2025    ALKPHOS 49 05/10/2025    BILITOT 1.0 05/10/2025       Last coags:  Lab Results   Component Value Date    INR 1.2 (H) 01/20/2023    APTT 23 (L) 09/17/2022             Imaging:    === 05/10/25 ===    CT HEAD WO IV CONTRAST    - Impression -  1. There is no CT evidence of acute intracranial hemorrhage or other  acute findings.  2. Within the limitations of noncontrast head CT imaging, no findings  to suggest metastatic disease. I would let the clinical picture  determine the need for follow-up brain MR imaging with and without IV  gadolinium contrast.    === 05/10/25 ===    XR CHEST 1 VIEW    - Impression -  No acute pulmonary pathology.  Signed by Dionte Blair MD     Micro/culture data:  No results found for the last 90 days.        ED Course/Interventions:  Diagnoses as of 05/10/25 2053   Emesis        Medications   acetaminophen (Tylenol) tablet 975 mg (has no administration in time range)   polyethylene glycol (Glycolax, Miralax) packet 17 g (has no administration in time range)   anastrozole (Arimidex) tablet 1 mg ( oral Dose Auto Held 5/14/25 0900)   escitalopram (Lexapro) tablet 5 mg (has no administration in time range)   pantoprazole (ProtoNix) EC tablet 40 mg (has no administration in time range)   rosuvastatin (Crestor) tablet 20 mg (has no administration in time range)   oxyBUTYnin (Ditropan) tablet 5 mg (has no administration in time range)   enoxaparin (Lovenox) syringe 40 mg (has no administration in time range)   ondansetron (Zofran) injection 4 mg (has no administration in time range)   topiramate (Topamax) tablet 25 mg (has no administration in time range)   lactated Ringer's infusion 500 mL (has no administration in time range)   sodium chloride 0.9 % bolus 1,000 mL (0 mL intravenous Stopped 5/10/25 1649)   metoclopramide (Reglan) injection 10 mg (10 mg intravenous Given 5/10/25 1529)   ketorolac (Toradol) injection 15 mg (15 mg intravenous  Given 5/10/25 3587)   magnesium sulfate 1 g in dextrose 5%  mL (0 g intravenous Stopped 5/10/25 1909)            Relevant Imaging/Reports:  TTE 4/17/2025:  CONCLUSIONS:   1. Left ventricular ejection fraction is low normal, calculated by 3D at 55%.   2. Left Ventricular Global Longitudinal Strain - 19.0 %.   3. Mild aortic valve regurgitation.      PHYSICIAN INTERPRETATION:  Left Ventricle: Left ventricular ejection fraction is low normal, calculated by 3D at 55%. There are no regional left ventricular wall motion abnormalities. The left ventricular cavity size is normal. There is mildly increased septal and mildly increased posterior left ventricular wall thickness. There is left ventricular concentric remodeling. Left Ventricular Global Longitudinal Strain - 19.0 %. Left ventricular diastolic filling was not assessed with normal left atrial filling pressure.  Left Atrium: The left atrial size is normal.  Right Ventricle: The right ventricle was not well visualized. Right ventricular systolic function not assessed.  Right Atrium: The right atrial size was not well visualized.  Aortic Valve: The aortic valve is trileaflet. There is mild aortic valve regurgitation. The peak instantaneous gradient of the aortic valve is 6 mmHg. The mean gradient of the aortic valve is 3 mmHg.  Mitral Valve: The mitral valve is mildly thickened. There is trace mitral valve regurgitation.  Tricuspid Valve: The tricuspid valve is structurally normal. There is trace tricuspid regurgitation.  Pulmonic Valve: The pulmonic valve is structurally normal. Pulmonic valve regurgitation was not assessed.  Pericardium: There is no pericardial effusion noted.  Aorta: The aortic root was not assessed.  In comparison to the previous echocardiogram(s): Compared with study dated 10/17/2024, no significant change.      Past Medical History:  She has a past medical history of Breast cancer, Encounter for gynecological examination (general) (routine)  without abnormal findings, Encounter for screening mammogram for malignant neoplasm of breast, Personal history of irradiation, Personal history of malignant neoplasm of tongue (12/19/2022), and Personal history of other diseases of the circulatory system.    Past Surgical History:  She has a past surgical history that includes Other surgical history (06/27/2022); Other surgical history (06/27/2022); Other surgical history (08/04/2020); Total abdominal hysterectomy w/ bilateral salpingoophorectomy (10/05/2015); Appendectomy (10/05/2015); Hysterectomy (10/05/2015); and Mastectomy (Right, 2022).       Family history:  Family History[1]     Allergies:  Iodine, Iodinated contrast media, and Sulfa (sulfonamide antibiotics)    Home medications:  Prior to Admission medications    Medication Sig Start Date End Date Taking? Authorizing Provider   anastrozole (Arimidex) 1 mg tablet Take 1 tablet (1 mg total) by mouth once daily.  Swallow whole with a drink of water. 1/12/24   WALTER Rubin   anastrozole (Arimidex) 1 mg tablet Take 1 tablet (1 mg total) by mouth once daily.  Swallow whole with a drink of water. 1/20/25   Anna Olson MD   escitalopram (Lexapro) 5 mg tablet Take 1 tablet (5 mg) by mouth once daily.    Historical Provider, MD   nitrofurantoin, macrocrystal-monohydrate, (Macrobid) 100 mg capsule Take 1 capsule (100 mg) by mouth every 12 hours. 6/14/24   Anna Olson MD   omeprazole (PriLOSEC) 20 mg DR capsule Take 1 capsule (20 mg) by mouth once daily.    Historical Provider, MD   rosuvastatin (Crestor) 20 mg tablet Take 1 tablet (20 mg) by mouth once daily. 4/17/25 4/17/26  WALTER Flores   sodium hyaluronate (Durolane) 60 mg/3 mL injection Inject one syringe(60mg/3mL) into right knee one time at doctors office 8/2/24   Venita Yañez MD   solifenacin (Vesicare) 10 mg tablet Take 1 tablet (10 mg) by mouth once daily. Swallow tablet whole; do not crush, chew, or split. 4/21/25    "Hetal Blackman, APRN-CNP   topiramate (Topamax) 25 mg tablet Take 1 tablet (25 mg) by mouth once daily as needed. 1/23/18   Historical Provider, MD   UNABLE TO FIND Breast prosthesis for left and/or right breast 10/24/22   Historical Provider, MD   UNABLE TO FIND Mastectomy Bra for Right Mastectomy 10/24/22   Historical Provider, MD          Objective   Vital signs:  Blood pressure 105/62, pulse 82, temperature 36.6 °C (97.8 °F), temperature source Temporal, resp. rate 17, height 1.676 m (5' 6\"), weight 88.5 kg (195 lb), SpO2 98%.       Assessment/Plan   Radha Shabazz is a 69 y.o. female with PMHX notable for R Breast Cancer (ER+, [place]+ HER -) s/p R mastectomy, currently on abemaciclib/anastrozole, prior chemotherapy-induced cardiotoxicity, hx of SCC of tongue s/p partial resection, chronic R knee OA pending replacement, depression, and Migraines. Here with non-specific symptoms and concern for underlying pathology given she is currently undergoing cancer treatment. Reports her first symptom was chills followed by malaise with emesis/dry heaving all iso a new character migraine/headache unreleased by her typical home cocktail Excedrin and tylenol. Appears mildly dry on exam which may be contributing the her presentation.    #Headache with emesis  #Fatigue  #Mild Hyponatremia  Mild decrease compared to recent baseline (admit 134)  CT Head Non-contrast w/o mass/swelling or shift  Appears dry on exam  -Encourage PO intake, LR bolus in addition   -Fu BCtx  -Fu Urine Na/Osm  -Zofran PRN  -Tylenol/Topomax for now; can trial Toradol       -If no resolution can consider MRI w/ and w/o for further evaluation of intracranial lesions.    #R Breast Cancer (ER+, [place]+ HER -)  S/p R mastectomy, currently on abemaciclib/anastrozole; also has hx of cardiotoxicity 2/2 chemotherapy    #Thrombocytopenia (baseline normal; admit 111)  Unclear. Other cell lines stable (ANC >500)  -CTM      Chronic Medical Conditions:  Depression, GERD, " Severe R Knee OA; Continue home meds and supportive treatment      N: Regular Diet  DVT Ppx: Lovenox   Access/Lines: PIV / Midline / Mediport (date placed)  Abx: None   O2: None     Code status: DNR and No Intubation  Emergency contact: Karri Shabazz (spouse) [phone]           To be formally staffed in the morning with attending.    Kory Luo MD  Internal Medicine         [1]   Family History  Problem Relation Name Age of Onset    Aortic stenosis Mother      Diabetes Father      Stomach cancer Father      Colon cancer Father's Brother      Cancer Other

## 2025-05-10 NOTE — TELEPHONE ENCOUNTER
Patient advised per fellow that she should be evaluated. Patient states she will come to [facility] ED for evaluation.

## 2025-05-10 NOTE — ED TRIAGE NOTES
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 Says no fever at home.

## 2025-05-10 NOTE — TELEPHONE ENCOUNTER
Patient states got home from work Thursday and felt chilled, headache, nausea and vomiting. No fever. Has been occurring on and off since. States this has happened a few times throughout the course of treatment but never lasted this long. States headache currently 8/10, tylenol and caffeine help some. Has thrown up 3x in last 24hrs. Message sent to fellow on call.

## 2025-05-10 NOTE — ED PROVIDER NOTES
CC: Chills     HPI:   Patient is a 69-year-old female with past medical history of breast cancer on abemaciclib/anastrozole for the past 2 years, cardiotoxicity from prior chemotherapy, chronic right knee pain pending replacement presenting due to concerns of 3 days of chills, generalized fatigue as well as 4 episodes of emesis.  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  Patient reports that in the past she has had episodes of generalized fatigue and chills however it usually resolves in 1 day.  She has associated headache that is different from her usual migraines where she now has pain rating from her occiput to her bilateral eyes.  No vision changes, sensory deficits, syncopal episodes or focal neurologic deficits.  She has been taking a statin recently after seeing a cardio oncologist without any missed doses.  She denies any hematuria or dysuria but does report she has been going to the bathroom more frequently but has been more hydrated and drinking fluids frequently.  She has been trying to take Tylenol Motrin yesterday without significant improvement of symptoms and denies any fevers reported.    Limitations to History: none  Additional History Obtained from:     PMHx/PSHx:  Per HPI.   - has a past medical history of Breast cancer, Encounter for gynecological examination (general) (routine) without abnormal findings, Encounter for screening mammogram for malignant neoplasm of breast, Personal history of irradiation, Personal history of malignant neoplasm of tongue (12/19/2022), and Personal history of other diseases of the circulatory system.  - has a past surgical history that includes Other surgical history (06/27/2022); Other surgical history (06/27/2022); Other surgical history (08/04/2020); Total abdominal hysterectomy w/ bilateral salpingoophorectomy (10/05/2015); Appendectomy (10/05/2015); Hysterectomy (10/05/2015); and Mastectomy  (Right, 2022).    Social History:  - Tobacco:  reports that she has never smoked. She has never used smokeless tobacco.   - Alcohol:  has no history on file for alcohol use.   - Drugs:  has no history on file for drug use.     Medications: Reviewed in EMR.     Allergies:  Iodine, Iodinated contrast media, and Sulfa (sulfonamide antibiotics)    ???????????????????????????????????????????????????????????????  Triage Vitals:  T 36.6 °C (97.8 °F)  HR 85  /81  RR 16  O2 98 % None (Room air)    Physical Exam  Vitals and nursing note reviewed.   Constitutional:       General: She is not in acute distress.     Appearance: She is well-developed.   HENT:      Head: Normocephalic and atraumatic.   Eyes:      Conjunctiva/sclera: Conjunctivae normal.   Cardiovascular:      Rate and Rhythm: Normal rate and regular rhythm.      Heart sounds: No murmur heard.  Pulmonary:      Effort: Pulmonary effort is normal. No respiratory distress.      Breath sounds: Normal breath sounds.   Abdominal:      Palpations: Abdomen is soft.      Tenderness: There is no abdominal tenderness.   Musculoskeletal:         General: No swelling.      Cervical back: Neck supple.   Skin:     General: Skin is warm and dry.      Capillary Refill: Capillary refill takes less than 2 seconds.   Neurological:      Mental Status: She is alert.   Psychiatric:         Mood and Affect: Mood normal.       ???????????????????????????????????????????????????????????????  EKG (per my interpretation):  none    ED Course       Medical Decision Making:  Patient is a 69-year-old female with past medical history of breast cancer on abemaciclib/anastrozole for the past 2 years, cardiotoxicity from prior chemotherapy, chronic right knee pain pending replacement presenting due to concerns of 3 days of chills, generalized fatigue as well as 4 episodes of emesis.  Differentials considered but not limited to neutropenia, electrolyte abnormality, new metastases to the  brain, symptomatic anemia, viral illness, gastritis, ACS, myositis.    Based on patient's history and physical exam broad lab work was obtained in addition to blood cultures.  Patient CK is mildly elevated at 246.  CT head is still pending for the patient.  Discussed at length with the patient coming in for symptom management versus waiting at home for blood cultures to result.  Will reevaluate patient's disposition after blood work and CT scans. Patient CT scans were negative for any acute abnormality and creatinine and GFR improved compared to prior lab work.  Patient's BNP, troponin are stable.  Patient has been persistently leukopenic around 2.3-2.7.  No acute anemia noted.  Blood cultures are in progress and CT scan does not show signs of metastatic disease.  However this is not the most sensitive test and will admit for ongoing headache that was refractory to initial migraine cocktail.  Will continue symptomatic control attempts and was admitted to oncology for blood cultures to rule grow in for MRI.  Care was overseen by attending physician agrees with the plan disposition.    External records reviewed: recent inpatient, clinic, and prior ED notes  Diagnostic imaging independently reviewed/interpreted by me (as reflected in MDM) includes: CT head  Social Determinants Affecting Care: None identified  Discussion of management with other providers: attending  Prescription Drug Consideration: none  Escalation of Care: admitted    Impression:   Chill  Nausea and Vomiting    Disposition: Admitted      Procedures ? SmartLinks last updated 5/10/2025 3:46 PM        Jannette Sherwood MD  Resident  05/10/25 6646

## 2025-05-11 VITALS
BODY MASS INDEX: 31.34 KG/M2 | OXYGEN SATURATION: 95 % | DIASTOLIC BLOOD PRESSURE: 62 MMHG | WEIGHT: 195 LBS | SYSTOLIC BLOOD PRESSURE: 95 MMHG | HEART RATE: 64 BPM | TEMPERATURE: 98.8 F | RESPIRATION RATE: 18 BRPM | HEIGHT: 66 IN

## 2025-05-11 LAB
ALBUMIN SERPL BCP-MCNC: 3.5 G/DL (ref 3.4–5)
ANION GAP SERPL CALC-SCNC: 14 MMOL/L (ref 10–20)
BACTERIA BLD CULT: NORMAL
BACTERIA BLD CULT: NORMAL
BASOPHILS # BLD AUTO: 0.01 X10*3/UL (ref 0–0.1)
BASOPHILS NFR BLD AUTO: 0.5 %
BUN SERPL-MCNC: 14 MG/DL (ref 6–23)
CALCIUM SERPL-MCNC: 8.9 MG/DL (ref 8.6–10.6)
CHLORIDE SERPL-SCNC: 105 MMOL/L (ref 98–107)
CO2 SERPL-SCNC: 22 MMOL/L (ref 21–32)
CREAT SERPL-MCNC: 1.14 MG/DL (ref 0.5–1.05)
EGFRCR SERPLBLD CKD-EPI 2021: 53 ML/MIN/1.73M*2
EOSINOPHIL # BLD AUTO: 0.05 X10*3/UL (ref 0–0.7)
EOSINOPHIL NFR BLD AUTO: 2.4 %
ERYTHROCYTE [DISTWIDTH] IN BLOOD BY AUTOMATED COUNT: 12 % (ref 11.5–14.5)
GLUCOSE SERPL-MCNC: 119 MG/DL (ref 74–99)
HCT VFR BLD AUTO: 31 % (ref 36–46)
HGB BLD-MCNC: 11 G/DL (ref 12–16)
IMM GRANULOCYTES # BLD AUTO: 0.01 X10*3/UL (ref 0–0.7)
IMM GRANULOCYTES NFR BLD AUTO: 0.5 % (ref 0–0.9)
LYMPHOCYTES # BLD AUTO: 0.26 X10*3/UL (ref 1.2–4.8)
LYMPHOCYTES NFR BLD AUTO: 12.7 %
MAGNESIUM SERPL-MCNC: 2.3 MG/DL (ref 1.6–2.4)
MCH RBC QN AUTO: 34.2 PG (ref 26–34)
MCHC RBC AUTO-ENTMCNC: 35.5 G/DL (ref 32–36)
MCV RBC AUTO: 96 FL (ref 80–100)
MONOCYTES # BLD AUTO: 0.25 X10*3/UL (ref 0.1–1)
MONOCYTES NFR BLD AUTO: 12.2 %
NEUTROPHILS # BLD AUTO: 1.47 X10*3/UL (ref 1.2–7.7)
NEUTROPHILS NFR BLD AUTO: 71.7 %
NRBC BLD-RTO: 0 /100 WBCS (ref 0–0)
PHOSPHATE SERPL-MCNC: 1.3 MG/DL (ref 2.5–4.9)
PLATELET # BLD AUTO: 90 X10*3/UL (ref 150–450)
POTASSIUM SERPL-SCNC: 3.8 MMOL/L (ref 3.5–5.3)
RBC # BLD AUTO: 3.22 X10*6/UL (ref 4–5.2)
SODIUM SERPL-SCNC: 137 MMOL/L (ref 136–145)
WBC # BLD AUTO: 2.1 X10*3/UL (ref 4.4–11.3)

## 2025-05-11 PROCEDURE — 2500000002 HC RX 250 W HCPCS SELF ADMINISTERED DRUGS (ALT 637 FOR MEDICARE OP, ALT 636 FOR OP/ED)

## 2025-05-11 PROCEDURE — 1170000001 HC PRIVATE ONCOLOGY ROOM DAILY

## 2025-05-11 PROCEDURE — 83735 ASSAY OF MAGNESIUM: CPT

## 2025-05-11 PROCEDURE — 2500000004 HC RX 250 GENERAL PHARMACY W/ HCPCS (ALT 636 FOR OP/ED): Mod: JZ

## 2025-05-11 PROCEDURE — 84520 ASSAY OF UREA NITROGEN: CPT

## 2025-05-11 PROCEDURE — 2500000001 HC RX 250 WO HCPCS SELF ADMINISTERED DRUGS (ALT 637 FOR MEDICARE OP)

## 2025-05-11 PROCEDURE — 99233 SBSQ HOSP IP/OBS HIGH 50: CPT

## 2025-05-11 PROCEDURE — 85025 COMPLETE CBC W/AUTO DIFF WBC: CPT

## 2025-05-11 RX ORDER — CEFTRIAXONE 1 G/50ML
1 INJECTION, SOLUTION INTRAVENOUS EVERY 24 HOURS
Status: DISCONTINUED | OUTPATIENT
Start: 2025-05-11 | End: 2025-05-13 | Stop reason: HOSPADM

## 2025-05-11 RX ADMIN — ESCITALOPRAM OXALATE 5 MG: 10 TABLET ORAL at 21:16

## 2025-05-11 RX ADMIN — ONDANSETRON 4 MG: 2 INJECTION INTRAMUSCULAR; INTRAVENOUS at 14:33

## 2025-05-11 RX ADMIN — ACETAMINOPHEN 975 MG: 325 TABLET, FILM COATED ORAL at 06:36

## 2025-05-11 RX ADMIN — ACETAMINOPHEN 975 MG: 325 TABLET, FILM COATED ORAL at 14:33

## 2025-05-11 RX ADMIN — PANTOPRAZOLE SODIUM 40 MG: 40 TABLET, DELAYED RELEASE ORAL at 09:04

## 2025-05-11 RX ADMIN — OXYBUTYNIN CHLORIDE 5 MG: 5 TABLET ORAL at 21:16

## 2025-05-11 RX ADMIN — CEFTRIAXONE SODIUM 1 G: 1 INJECTION, SOLUTION INTRAVENOUS at 11:34

## 2025-05-11 RX ADMIN — SODIUM CHLORIDE, SODIUM LACTATE, POTASSIUM CHLORIDE, AND CALCIUM CHLORIDE 1000 ML: .6; .31; .03; .02 INJECTION, SOLUTION INTRAVENOUS at 11:34

## 2025-05-11 RX ADMIN — ENOXAPARIN SODIUM 40 MG: 100 INJECTION SUBCUTANEOUS at 21:16

## 2025-05-11 RX ADMIN — OXYBUTYNIN CHLORIDE 5 MG: 5 TABLET ORAL at 14:33

## 2025-05-11 RX ADMIN — TOPIRAMATE 25 MG: 25 TABLET, FILM COATED ORAL at 18:39

## 2025-05-11 RX ADMIN — SODIUM CHLORIDE, SODIUM LACTATE, POTASSIUM CHLORIDE, AND CALCIUM CHLORIDE 500 ML: .6; .31; .03; .02 INJECTION, SOLUTION INTRAVENOUS at 00:49

## 2025-05-11 RX ADMIN — OXYBUTYNIN CHLORIDE 5 MG: 5 TABLET ORAL at 09:04

## 2025-05-11 RX ADMIN — ONDANSETRON 4 MG: 2 INJECTION INTRAMUSCULAR; INTRAVENOUS at 06:36

## 2025-05-11 RX ADMIN — ROSUVASTATIN CALCIUM 20 MG: 20 TABLET, FILM COATED ORAL at 21:16

## 2025-05-11 ASSESSMENT — PAIN SCALES - GENERAL
PAINLEVEL_OUTOF10: 0 - NO PAIN
PAINLEVEL_OUTOF10: 10 - WORST POSSIBLE PAIN
PAINLEVEL_OUTOF10: 3

## 2025-05-11 ASSESSMENT — PAIN - FUNCTIONAL ASSESSMENT
PAIN_FUNCTIONAL_ASSESSMENT: 0-10

## 2025-05-11 NOTE — SIGNIFICANT EVENT
Rapid Response Nurse Note: CARMEN score of 7    Pager time: 14:30  Arrival time: 14:31  Event end time: 14:39  Location: [facility] [place]  [x] Triage by phone or secure messaging    Rapid response initiated by:  [] Rapid response RN [] Family [] Nursing Supervisor [] Physician   [x] RADAR auto page [] Sepsis auto-page [] RN [] RT   [] NP/PA [] Other:     Primary reason for call:   [] BAT [] New CPAP/BiPAP [] Bleeding [] Change in mental status   [] Chest pain [] Code blue [] FiO2 >/= 50% [] HR </= 40 bpm   [] HR >/= 130 bpm [] Hyperglycemia [] Hypoglycemia [x] RADAR    [] RR </= 8 bpm [] RR >/= 30 bpm [] SBP </= 90 mmHg [] SpO2 < 90%   [] Seizure [] Sepsis [] Shortness of breath  [] Staff concern: see comments     Interventions:  [x] None [] ABG/VBG [] Assist w/ICU transfer [] BAT paged    [] Bag mask [] Blood [] Cardioversion [] Code Blue   [] Code blue for intubation [] Code status changed [] Chest x-ray [] EKG   [] IV fluid/bolus [] KUB x-ray [] Labs/cultures [] Medication   [] Nebulizer treatment [] NIPPV (CPAP/BiPAP) [] Oxygen [] Oral airway   [] Peripheral IV [] Palliative care consult [] CT/MRI [] Sepsis protocol    [] Suctioned [] Other:     Outcome:  [] Coded and  [] Code blue for intubation [] Coded and transferred to ICU []  on division   [x] Remained on division (no change) [] Remained on division + additional monitoring [] Remained in ED [] Transferred to ED   [] Transferred to ICU [] Transferred to inpatient status [] Transferred for interventions (procedure) [] Transferred to ICU stepdown    [] Transferred to surgery [] Transferred to telemetry [] Sepsis protocol [] STEMI protocol   [] Stroke protocol       Additional Comments:      Notified nurse of CARMEN mortensen received: 39.4 99 26 131/81 95.    Patient being treated for fever, nausea and chills per nurse.  No rapid response assistance needed at this time; encouraged to call a rapid response as needed if patient status changes.

## 2025-05-11 NOTE — CARE PLAN
The patient's goals for the shift include Comfort    The clinical goals for the shift include Provide comfort for patient    Over the shift, the patient did not make progress toward the following goals. Barriers to progression include   Problem: Pain - Adult  Goal: Verbalizes/displays adequate comfort level or baseline comfort level  Outcome: Progressing     Problem: Safety - Adult  Goal: Free from fall injury  Outcome: Progressing     Problem: Nutrition  Goal: Nutrient intake appropriate for maintaining nutritional needs  Outcome: Progressing

## 2025-05-11 NOTE — ASSESSMENT & PLAN NOTE
Radha Shabazz is a 69 y.o. female with PMHx of breast cancer S/P R mastectomy, adjuvant chemotherapy, currently on abemaciclib/anastrozole for 2 years (planned completion on 7/2025), history of chemotherapy-induced cardiotoxicity, advanced Rt knee osteoarthritis pending replacement, and bilateral renal stones, presenting with generalized fatigue and vomiting with chills for 3 days associated with headache. Tried home pain medications without significant improvement. CBC shows leukopenia of 2.3 (at baseline) with low plt 111.with dirty urine Chest X-ray negative. CT head negative for intracranial process.    UPDATE 5/11  -Will give 1L of fluids  -Will start IV Abx for UTI iso hx of b/l  non-obstructing renal stones, immunosuppression and symptoms of chills, fatigue and vomiting  -encourage to feed  -PT/OT eval for fatigue     # Headache with vomiting, non-specific  # Fatigue  # Mild hyponatremia  :: Patient had non-specific fatigue with chills without objective fever along with reduced appetite for the past 3 days, N/V  :: Appears dry on exam  :: Lab showing mild hyponatremia of 134, improved to 137 likely hypovolemic hyponatremia from vomiting CBC    pending blood culture  Plan;  -follow urine Osm/Na, RFP for hyponatremia    #Possible UTI  #hX OF B/L renal stones  #pt denies dysuria, frequency, however with chills and fatigue  :: UA pyuria , as UTI could also present w/ chills and vomiting will treat pt  Plan  - symptomatic treatment: pain control  with Tylenol/Topomax,   - zofran prn for N/V  - s/p 500 mL of LR, will give another 1L today, encourage oral intake  -IV Ceftriazone 1g daily  -fu urine c/s     # PMHx of breast cancer S/P R mastectomy, adjuvant chemotherapy,   currently on abemaciclib/anastrozole for 2 years (planned completion on 7/2025)  # History of chemotherapy-induced cardiotoxicity  :: Patient not having SOB or symptoms suspicious of HF  Plan   - hold arimidex while admitted inpatient      #Thrombocytopenia (baseline normal; admit 111)  Unclear. Other cell lines stable (ANC >500), will continue to monitor     #Chronic advanced Rt knee osteoarthritis pending replacement  - Hold ibuprofen while admitted  - Will adjust pain meds as needed     #Hyperlipidemia  -continue crestor 20 mg     #History of overactive bladder  - continue previous meds: oxybutynin 5 mg TID  - vesicare held inpatient     F: replete prn  E: replete prn, keep K>4, Mg>2  N: regular diet  A: PIVx1     DVT ppx: lovenox 40 mg subcutaneous daily  GI ppx: not indicated  Bowel regimen: miralax  O2 None     DNR DNI  NOK: Karri Shabazz (Spouse) [phone] (Work Phone)

## 2025-05-11 NOTE — PROGRESS NOTES
Radha Shabazz is a 69 y.o. female on day 1 of admission presenting with Nausea and vomiting.      Subjective   Pt denies any vomiting today, will try a diet today, reports headache is about 8 severity improved with tylenol.    Objective     Last Recorded Vitals  BP 93/61 (BP Location: Right arm, Patient Position: Lying)   Pulse 65   Temp 36.7 °C (98.1 °F) (Temporal)   Resp 16   Wt 88.5 kg (195 lb)   SpO2 95%   Intake/Output last 3 Shifts:    Intake/Output Summary (Last 24 hours) at 5/11/2025 1023  Last data filed at 5/11/2025 0400  Gross per 24 hour   Intake 550 ml   Output 0 ml   Net 550 ml     Admission Weight  Weight: 88.5 kg (195 lb) (05/10/25 2013)    Daily Weight  05/10/25 : 88.5 kg (195 lb)    Image Results  CT head wo IV contrast  Narrative: Interpreted By:  Angelo Marques and Hooper Grayson   STUDY:  [id number]  CT HEAD WO IV CONTRAST      INDICATION:  Signs/Symptoms:eval for acute onset n/v in the setting of cancer hx      COMPARISON:  None.      ACCESSION NUMBER(S):  [id number]      ORDERING CLINICIAN:  BIA MOLINA      TECHNIQUE:  Noncontrast helical CT of the head was performed. Multiplanar  reformations in bone and soft tissue algorithm were provided.      FINDINGS:  There is no CT evidence of acute loss of gray-white differentiation.  Mild physiologic mineralization of the globus pallidus is noted, a  common normal finding for a patient of this age. No evidence for a  mass or for vasogenic edema.      No evidence of acute intracranial hemorrhage.      No intracranial mass or mass effect.      No midline shift or herniation.      No ventriculomegaly. Normal appearance of the basilar cisterns.      Normal CT appearances of the paranasal sinuses. Status post bilateral  lens extractions. No mastoid effusion. No acute or aggressive  appearing calvarial lesion.      No evidence for acute dense thrombus on this study. Mild calcific  atherosclerosis demonstrated along cavernous segments of  bilateral  internal carotid arteries. Punctate calcification adjacent to the  basilar artery is noted but favored to be instead ossification of the  petroclival ligament, rather than basilar atherosclerosis.      Within the scalp there are a few tiny well-circumscribed subcutaneous  nodules with no aggressive imaging characteristics. They have fairly  uniform attenuation. They look fairly typical for benign sebaceous  cysts.      Impression: 1. There is no CT evidence of acute intracranial hemorrhage or other  acute findings.  2. Within the limitations of noncontrast head CT imaging, no findings  to suggest metastatic disease. I would let the clinical picture  determine the need for follow-up brain MR imaging with and without IV  gadolinium contrast.      I personally reviewed the images/study and I agree with the findings  as stated.      Signed by: Angelo Marques 5/10/2025 4:38 PM  Dictation workstation:   [id number]  XR chest 1 view  Narrative: STUDY:  Chest Radiograph;  05/10/2025 at 14:22 hours.  INDICATION:  Chest pain.  COMPARISON:  CT Chest 02/15/2023.  XR Chest 01/27/2023.  ACCESSION NUMBER(S):  [id number]  ORDERING CLINICIAN:  BIA MOLINA  TECHNIQUE:  Frontal chest was obtained at 14:22 hours.  FINDINGS:  Left port is in place with its tip in the right atrium.  CARDIOMEDIASTINAL SILHOUETTE:  Cardiomediastinal silhouette is normal in size and configuration.     LUNGS:  Lungs are clear.     ABDOMEN:  No remarkable upper abdominal findings.     BONES:  No acute osseous changes.  Impression: No acute pulmonary pathology.  Signed by Dionte Blair MD      Physical Exam  Constitutional:       Appearance: Normal appearance.   Cardiovascular:      Rate and Rhythm: Normal rate.      Pulses: Normal pulses.      Heart sounds: No murmur heard.  Pulmonary:      Effort: Pulmonary effort is normal. No respiratory distress.      Breath sounds: Normal breath sounds.   Abdominal:      General: Abdomen is flat.  There is no distension.      Tenderness: There is no abdominal tenderness.   Musculoskeletal:      Right lower leg: No edema.      Left lower leg: No edema.   Skin:     General: Skin is warm.      Capillary Refill: Capillary refill takes less than 2 seconds.      Coloration: Skin is not jaundiced or pale.   Neurological:      General: No focal deficit present.      Mental Status: She is alert and oriented to person, place, and time.     Relevant Results  Scheduled medications  Scheduled Medications[1]  Continuous medications  Continuous Medications[2]  PRN medications  PRN Medications[3]        This patient has a central line   Reason for the central line remaining today? Parenteral medication      Assessment & Plan  Nausea and vomiting    Emesis  Radha Shabazz is a 69 y.o. female with PMHx of breast cancer S/P R mastectomy, adjuvant chemotherapy, currently on abemaciclib/anastrozole for 2 years (planned completion on 7/2025), history of chemotherapy-induced cardiotoxicity, advanced Rt knee osteoarthritis pending replacement, and bilateral renal stones, presenting with generalized fatigue and vomiting with chills for 3 days associated with headache. Tried home pain medications without significant improvement. CBC shows leukopenia of 2.3 (at baseline) with low plt 111.with dirty urine Chest X-ray negative. CT head negative for intracranial process.    UPDATE 5/11  -Will give 1L of fluids  -Will start IV Abx for UTI iso hx of b/l  non-obstructing renal stones, immunosuppression and symptoms of chills, fatigue and vomiting  -encourage to feed  -PT/OT eval for fatigue     # Headache with vomiting, non-specific  # Fatigue  # Mild hyponatremia  :: Patient had non-specific fatigue with chills without objective fever along with reduced appetite for the past 3 days, N/V  :: Appears dry on exam  :: Lab showing mild hyponatremia of 134, improved to 137 likely hypovolemic hyponatremia from vomiting CBC    pending blood  culture  Plan;  -follow urine Osm/Na, RFP for hyponatremia    #Possible UTI  #hX OF B/L renal stones  #pt denies dysuria, frequency, however with chills and fatigue  :: UA pyuria , as UTI could also present w/ chills and vomiting will treat pt  Plan  - symptomatic treatment: pain control  with Tylenol/Topomax,   - zofran prn for N/V  - s/p 500 mL of LR, will give another 1L today, encourage oral intake  -IV Ceftriazone 1g daily  -fu urine c/s     # PMHx of breast cancer S/P R mastectomy, adjuvant chemotherapy,   currently on abemaciclib/anastrozole for 2 years (planned completion on 7/2025)  # History of chemotherapy-induced cardiotoxicity  :: Patient not having SOB or symptoms suspicious of HF  Plan   - hold arimidex while admitted inpatient     #Thrombocytopenia (baseline normal; admit 111)  Unclear. Other cell lines stable (ANC >500), will continue to monitor     #Chronic advanced Rt knee osteoarthritis pending replacement  - Hold ibuprofen while admitted  - Will adjust pain meds as needed     #Hyperlipidemia  -continue crestor 20 mg     #History of overactive bladder  - continue previous meds: oxybutynin 5 mg TID  - vesicare held inpatient     F: replete prn  E: replete prn, keep K>4, Mg>2  N: regular diet  A: PIVx1     DVT ppx: lovenox 40 mg subcutaneous daily  GI ppx: not indicated  Bowel regimen: miralax  O2 None     DNR DNI  NOK: Karri Shabazz (Spouse) [phone] (Work Phone)      Pt seen and discussed with attending Dr Vivek Mendes MD           [1] [Held by provider] anastrozole, 1 mg, oral, Daily  enoxaparin, 40 mg, subcutaneous, q24h  escitalopram, 5 mg, oral, Daily  oxyBUTYnin, 5 mg, oral, TID  pantoprazole, 40 mg, oral, Daily before breakfast  rosuvastatin, 20 mg, oral, Nightly  [2]    [3] PRN medications: acetaminophen, ondansetron, polyethylene glycol, topiramate

## 2025-05-11 NOTE — PROGRESS NOTES
Pharmacy Medication History Review    Radha Shabazz is a 69 y.o. female admitted for Nausea and vomiting. Pharmacy reviewed the patient's [id number] medications and allergies for accuracy.    The list below reflects the updated PTA list.   Prior to Admission Medications   Prescriptions Last Dose Informant   UNABLE TO FIND  Self   Sig: Breast prosthesis for left and/or right breast   UNABLE TO FIND  Self   Sig: Mastectomy Bra for Right Mastectomy   abemaciclib (VERZENIO ORAL) 5/9/2025 Bedtime Self   Sig: Take 1 tablet by mouth 2 times a day.   anastrozole (Arimidex) 1 mg tablet 5/9/2025 Bedtime Self   Sig: Take 1 tablet (1 mg total) by mouth once daily.  Swallow whole with a drink of water.   anastrozole (Arimidex) 1 mg tablet  Self   Sig: Take 1 tablet (1 mg total) by mouth once daily.  Swallow whole with a drink of water.   escitalopram (Lexapro) 20 mg tablet 5/9/2025 Bedtime Self   Sig: Take 0.5 tablets (10 mg) by mouth once daily.   nitrofurantoin, macrocrystal-monohydrate, (Macrobid) 100 mg capsule Unknown Self   Sig: Take 1 capsule (100 mg) by mouth every 12 hours.  Not currently using, but has on hand for emergency use    omeprazole (PriLOSEC) 20 mg DR capsule 5/9/2025 Bedtime Self   Sig: Take 1 capsule (20 mg) by mouth once daily.   rosuvastatin (Crestor) 20 mg tablet 5/9/2025 Bedtime Self   Sig: Take 1 tablet (20 mg) by mouth once daily.   sodium hyaluronate (Durolane) 60 mg/3 mL injection More than a month Self   Sig: Inject one syringe(60mg/3mL) into right knee one time at doctors office   solifenacin (Vesicare) 10 mg tablet 5/9/2025 Bedtime Self   Sig: Take 1 tablet (10 mg) by mouth once daily. Swallow tablet whole; do not crush, chew, or split.   topiramate (Topamax) 25 mg tablet Past Month Self   Sig: Take 1 tablet (25 mg) by mouth once daily as needed.      Facility-Administered Medications: None        The list below reflects the updated allergy list. Please review each documented allergy for  "additional clarification and justification.  Allergies  Reviewed by Henrietta Posey, RN on 5/10/2025        Severity Reactions Comments    Iodine Not Specified Hives     Iodinated Contrast Media Low Rash, Swelling, Unknown     Sulfa (sulfonamide Antibiotics) Low Rash, Swelling, Unknown, Hives             Patient declines M2B at discharge.     Sources:   Patient Interview - good historian  Admission MedRec Grid  OARRS - none recent  EPIC medication dispense report    Medications ADDED:  Verzenio  Medications CHANGED:  Escitalopram - changed quantity administered from 5mg to 10mg  Medications REMOVED/MARKED NOT TAKING:   None     Additional Comments:  Patient takes daily medications at bedtime    Nataly Patel, PharmD  Transitions of Care Pharmacist  05/10/25     Secure Chat preferred   If no response call [id number] or [email or web address] \"Med Rec\"    "

## 2025-05-12 LAB
ALBUMIN SERPL BCP-MCNC: 3.5 G/DL (ref 3.4–5)
ANION GAP SERPL CALC-SCNC: 12 MMOL/L (ref 10–20)
APTT PPP: 27 SECONDS (ref 26–36)
BASOPHILS # BLD MANUAL: 0.02 X10*3/UL (ref 0–0.1)
BASOPHILS NFR BLD MANUAL: 0.9 %
BUN SERPL-MCNC: 12 MG/DL (ref 6–23)
BURR CELLS BLD QL SMEAR: ABNORMAL
CALCIUM SERPL-MCNC: 9.1 MG/DL (ref 8.6–10.6)
CHLORIDE SERPL-SCNC: 106 MMOL/L (ref 98–107)
CO2 SERPL-SCNC: 24 MMOL/L (ref 21–32)
CREAT SERPL-MCNC: 0.96 MG/DL (ref 0.5–1.05)
D DIMER PPP FEU-MCNC: 820 NG/ML FEU
EGFRCR SERPLBLD CKD-EPI 2021: 65 ML/MIN/1.73M*2
EOSINOPHIL # BLD MANUAL: 0.02 X10*3/UL (ref 0–0.7)
EOSINOPHIL NFR BLD MANUAL: 0.8 %
ERYTHROCYTE [DISTWIDTH] IN BLOOD BY AUTOMATED COUNT: 12.1 % (ref 11.5–14.5)
FIBRINOGEN PPP-MCNC: 499 MG/DL (ref 200–400)
GLUCOSE SERPL-MCNC: 112 MG/DL (ref 74–99)
HCT VFR BLD AUTO: 29.9 % (ref 36–46)
HGB BLD-MCNC: 10.8 G/DL (ref 12–16)
HOLD SPECIMEN: NORMAL
IMM GRANULOCYTES # BLD AUTO: 0.01 X10*3/UL (ref 0–0.7)
IMM GRANULOCYTES NFR BLD AUTO: 0.4 % (ref 0–0.9)
INR PPP: 1.2 (ref 0.9–1.1)
LYMPHOCYTES # BLD MANUAL: 0.3 X10*3/UL (ref 1.2–4.8)
LYMPHOCYTES NFR BLD MANUAL: 11.1 %
MAGNESIUM SERPL-MCNC: 2.22 MG/DL (ref 1.6–2.4)
MCH RBC QN AUTO: 34.2 PG (ref 26–34)
MCHC RBC AUTO-ENTMCNC: 36.1 G/DL (ref 32–36)
MCV RBC AUTO: 95 FL (ref 80–100)
MONOCYTES # BLD MANUAL: 0.21 X10*3/UL (ref 0.1–1)
MONOCYTES NFR BLD MANUAL: 7.7 %
NEUTROPHILS # BLD MANUAL: 2.03 X10*3/UL (ref 1.2–7.7)
NEUTS BAND # BLD MANUAL: 0.44 X10*3/UL (ref 0–0.7)
NEUTS BAND NFR BLD MANUAL: 16.2 %
NEUTS SEG # BLD MANUAL: 1.59 X10*3/UL (ref 1.2–7)
NEUTS SEG NFR BLD MANUAL: 59 %
NRBC BLD-RTO: 0 /100 WBCS (ref 0–0)
OVALOCYTES BLD QL SMEAR: ABNORMAL
PHOSPHATE SERPL-MCNC: 1.9 MG/DL (ref 2.5–4.9)
PLATELET # BLD AUTO: 91 X10*3/UL (ref 150–450)
POTASSIUM SERPL-SCNC: 3.7 MMOL/L (ref 3.5–5.3)
PROTHROMBIN TIME: 12.8 SECONDS (ref 9.8–12.4)
RBC # BLD AUTO: 3.16 X10*6/UL (ref 4–5.2)
RBC MORPH BLD: ABNORMAL
SODIUM SERPL-SCNC: 138 MMOL/L (ref 136–145)
TOTAL CELLS COUNTED BLD: 117
VARIANT LYMPHS # BLD MANUAL: 0.12 X10*3/UL (ref 0–0.5)
VARIANT LYMPHS NFR BLD: 4.3 %
WBC # BLD AUTO: 2.7 X10*3/UL (ref 4.4–11.3)

## 2025-05-12 PROCEDURE — 2500000004 HC RX 250 GENERAL PHARMACY W/ HCPCS (ALT 636 FOR OP/ED): Mod: JZ | Performed by: NURSE PRACTITIONER

## 2025-05-12 PROCEDURE — 85379 FIBRIN DEGRADATION QUANT: CPT | Performed by: NURSE PRACTITIONER

## 2025-05-12 PROCEDURE — 85610 PROTHROMBIN TIME: CPT | Performed by: NURSE PRACTITIONER

## 2025-05-12 PROCEDURE — 80069 RENAL FUNCTION PANEL: CPT

## 2025-05-12 PROCEDURE — 99233 SBSQ HOSP IP/OBS HIGH 50: CPT | Performed by: NURSE PRACTITIONER

## 2025-05-12 PROCEDURE — 97161 PT EVAL LOW COMPLEX 20 MIN: CPT | Mod: GP

## 2025-05-12 PROCEDURE — 2500000004 HC RX 250 GENERAL PHARMACY W/ HCPCS (ALT 636 FOR OP/ED): Mod: JZ

## 2025-05-12 PROCEDURE — 85384 FIBRINOGEN ACTIVITY: CPT | Performed by: NURSE PRACTITIONER

## 2025-05-12 PROCEDURE — 2500000005 HC RX 250 GENERAL PHARMACY W/O HCPCS: Performed by: NURSE PRACTITIONER

## 2025-05-12 PROCEDURE — 2500000001 HC RX 250 WO HCPCS SELF ADMINISTERED DRUGS (ALT 637 FOR MEDICARE OP)

## 2025-05-12 PROCEDURE — 85007 BL SMEAR W/DIFF WBC COUNT: CPT

## 2025-05-12 PROCEDURE — 1170000001 HC PRIVATE ONCOLOGY ROOM DAILY

## 2025-05-12 PROCEDURE — 2500000001 HC RX 250 WO HCPCS SELF ADMINISTERED DRUGS (ALT 637 FOR MEDICARE OP): Performed by: NURSE PRACTITIONER

## 2025-05-12 PROCEDURE — 85027 COMPLETE CBC AUTOMATED: CPT

## 2025-05-12 PROCEDURE — 83735 ASSAY OF MAGNESIUM: CPT

## 2025-05-12 PROCEDURE — 2500000002 HC RX 250 W HCPCS SELF ADMINISTERED DRUGS (ALT 637 FOR MEDICARE OP, ALT 636 FOR OP/ED)

## 2025-05-12 RX ORDER — BUTALBITAL, ACETAMINOPHEN AND CAFFEINE 50; 325; 40 MG/1; MG/1; MG/1
1 TABLET ORAL EVERY 4 HOURS PRN
Status: DISCONTINUED | OUTPATIENT
Start: 2025-05-12 | End: 2025-05-13 | Stop reason: HOSPADM

## 2025-05-12 RX ADMIN — BUTALBITAL, ACETAMINOPHEN, AND CAFFEINE 1 TABLET: 325; 50; 40 TABLET ORAL at 12:48

## 2025-05-12 RX ADMIN — ACETAMINOPHEN 975 MG: 325 TABLET, FILM COATED ORAL at 02:35

## 2025-05-12 RX ADMIN — ENOXAPARIN SODIUM 40 MG: 100 INJECTION SUBCUTANEOUS at 20:44

## 2025-05-12 RX ADMIN — BUTALBITAL, ACETAMINOPHEN, AND CAFFEINE 1 TABLET: 325; 50; 40 TABLET ORAL at 21:05

## 2025-05-12 RX ADMIN — POTASSIUM PHOSPHATE, MONOBASIC AND POTASSIUM PHOSPHATE, DIBASIC 21 MMOL: 224; 236 INJECTION, SOLUTION, CONCENTRATE INTRAVENOUS at 12:38

## 2025-05-12 RX ADMIN — ESCITALOPRAM OXALATE 5 MG: 10 TABLET ORAL at 20:44

## 2025-05-12 RX ADMIN — OXYBUTYNIN CHLORIDE 5 MG: 5 TABLET ORAL at 10:13

## 2025-05-12 RX ADMIN — ACETAMINOPHEN 975 MG: 325 TABLET, FILM COATED ORAL at 12:48

## 2025-05-12 RX ADMIN — ROSUVASTATIN CALCIUM 20 MG: 20 TABLET, FILM COATED ORAL at 20:45

## 2025-05-12 RX ADMIN — CEFTRIAXONE SODIUM 1 G: 1 INJECTION, SOLUTION INTRAVENOUS at 10:12

## 2025-05-12 RX ADMIN — PANTOPRAZOLE SODIUM 40 MG: 40 TABLET, DELAYED RELEASE ORAL at 10:13

## 2025-05-12 RX ADMIN — OXYBUTYNIN CHLORIDE 5 MG: 5 TABLET ORAL at 18:28

## 2025-05-12 ASSESSMENT — COGNITIVE AND FUNCTIONAL STATUS - GENERAL
MOBILITY SCORE: 24
DAILY ACTIVITIY SCORE: 24
MOBILITY SCORE: 24

## 2025-05-12 ASSESSMENT — PAIN SCALES - GENERAL
PAINLEVEL_OUTOF10: 8
PAINLEVEL_OUTOF10: 7
PAINLEVEL_OUTOF10: 6
PAINLEVEL_OUTOF10: 3
PAINLEVEL_OUTOF10: 5 - MODERATE PAIN

## 2025-05-12 ASSESSMENT — PAIN DESCRIPTION - DESCRIPTORS: DESCRIPTORS: STABBING

## 2025-05-12 ASSESSMENT — PAIN - FUNCTIONAL ASSESSMENT
PAIN_FUNCTIONAL_ASSESSMENT: 0-10

## 2025-05-12 ASSESSMENT — ACTIVITIES OF DAILY LIVING (ADL): ADL_ASSISTANCE: INDEPENDENT

## 2025-05-12 ASSESSMENT — PAIN DESCRIPTION - LOCATION: LOCATION: HEAD

## 2025-05-12 NOTE — PROGRESS NOTES
Physical Therapy    Physical Therapy Evaluation    Patient Name: Radha Shabazz  MRN: [id number]  Today's Date: 5/12/2025   Time Calculation  Start Time: 0843  Stop Time: 0851  Time Calculation (min): 8 min    Assessment/Plan   PT Assessment  Barriers to Discharge Home: No anticipated barriers  End of Session Communication: Bedside nurse  Assessment Comment: Pt mobilizing at baseline. No PT needs at this time.  End of Session Patient Position: Bed, 3 rail up, Alarm off, not on at start of session  IP OR SWING BED PT PLAN  Inpatient or Swing Bed: Inpatient  PT Plan  PT Plan: PT Eval only  PT Eval Only Reason: At baseline function  PT Frequency: PT eval only  PT Discharge Recommendations: No further acute PT, No PT needed after discharge  PT Recommended Transfer Status: Independent  PT - OK to Discharge: Yes      Subjective   General Visit Information:  Reason for Referral: presenting with generalized fatigue and vomiting with chills for 3 days associated with headache  Past Medical History Relevant to Rehab: breast cancer S/P R mastectomy, adjuvant chemotherapy, currently on abemaciclib/anastrozole for 2 years (planned completion on 7/2025), history of chemotherapy-induced cardiotoxicity, advanced Rt knee osteoarthritis pending replacement, and bilateral renal stones,  Prior to Session Communication: Bedside nurse  Patient Position Received: Bed, 3 rail up, Alarm off, not on at start of session   Home Living:  Home Living  Type of Home: House  Lives With: Spouse, Adult children  Home Adaptive Equipment: Walker rolling or standard  Home Layout: Two level  Home Access: Stairs to enter with rails  Entrance Stairs-Number of Steps: 3  Prior Level of Function:  Prior Function Per Pt/Caregiver Report  ADL Assistance: Independent  Homemaking Assistance: Independent  Ambulatory Assistance: Independent  Vocational: Full time employment (RN)       Objective     Pain:  Pain Assessment  Pain Assessment: 0-10  0-10 (Numeric) Pain  Score: 8  Pain Location: Head  Cognition:  Cognition  Overall Cognitive Status: Within Functional Limits      Extremity/Trunk Assessments:  Strength:     RUE   RUE : Within Functional Limits  LUE   LUE: Within Functional Limits  RLE   RLE : Within Functional Limits  LLE   LLE : Within Functional Limits    General Assessments:            Sensation  Light Touch: No apparent deficits  Coordination  Movements are Fluid and Coordinated: Yes  Static Sitting Balance  Static Sitting-Level of Assistance: Independent  Dynamic Sitting Balance  Dynamic Sitting-Level of Assistance: Independent  Static Standing Balance  Static Standing-Level of Assistance: Independent  Dynamic Standing Balance  Dynamic Standing-Level of Assistance: Independent    Functional Assessments:  Bed Mobility  Bed Mobility: Yes  Bed Mobility 1  Bed Mobility 1: Supine to sitting, Sitting to supine  Level of Assistance 1: Independent  Transfers  Transfer: Yes  Transfer 1  Technique 1: Sit to stand, Stand to sit  Transfer Level of Assistance 1: Independent  Ambulation/Gait Training  Ambulation/Gait Training Performed: Yes  Ambulation/Gait Training 1  Device 1: No device  Assistance 1: Independent  Quality of Gait 1: Decreased step length  Comments/Distance (ft) 1: 40 feet          Outcome Measures:  [facility] Basic Mobility  Turning from your back to your side while in a flat bed without using bedrails: None  Moving from lying on your back to sitting on the side of a flat bed without using bedrails: None  Moving to and from bed to chair (including a wheelchair): None  Standing up from a chair using your arms (e.g. wheelchair or bedside chair): None  To walk in hospital room: None  Climbing 3-5 steps with railing: None  Basic Mobility - Total Score: 24                                 Education Documentation  Mobility Training, taught by Edna Lee PT at 5/12/2025  9:40 AM.  Learner: Patient  Readiness: Acceptance  Method: Explanation  Response: Verbalizes  Understanding  Comment: role of PT    Education Comments  No comments found.              Edna Lee, PT

## 2025-05-12 NOTE — CARE PLAN
The patient's goals for the shift include Comfort      Problem: Pain - Adult  Goal: Verbalizes/displays adequate comfort level or baseline comfort level  Outcome: Progressing     Problem: Safety - Adult  Goal: Free from fall injury  Outcome: Progressing     Problem: Discharge Planning  Goal: Discharge to home or other facility with appropriate resources  Outcome: Progressing     Problem: Chronic Conditions and Co-morbidities  Goal: Patient's chronic conditions and co-morbidity symptoms are monitored and maintained or improved  Outcome: Progressing     Problem: Nutrition  Goal: Nutrient intake appropriate for maintaining nutritional needs  Outcome: Progressing

## 2025-05-12 NOTE — PROGRESS NOTES
"Radha Shabazz is a 69 y.o. female on day 2 of admission presenting with Nausea and vomiting.    Subjective   Seen this AM at the bedside. Pt reports that she is overall feeling okay. States she had a fever yesterday evening that broke with tylenol. She endorses significant HA that starts at the base of her skull and wraps around to her right temple. Discussed adding fioricet today. She denies any  s/sx. No SOB or CP.    Objective     Physical Exam  Vitals reviewed.   Constitutional:       Appearance: She is obese.   HENT:      Head: Normocephalic and atraumatic.      Nose: Nose normal.      Mouth/Throat:      Mouth: Mucous membranes are moist.      Pharynx: Oropharynx is clear.   Eyes:      Extraocular Movements: Extraocular movements intact.      Pupils: Pupils are equal, round, and reactive to light.   Cardiovascular:      Rate and Rhythm: Normal rate and regular rhythm.      Pulses: Normal pulses.      Heart sounds: Normal heart sounds.   Pulmonary:      Effort: Pulmonary effort is normal.      Breath sounds: Normal breath sounds.   Abdominal:      General: Bowel sounds are normal.      Palpations: Abdomen is soft.   Musculoskeletal:         General: Normal range of motion.   Skin:     General: Skin is warm.   Neurological:      General: No focal deficit present.      Mental Status: She is alert and oriented to person, place, and time. Mental status is at baseline.   Psychiatric:         Mood and Affect: Mood normal.         Behavior: Behavior normal.       Last Recorded Vitals  Blood pressure 105/65, pulse 72, temperature 37.8 °C (100.1 °F), temperature source Oral, resp. rate 18, height 1.676 m (5' 6\"), weight 88.5 kg (195 lb), SpO2 94%.  Intake/Output last 3 Shifts:  I/O last 3 completed shifts:  In: 1030 (11.6 mL/kg) [P.O.:530; IV Piggyback:500]  Out: 0 (0 mL/kg)   Weight: 88.5 kg     Results for orders placed or performed during the hospital encounter of 05/10/25 (from the past 24 hours)   Magnesium "   Result Value Ref Range    Magnesium 2.22 1.60 - 2.40 mg/dL   Renal Function Panel   Result Value Ref Range    Glucose 112 (H) 74 - 99 mg/dL    Sodium 138 136 - 145 mmol/L    Potassium 3.7 3.5 - 5.3 mmol/L    Chloride 106 98 - 107 mmol/L    Bicarbonate 24 21 - 32 mmol/L    Anion Gap 12 10 - 20 mmol/L    Urea Nitrogen 12 6 - 23 mg/dL    Creatinine 0.96 0.50 - 1.05 mg/dL    eGFR 65 >60 mL/min/1.73m*2    Calcium 9.1 8.6 - 10.6 mg/dL    Phosphorus 1.9 (L) 2.5 - 4.9 mg/dL    Albumin 3.5 3.4 - 5.0 g/dL   CBC and Auto Differential   Result Value Ref Range    WBC 2.7 (L) 4.4 - 11.3 x10*3/uL    nRBC 0.0 0.0 - 0.0 /100 WBCs    RBC 3.16 (L) 4.00 - 5.20 x10*6/uL    Hemoglobin 10.8 (L) 12.0 - 16.0 g/dL    Hematocrit 29.9 (L) 36.0 - 46.0 %    MCV 95 80 - 100 fL    MCH 34.2 (H) 26.0 - 34.0 pg    MCHC 36.1 (H) 32.0 - 36.0 g/dL    RDW 12.1 11.5 - 14.5 %    Platelets 91 (L) 150 - 450 x10*3/uL    Immature Granulocytes %, Automated 0.4 0.0 - 0.9 %    Immature Granulocytes Absolute, Automated 0.01 0.00 - 0.70 x10*3/uL   Manual Differential   Result Value Ref Range    Neutrophils %, Manual 59.0 40.0 - 80.0 %    Bands %, Manual 16.2 0.0 - 5.0 %    Lymphocytes %, Manual 11.1 13.0 - 44.0 %    Monocytes %, Manual 7.7 2.0 - 10.0 %    Eosinophils %, Manual 0.8 0.0 - 6.0 %    Basophils %, Manual 0.9 0.0 - 2.0 %    Atypical Lymphocytes %, Manual 4.3 0.0 - 2.0 %    Seg Neutrophils Absolute, Manual 1.59 1.20 - 7.00 x10*3/uL    Bands Absolute, Manual 0.44 0.00 - 0.70 x10*3/uL    Lymphocytes Absolute, Manual 0.30 (L) 1.20 - 4.80 x10*3/uL    Monocytes Absolute, Manual 0.21 0.10 - 1.00 x10*3/uL    Eosinophils Absolute, Manual 0.02 0.00 - 0.70 x10*3/uL    Basophils Absolute, Manual 0.02 0.00 - 0.10 x10*3/uL    Atypical Lymphs Absolute, Manual 0.12 0.00 - 0.50 x10*3/uL    Total Cells Counted 117     Neutrophils Absolute, Manual 2.03 1.20 - 7.70 x10*3/uL    RBC Morphology See Below     Ovalocytes Many     [place] Cells Few      *Note: Due to a large number  of results and/or encounters for the requested time period, some results have not been displayed. A complete set of results can be found in Results Review.       Scheduled medications  Scheduled Medications[1]  Continuous medications  Continuous Medications[2]  PRN medications  PRN Medications[3]    Assessment & Plan  Nausea and vomiting  /  Emesis  Radha Shabazz is a 69 y.o. female with PMHx of breast cancer S/P R mastectomy, adjuvant chemotherapy, currently on abemaciclib/anastrozole for 2 years (planned completion on 7/2025), history of chemotherapy-induced cardiotoxicity, advanced Rt knee osteoarthritis pending replacement, and bilateral renal stones, presenting with generalized fatigue and vomiting with chills x3 days associated with headache. Pt with leukopenia (WBC 2.3k) on admit. sCr 1.19 (5/10) s/p 500mL LR followed by 1.5L LR 5/11 with improvement. UA (5/10) +leuks, 2+ nitrites. Ucx pending. CXR (5/10) negative for infectious rocess. CT head (5/10) negative for acute intracranial process. Pt febrile 5/11, started CTX (5/11- current) pending Ucx d/t fever I/s/o leukopenia/immunosuppression and hx b/l non-obstructing renal stones. DC pending infectious work up and resolution of fevers, likely tomorrow 5/13.    Updates 5/12:  - sCr improving, WNL today  - Continue CTX pending Ucx  - Added Fioricet PRN HA     # Headache with vomiting, non-specific  # Fatigue  - Pt endorses generalized fatigue and vomiting with chills x3 days associated with headache  - Likely 2/2 infectious process (likely UTI as below)  - No emesis since admission  - s/p 500mL LR 5/10 and 1.5L LR 5/11  - Zofran 4mg IV q8hrs PRN n/v 1st line  - Topamax 25mg BID PRN HA/migraine  - Added Fioricet q4hrs PRN HA 5/11    # UTI  # Hx b/l non-obstructive renal stones  # Hx OAB  - No dysuria, urgency/frequency, or hematuria but endorses chills/fever and vomiting  - UA (5/10) +leuks, 2+ nitrites. Ucx pending  - Pt febrile 5/11, started CTX (5/11-  current) pending Ucx d/t fever I/s/o leukopenia/immunosuppression and hx b/l non-obstructing renal stones  - Tylenol 975mg 3x/day PRN mild pain  - Home Vesicare sub as Oxybutynin 5mg TID while inpt as not formulary     # Breast cancer   - Follows with Dr. Olson  - 12/21/21 Diagnosed with clinical stage IIA ER/MI+, HER2- R ILC.  Started neoadj AR  - s/p R mastectomy 9/16/22.  Final path [id number]   - s/p 4 cycles adjuvant AC 12/2-1/13/22. Further chemo with Taxol held d/t side effects  - Currently on Abemaciclib/anastrozole (7/2023-present). Planned completion of abemaciclib 7/2025     # Thrombocytopenia  - Likely 2/2 acute infectious process  - Plts WNL at [facility]; Plts 111 on admit (5/10)--> 91 (5/12)  - Ddimer, Coags, and Fibrinogen pending 5/12    # Atrial Septal Aneurysm  # Hyperlipidemia  - Follows with cardiology, LOV 4/17/25  - Bubble study 1/18/24 negative for PFO, normal atrial sizes and filling pressures  - Continue home Rosuvastatin 20mg nightly    # Anxiety/Depression  - Continue home Lexapro 5mg daily    # Hypophosphatemia  - Phos 1.9 (5/12)--> s/p 21mmol Kphos 5/12  - Will continue to monitor     DVT prophy: Lovenox subcutaneous,SCDs, encourage ambulation    DISPO:  - Code status: DNR DNI  - NOK: Karri Shabazz (spouse): [phone]  - DC pending infectious work up and resolution of fevers, likely tomorrow 5/13  - RF ablatoin 5/20, Pelvic Med 5/21, Breast imaging + Onc FUV 6/16, Ortho + preadmission testing 7/9, TKR 7/30, Infusion 9/22, Rad Onc 11/21, Urology 3/30/26, TTE 4/13/26, Cardiology 4/16/26    I spent >60 minutes in the professional and overall care of this patient    Assessment and plan as above discussed with attending physician Dr. Yandy Cruz, APRN-CNP       [1] [Held by provider] anastrozole, 1 mg, oral, Daily  cefTRIAXone, 1 g, intravenous, q24h  enoxaparin, 40 mg, subcutaneous, q24h  escitalopram, 5 mg, oral, Daily  oxyBUTYnin, 5 mg, oral, TID  pantoprazole, 40 mg,  oral, Daily before breakfast  rosuvastatin, 20 mg, oral, Nightly  [2]    [3] PRN medications: acetaminophen, butalbital-acetaminophen-caff, ondansetron, polyethylene glycol, topiramate

## 2025-05-12 NOTE — ASSESSMENT & PLAN NOTE
Radha Shabazz is a 69 y.o. female with PMHx of breast cancer S/P R mastectomy, adjuvant chemotherapy, currently on abemaciclib/anastrozole for 2 years (planned completion on 7/2025), history of chemotherapy-induced cardiotoxicity, advanced Rt knee osteoarthritis pending replacement, and bilateral renal stones, presenting with generalized fatigue and vomiting with chills x3 days associated with headache. Pt with leukopenia (WBC 2.3k) on admit. sCr 1.19 (5/10) s/p 500mL LR followed by 1.5L LR 5/11 with improvement. UA (5/10) +leuks, 2+ nitrites. Ucx pending. CXR (5/10) negative for infectious rocess. CT head (5/10) negative for acute intracranial process. Pt febrile 5/11, started CTX (5/11- current) pending Ucx d/t fever I/s/o leukopenia/immunosuppression and hx b/l non-obstructing renal stones. DC pending infectious work up and resolution of fevers, likely tomorrow 5/13.    Updates 5/12:  - sCr improving, WNL today  - Continue CTX pending Ucx  - Added Fioricet PRN HA     # Headache with vomiting, non-specific  # Fatigue  - Pt endorses generalized fatigue and vomiting with chills x3 days associated with headache  - Likely 2/2 infectious process (likely UTI as below)  - No emesis since admission  - s/p 500mL LR 5/10 and 1.5L LR 5/11  - Zofran 4mg IV q8hrs PRN n/v 1st line  - Topamax 25mg BID PRN HA/migraine  - Added Fioricet q4hrs PRN HA 5/11    # UTI  # Hx b/l non-obstructive renal stones  # Hx OAB  - No dysuria, urgency/frequency, or hematuria but endorses chills/fever and vomiting  - UA (5/10) +leuks, 2+ nitrites. Ucx pending  - Pt febrile 5/11, started CTX (5/11- current) pending Ucx d/t fever I/s/o leukopenia/immunosuppression and hx b/l non-obstructing renal stones  - Tylenol 975mg 3x/day PRN mild pain  - Home Vesicare sub as Oxybutynin 5mg TID while inpt as not formulary     # Breast cancer   - Follows with Dr. Olson  - 12/21/21 Diagnosed with clinical stage IIA ER/MS+, HER2- R ILC.  Started neoadj AR  - s/p  R mastectomy 9/16/22.  Final path [id number]   - s/p 4 cycles adjuvant AC 12/2-1/13/22. Further chemo with Taxol held d/t side effects  - Currently on Abemaciclib/anastrozole (7/2023-present). Planned completion of abemaciclib 7/2025     # Thrombocytopenia  - Likely 2/2 acute infectious process  - Plts WNL at [facility]; Plts 111 on admit (5/10)--> 91 (5/12)  - Ddimer, Coags, and Fibrinogen pending 5/12    # Atrial Septal Aneurysm  # Hyperlipidemia  - Follows with cardiology, LOV 4/17/25  - Bubble study 1/18/24 negative for PFO, normal atrial sizes and filling pressures  - Continue home Rosuvastatin 20mg nightly    # Anxiety/Depression  - Continue home Lexapro 5mg daily    # Hypophosphatemia  - Phos 1.9 (5/12)--> s/p 21mmol Kphos 5/12  - Will continue to monitor     DVT prophy: Lovenox subcutaneous,SCDs, encourage ambulation    DISPO:  - Code status: DNR DNI  - NOK: Karri Shabazz (spouse): [phone]  - DC pending infectious work up and resolution of fevers, likely tomorrow 5/13  - RF ablatoin 5/20, Pelvic Med 5/21, Breast imaging + Onc FUV 6/16, Ortho + preadmission testing 7/9, TKR 7/30, Infusion 9/22, Rad Onc 11/21, Urology 3/30/26, TTE 4/13/26, Cardiology 4/16/26

## 2025-05-13 ENCOUNTER — PHARMACY VISIT (OUTPATIENT)
Dept: PHARMACY | Facility: CLINIC | Age: 70
End: 2025-05-13
Payer: MEDICARE

## 2025-05-13 VITALS
DIASTOLIC BLOOD PRESSURE: 71 MMHG | HEIGHT: 66 IN | OXYGEN SATURATION: 97 % | BODY MASS INDEX: 32.45 KG/M2 | HEART RATE: 62 BPM | TEMPERATURE: 97.6 F | RESPIRATION RATE: 17 BRPM | WEIGHT: 201.94 LBS | SYSTOLIC BLOOD PRESSURE: 111 MMHG

## 2025-05-13 LAB
ALBUMIN SERPL BCP-MCNC: 3.4 G/DL (ref 3.4–5)
ANION GAP SERPL CALC-SCNC: 11 MMOL/L (ref 10–20)
APPEARANCE UR: CLEAR
BASOPHILS # BLD MANUAL: 0 X10*3/UL (ref 0–0.1)
BASOPHILS NFR BLD MANUAL: 0 %
BILIRUB UR STRIP.AUTO-MCNC: NEGATIVE MG/DL
BUN SERPL-MCNC: 11 MG/DL (ref 6–23)
CALCIUM SERPL-MCNC: 9.2 MG/DL (ref 8.6–10.6)
CHLORIDE SERPL-SCNC: 105 MMOL/L (ref 98–107)
CO2 SERPL-SCNC: 25 MMOL/L (ref 21–32)
COLOR UR: NORMAL
CREAT SERPL-MCNC: 0.85 MG/DL (ref 0.5–1.05)
EGFRCR SERPLBLD CKD-EPI 2021: 75 ML/MIN/1.73M*2
EOSINOPHIL # BLD MANUAL: 0.07 X10*3/UL (ref 0–0.7)
EOSINOPHIL NFR BLD MANUAL: 2.6 %
ERYTHROCYTE [DISTWIDTH] IN BLOOD BY AUTOMATED COUNT: 12 % (ref 11.5–14.5)
GLUCOSE SERPL-MCNC: 107 MG/DL (ref 74–99)
GLUCOSE UR STRIP.AUTO-MCNC: NORMAL MG/DL
HCT VFR BLD AUTO: 29.3 % (ref 36–46)
HGB BLD-MCNC: 10.5 G/DL (ref 12–16)
IMM GRANULOCYTES # BLD AUTO: 0.01 X10*3/UL (ref 0–0.7)
IMM GRANULOCYTES NFR BLD AUTO: 0.4 % (ref 0–0.9)
KETONES UR STRIP.AUTO-MCNC: NEGATIVE MG/DL
LEUKOCYTE ESTERASE UR QL STRIP.AUTO: NEGATIVE
LYMPHOCYTES # BLD MANUAL: 0.57 X10*3/UL (ref 1.2–4.8)
LYMPHOCYTES NFR BLD MANUAL: 21.8 %
MAGNESIUM SERPL-MCNC: 2.13 MG/DL (ref 1.6–2.4)
MCH RBC QN AUTO: 33.4 PG (ref 26–34)
MCHC RBC AUTO-ENTMCNC: 35.8 G/DL (ref 32–36)
MCV RBC AUTO: 93 FL (ref 80–100)
MONOCYTES # BLD MANUAL: 0.23 X10*3/UL (ref 0.1–1)
MONOCYTES NFR BLD MANUAL: 8.7 %
MUCOUS THREADS #/AREA URNS AUTO: NORMAL /LPF
NEUTROPHILS # BLD MANUAL: 1.74 X10*3/UL (ref 1.2–7.7)
NEUTS BAND # BLD MANUAL: 0.04 X10*3/UL (ref 0–0.7)
NEUTS BAND NFR BLD MANUAL: 1.7 %
NEUTS SEG # BLD MANUAL: 1.7 X10*3/UL (ref 1.2–7)
NEUTS SEG NFR BLD MANUAL: 65.2 %
NITRITE UR QL STRIP.AUTO: NEGATIVE
NRBC BLD-RTO: 0 /100 WBCS (ref 0–0)
OVALOCYTES BLD QL SMEAR: ABNORMAL
PH UR STRIP.AUTO: 7 [PH]
PHOSPHATE SERPL-MCNC: 2 MG/DL (ref 2.5–4.9)
PLATELET # BLD AUTO: 101 X10*3/UL (ref 150–450)
POTASSIUM SERPL-SCNC: 4 MMOL/L (ref 3.5–5.3)
PROT UR STRIP.AUTO-MCNC: NORMAL MG/DL
RBC # BLD AUTO: 3.14 X10*6/UL (ref 4–5.2)
RBC # UR STRIP.AUTO: NEGATIVE MG/DL
RBC #/AREA URNS AUTO: NORMAL /HPF
RBC MORPH BLD: ABNORMAL
SODIUM SERPL-SCNC: 137 MMOL/L (ref 136–145)
SP GR UR STRIP.AUTO: 1.01
SQUAMOUS #/AREA URNS AUTO: NORMAL /HPF
TOTAL CELLS COUNTED BLD: 115
UROBILINOGEN UR STRIP.AUTO-MCNC: NORMAL MG/DL
WBC # BLD AUTO: 2.6 X10*3/UL (ref 4.4–11.3)
WBC #/AREA URNS AUTO: NORMAL /HPF

## 2025-05-13 PROCEDURE — 83735 ASSAY OF MAGNESIUM: CPT

## 2025-05-13 PROCEDURE — 2500000004 HC RX 250 GENERAL PHARMACY W/ HCPCS (ALT 636 FOR OP/ED): Mod: JZ | Performed by: NURSE PRACTITIONER

## 2025-05-13 PROCEDURE — 85007 BL SMEAR W/DIFF WBC COUNT: CPT

## 2025-05-13 PROCEDURE — 84520 ASSAY OF UREA NITROGEN: CPT

## 2025-05-13 PROCEDURE — 85027 COMPLETE CBC AUTOMATED: CPT

## 2025-05-13 PROCEDURE — RXMED WILLOW AMBULATORY MEDICATION CHARGE

## 2025-05-13 PROCEDURE — 99239 HOSP IP/OBS DSCHRG MGMT >30: CPT

## 2025-05-13 PROCEDURE — 2500000004 HC RX 250 GENERAL PHARMACY W/ HCPCS (ALT 636 FOR OP/ED)

## 2025-05-13 PROCEDURE — 81001 URINALYSIS AUTO W/SCOPE: CPT

## 2025-05-13 PROCEDURE — 2500000001 HC RX 250 WO HCPCS SELF ADMINISTERED DRUGS (ALT 637 FOR MEDICARE OP)

## 2025-05-13 PROCEDURE — 2500000002 HC RX 250 W HCPCS SELF ADMINISTERED DRUGS (ALT 637 FOR MEDICARE OP, ALT 636 FOR OP/ED)

## 2025-05-13 RX ORDER — HEPARIN 100 UNIT/ML
1 SYRINGE INTRAVENOUS ONCE
Status: COMPLETED | OUTPATIENT
Start: 2025-05-13 | End: 2025-05-13

## 2025-05-13 RX ORDER — CIPROFLOXACIN 500 MG/1
500 TABLET, FILM COATED ORAL 2 TIMES DAILY
Qty: 10 TABLET | Refills: 0 | Status: SHIPPED | OUTPATIENT
Start: 2025-05-13 | End: 2025-05-20 | Stop reason: HOSPADM

## 2025-05-13 RX ADMIN — CEFTRIAXONE SODIUM 1 G: 1 INJECTION, SOLUTION INTRAVENOUS at 12:31

## 2025-05-13 RX ADMIN — HEPARIN 100 UNITS: 100 SYRINGE at 14:51

## 2025-05-13 RX ADMIN — OXYBUTYNIN CHLORIDE 5 MG: 5 TABLET ORAL at 08:46

## 2025-05-13 RX ADMIN — PANTOPRAZOLE SODIUM 40 MG: 40 TABLET, DELAYED RELEASE ORAL at 05:40

## 2025-05-13 RX ADMIN — OXYBUTYNIN CHLORIDE 5 MG: 5 TABLET ORAL at 14:51

## 2025-05-13 ASSESSMENT — COGNITIVE AND FUNCTIONAL STATUS - GENERAL
DAILY ACTIVITIY SCORE: 24
MOBILITY SCORE: 24

## 2025-05-13 ASSESSMENT — PAIN - FUNCTIONAL ASSESSMENT
PAIN_FUNCTIONAL_ASSESSMENT: 0-10
PAIN_FUNCTIONAL_ASSESSMENT: 0-10

## 2025-05-13 ASSESSMENT — PAIN SCALES - GENERAL
PAINLEVEL_OUTOF10: 0 - NO PAIN
PAINLEVEL_OUTOF10: 0 - NO PAIN

## 2025-05-13 NOTE — PROGRESS NOTES
Occupational Therapy                 Therapy Communication Note- OT SCREEN    Patient Name: Radha Shabazz  MRN: [id number]  Department: [facility]  Room: 6021/6021-A  Today's Date: 5/13/2025     Discipline: Occupational Therapy          Missed Visit Reason: Missed Visit Reason:  (OT Screen- pt is IND with mobility and ADLs at this time, no acute OT needs. Will discharge OT orders.)

## 2025-05-13 NOTE — DISCHARGE SUMMARY
Discharge Diagnosis  Nausea and vomiting    Issues Requiring Follow-Up  Breast cancer  UTI    Test Results Pending At Discharge  Pending Labs       Order Current Status    Extra Urine Gray Tube Collected (05/13/25 0950)    Urinalysis with Reflex Culture and Microscopic Collected (05/13/25 0950)    Urinalysis with Reflex Culture and Microscopic Collected (05/13/25 0950)    Blood Culture Preliminary result    Blood Culture Preliminary result            Hospital Course   Radha Shabazz is a 69 y.o. female with PMHx of breast cancer S/P R mastectomy, adjuvant chemotherapy, currently on abemaciclib/anastrozole for 2 years (planned completion on 7/2025), history of chemotherapy-induced cardiotoxicity, advanced Rt knee osteoarthritis pending replacement, and bilateral renal stones, presenting with generalized fatigue and vomiting with chills x3 days associated with headache. Pt with leukopenia (WBC 2.3k) on admit. sCr 1.19 (5/10) s/p 500mL LR followed by 1.5L LR 5/11 with improvement. UA (5/10) +leuks, 2+ nitrites. Ucx was not collected. CXR (5/10) negative for infectious rocess. CT head (5/10) negative for acute intracranial process. Pt febrile 5/11, started CTX (5/11- current) d/t fever I/s/o leukopenia/immunosuppression and hx b/l non-obstructing renal stones. Repeat UA and urine culture sent on 5/13 and pending on day of discharge as culture not sent with initial UA. Will plan to treat patient with gram negative UTI and plan to DC with PO ciprofloxacin 500mg BID (5/13-planned stop 5/18) with a total antibiotic course of 7 days.     PO Ciprofloxacin 500mg BID x 5 days (10T) delivered to patient's bedside by [facility] pharmacy.     Pertinent Physical Exam At Time of Discharge  Physical Exam  Constitutional:       Appearance: Normal appearance.   HENT:      Head: Normocephalic.      Mouth/Throat:      Mouth: Mucous membranes are moist.   Eyes:      Pupils: Pupils are equal, round, and reactive to light.   Cardiovascular:       Rate and Rhythm: Normal rate and regular rhythm.      Pulses: Normal pulses.      Heart sounds: Normal heart sounds.   Pulmonary:      Effort: Pulmonary effort is normal.      Breath sounds: Normal breath sounds.   Abdominal:      General: Abdomen is flat. Bowel sounds are normal.      Palpations: Abdomen is soft.   Musculoskeletal:         General: Normal range of motion.      Cervical back: Normal range of motion and neck supple.   Skin:     General: Skin is warm.   Neurological:      General: No focal deficit present.      Mental Status: She is alert.   Psychiatric:         Mood and Affect: Mood normal.         Home Medications     Medication List      START taking these medications     ciprofloxacin 500 mg tablet; Commonly known as: Cipro; Take 1 tablet   (500 mg) by mouth 2 times a day for 5 days.     CONTINUE taking these medications     * anastrozole 1 mg tablet; Commonly known as: Arimidex; Take 1 tablet (1   mg total) by mouth once daily.  Swallow whole with a drink of water.   * anastrozole 1 mg tablet; Commonly known as: Arimidex; Take 1 tablet (1   mg total) by mouth once daily.  Swallow whole with a drink of water.   Durolane 60 mg/3 mL injection; Generic drug: sodium hyaluronate; Inject   one syringe(60mg/3mL) into right knee one time at doctors office   escitalopram 20 mg tablet; Commonly known as: Lexapro   nitrofurantoin (macrocrystal-monohydrate) 100 mg capsule; Commonly known   as: Macrobid; Take 1 capsule (100 mg) by mouth every 12 hours.   omeprazole 20 mg DR capsule; Commonly known as: PriLOSEC   rosuvastatin 20 mg tablet; Commonly known as: Crestor; Take 1 tablet (20   mg) by mouth once daily.   solifenacin 10 mg tablet; Commonly known as: Vesicare; Take 1 tablet (10   mg) by mouth once daily. Swallow tablet whole; do not crush, chew, or   split.   topiramate 25 mg tablet; Commonly known as: Topamax   UNABLE TO FIND   UNABLE TO FIND   VERZENIO ORAL  * This list has 2 medication(s) that are  the same as other medications   prescribed for you. Read the directions carefully, and ask your doctor or   other care provider to review them with you.       Outpatient Follow-Up  Future Appointments   Date Time Provider Department [place]   5/20/2025 10:30 AM Masoud Meléndez MD [id number] [place]   5/21/2025  8:45 AM WALTER Ruiz [id number] [facility]   6/16/2025  9:00 AM [facility] MARNIE 3 CMCAHUMAM AHU Rad   6/16/2025 10:00 AM Anna Olson MD [id number] [facility]   7/9/2025  9:00 AM [employer] [address]   7/9/2025 12:30 PM ISSA PAT ROOM 02 [facility]   9/22/2025  9:30 AM INF 12 MINOFF [id number] [facility]   11/21/2025  1:30 PM Windy Borja APRN-CNP [id number] [facility]   3/30/2026  1:20 PM Kaelyn Ward MD [id number] [facility]   4/13/2026 10:50 AM MIN ECHO [id number] [facility]   4/16/2026 10:30 AM JUAN Flores-CNP [id number] [facility]       Discharge discussed with attending physician, Dr. Garvey     I spent >30 minutes in the discharge of this patient    Maru Pretty PA-C

## 2025-05-13 NOTE — CARE PLAN
The patient's goals for the shift include discharge home    The clinical goals for the shift include patient will remain Hds  Over the shift, the patient did not make progress toward the following goals.     Problem: Pain - Adult  Goal: Verbalizes/displays adequate comfort level or baseline comfort level  Outcome: Progressing     Problem: Safety - Adult  Goal: Free from fall injury  Outcome: Progressing     Problem: Discharge Planning  Goal: Discharge to home or other facility with appropriate resources  Outcome: Progressing     Problem: Chronic Conditions and Co-morbidities  Goal: Patient's chronic conditions and co-morbidity symptoms are monitored and maintained or improved  Outcome: Progressing     Problem: Nutrition  Goal: Nutrient intake appropriate for maintaining nutritional needs  Outcome: Progressing

## 2025-05-14 ENCOUNTER — DOCUMENTATION (OUTPATIENT)
Dept: INTERNAL MEDICINE | Facility: HOSPITAL | Age: 70
End: 2025-05-14
Payer: COMMERCIAL

## 2025-05-14 LAB
BACTERIA BLD CULT: NORMAL
BACTERIA BLD CULT: NORMAL
HOLD SPECIMEN: 293

## 2025-05-20 ENCOUNTER — HOSPITAL ENCOUNTER (OUTPATIENT)
Dept: PAIN MEDICINE | Facility: CLINIC | Age: 70
Discharge: HOME | End: 2025-05-20
Payer: COMMERCIAL

## 2025-05-20 VITALS
DIASTOLIC BLOOD PRESSURE: 77 MMHG | RESPIRATION RATE: 18 BRPM | SYSTOLIC BLOOD PRESSURE: 118 MMHG | OXYGEN SATURATION: 100 % | HEART RATE: 63 BPM | TEMPERATURE: 96.9 F

## 2025-05-20 DIAGNOSIS — M25.561 CHRONIC PAIN OF RIGHT KNEE: ICD-10-CM

## 2025-05-20 DIAGNOSIS — G89.29 CHRONIC PAIN OF RIGHT KNEE: ICD-10-CM

## 2025-05-20 PROCEDURE — 99152 MOD SED SAME PHYS/QHP 5/>YRS: CPT | Performed by: PAIN MEDICINE

## 2025-05-20 PROCEDURE — 2720000007 HC OR 272 NO HCPCS

## 2025-05-20 PROCEDURE — 2500000004 HC RX 250 GENERAL PHARMACY W/ HCPCS (ALT 636 FOR OP/ED): Mod: JZ | Performed by: PAIN MEDICINE

## 2025-05-20 PROCEDURE — 64624 DSTRJ NULYT AGT GNCLR NRV: CPT | Mod: 59 | Performed by: PAIN MEDICINE

## 2025-05-20 RX ORDER — MIDAZOLAM HYDROCHLORIDE 1 MG/ML
INJECTION, SOLUTION INTRAMUSCULAR; INTRAVENOUS AS NEEDED
Status: COMPLETED | OUTPATIENT
Start: 2025-05-20 | End: 2025-05-20

## 2025-05-20 RX ORDER — BUPIVACAINE HYDROCHLORIDE 5 MG/ML
INJECTION, SOLUTION PERINEURAL AS NEEDED
Status: COMPLETED | OUTPATIENT
Start: 2025-05-20 | End: 2025-05-20

## 2025-05-20 RX ORDER — LIDOCAINE HYDROCHLORIDE 10 MG/ML
INJECTION, SOLUTION EPIDURAL; INFILTRATION; INTRACAUDAL; PERINEURAL AS NEEDED
Status: COMPLETED | OUTPATIENT
Start: 2025-05-20 | End: 2025-05-20

## 2025-05-20 RX ADMIN — BUPIVACAINE HYDROCHLORIDE 10 ML: 5 INJECTION, SOLUTION PERINEURAL at 10:12

## 2025-05-20 RX ADMIN — MIDAZOLAM 2 MG: 1 INJECTION INTRAMUSCULAR; INTRAVENOUS at 10:16

## 2025-05-20 RX ADMIN — LIDOCAINE HYDROCHLORIDE 10 ML: 10 INJECTION, SOLUTION EPIDURAL; INFILTRATION; INTRACAUDAL; PERINEURAL at 10:12

## 2025-05-20 ASSESSMENT — PAIN DESCRIPTION - DESCRIPTORS: DESCRIPTORS: ACHING;BURNING;SHARP

## 2025-05-20 ASSESSMENT — PAIN - FUNCTIONAL ASSESSMENT: PAIN_FUNCTIONAL_ASSESSMENT: 0-10

## 2025-05-20 ASSESSMENT — PAIN SCALES - GENERAL: PAINLEVEL_OUTOF10: 7

## 2025-05-20 NOTE — DISCHARGE INSTRUCTIONS
Post-injection instructions FOR Radiofrequency Ablation    Your radiofrequency ablation was completed today is not unusual to have some exacerbation of the pain before you get better.  Radiofrequency ablation takes an average of 2 to 3 weeks before it completely starts showing full results      Activity:  RETURN TO NORMAL ACTIVITY once the sedation is completely worn off do not sign any legal document for the first 24 hours do not drive or operate machinery for the first 24 hours until the sedation effect is completely worn off    Bandages: Remove after 24 hours     Showering/Bathing: You may shower after bandage is removed     May use ice at the site of the injection for the first 24 hours      Call the OFFICE immediately: if you notice:     Excessive bleeding from procedure site (brisk bright red bleeding from the site or bleeding that soaks the bandages or does not stop)   Severe headache  Inability to walk, leg or arm weakness or numbness that is worse after the procedure   Uncontrolled pain   New urinary or fecal incontinence   Signs of infection: Fever above 101.5F, redness, swelling, pus or drainage from the site    Please contact the pain clinic at [phone]  in 3 weeks to report results or with any further questions or concerns

## 2025-05-20 NOTE — DOCUMENTATION CLARIFICATION NOTE
"    PATIENT:               SHABBIR DOS SANTOS  ACCT #:                  [id number]  MRN:                       [id number]  :                       1955  ADMIT DATE:       5/10/2025 1:25 PM  DISCH DATE:        2025 4:33 PM  RESPONDING PROVIDER #:        [id number]          PROVIDER RESPONSE TEXT:    Patient treated for UTI without Sepsis    CDI QUERY TEXT:    Clarification    Instruction:  Based on your assessment of the patient and the clinical information, please provide the requested documentation by clicking on the appropriate radio button and enter any additional information if prompted.    Question: Is there a diagnosis indicative of a patient meeting SIRS criteria and with organ dysfunction in the setting of UTI    When answering this query, please exercise your independent professional judgment. The fact that a question is being asked, does not imply that any particular answer is desired or expected.    The patient's clinical indicators include:  Clinical Information: 68 y/o with breast cancer and currently immunocompromised due to cancer therapy presented with  fatigued, chills, nausea/vomiting. and found to have a UTI    Clinical Indicators:    5/10/25 - 25 Labs  WBC 2.3, 2.1, 2.7, and 2.6  , 90, 91, and 101  RBC 4.50, 3.22, 3.16, and 3.14  Glucose 106, 119, 112, and 107    5/10/25 and 25 VS  Pulse 92, 69, 65, 99, 76, 64, and 72  Temperature 99.4, 98.3, 98.1, 102.9, 100.4, 98.8, and 100.1  /71, 95/60, 93/61, 131/81, 100/60 95/62, and 105/65    Per notes the patient has been persistently leukopenic around 2.3-2.7    5/10/25 Significant event note [place] \"Thrombocytopenia (baseline normal; admit 111)\"    25 PN [place] \"Thrombocytopenia  - Likely 2/2 acute infectious process\"    25 DC summary [place] \"UTI\" and \"Pt febrile , started CTX (- current) d/t fever I/s/o leukopenia/immunosuppression and hx b/l non-obstructing renal stones.\"      Treatment:  -Lactated Ringer's " bolus 1,000 mL once then 500 ML bolus once  -Sodium chloride 0.9 % bolus 1,000 mL once x 2  -Acetaminophen (Tylenol) tablet 975 mg 3 times a day PRN x 5 doses  -Ceftriaxone (Rocephin) 1 g in dextrose (iso) IV 50 mL every 24 hours x 3 doses  -Repeat labs  -Repeat VS    Risk Factors: Breast cancer on treatments, immunocompromised, and UTI  Options provided:  -- Sepsis with multi-system organ dysfunction of Endocrine with hyperglycemia absent a diagnosis for DM and Hematological with thrombocytopenia, present on admission  -- Sepsis with multi-system organ dysfunction of Endocrine with hyperglycemia absent a diagnosis for DM and Hematological with thrombocytopenia, not present on admission  -- Sepsis with hematological organ dysfunction of with thrombocytopenia, present on admission  -- Sepsis with hematological organ dysfunction of with thrombocytopenia, not present on admission  -- Sepsis with endocrine organ dysfunction of hyperglycemia absent a diagnosis for DM, present on admission  -- Sepsis with endocrine organ dysfunction of hyperglycemia absent a diagnosis for DM, not present on admssion  -- Sepsis with other organ dysfunction, Please specify sepsis associated organ dysfunction below  -- Patient treated for UTI without Sepsis  -- Other - I will add my own diagnosis  -- Refer to Clinical Documentation Reviewer    Query created by: Galo Ramirez on 5/20/2025 12:44 PM      Electronically signed by:  RACHEL STEINBERG PA-C 5/20/2025 2:06 PM

## 2025-05-21 ENCOUNTER — OFFICE VISIT (OUTPATIENT)
Dept: OBSTETRICS AND GYNECOLOGY | Facility: CLINIC | Age: 70
End: 2025-05-21
Payer: COMMERCIAL

## 2025-05-21 VITALS
HEIGHT: 66 IN | SYSTOLIC BLOOD PRESSURE: 107 MMHG | WEIGHT: 193.4 LBS | BODY MASS INDEX: 31.08 KG/M2 | HEART RATE: 76 BPM | DIASTOLIC BLOOD PRESSURE: 72 MMHG

## 2025-05-21 DIAGNOSIS — N32.81 OVERACTIVE BLADDER: ICD-10-CM

## 2025-05-21 DIAGNOSIS — N39.0 RECURRENT UTI: ICD-10-CM

## 2025-05-21 DIAGNOSIS — Z01.419 WELL WOMAN EXAM: Primary | ICD-10-CM

## 2025-05-21 PROCEDURE — 99397 PER PM REEVAL EST PAT 65+ YR: CPT | Performed by: NURSE PRACTITIONER

## 2025-05-21 PROCEDURE — 1159F MED LIST DOCD IN RCRD: CPT | Performed by: NURSE PRACTITIONER

## 2025-05-21 PROCEDURE — 1111F DSCHRG MED/CURRENT MED MERGE: CPT | Performed by: NURSE PRACTITIONER

## 2025-05-21 PROCEDURE — 1126F AMNT PAIN NOTED NONE PRSNT: CPT | Performed by: NURSE PRACTITIONER

## 2025-05-21 PROCEDURE — 3074F SYST BP LT 130 MM HG: CPT | Performed by: NURSE PRACTITIONER

## 2025-05-21 PROCEDURE — 3078F DIAST BP <80 MM HG: CPT | Performed by: NURSE PRACTITIONER

## 2025-05-21 PROCEDURE — 3008F BODY MASS INDEX DOCD: CPT | Performed by: NURSE PRACTITIONER

## 2025-05-21 RX ORDER — NITROFURANTOIN 25; 75 MG/1; MG/1
100 CAPSULE ORAL EVERY 12 HOURS SCHEDULED
Qty: 14 CAPSULE | Refills: 1 | Status: SHIPPED | OUTPATIENT
Start: 2025-05-21

## 2025-05-21 RX ORDER — SOLIFENACIN SUCCINATE 10 MG/1
10 TABLET, FILM COATED ORAL DAILY
Qty: 90 TABLET | Refills: 3 | Status: SHIPPED | OUTPATIENT
Start: 2025-05-21 | End: 2026-05-21

## 2025-05-21 ASSESSMENT — ENCOUNTER SYMPTOMS
CARDIOVASCULAR NEGATIVE: 1
EYES NEGATIVE: 1
MUSCULOSKELETAL NEGATIVE: 1
GASTROINTESTINAL NEGATIVE: 1
ENDOCRINE NEGATIVE: 1
PSYCHIATRIC NEGATIVE: 1
CONSTITUTIONAL NEGATIVE: 1
NEUROLOGICAL NEGATIVE: 1
RESPIRATORY NEGATIVE: 1

## 2025-05-21 ASSESSMENT — PAIN SCALES - GENERAL: PAINLEVEL_OUTOF10: 0-NO PAIN

## 2025-05-21 NOTE — PROGRESS NOTES
Subjective   Patient ID: Radha Shabazz is a 69 y.o. female who presents for Follow-up (Pelvic exam).    HPI  69 year old female presenting for annual pelvic exam and to follow up on OAB.     Urinary Symptoms:  - Patient continues to take Solifenacin 10mg daily and this has optimized her bladder control with only rare UUI episodes but overall this medication has significantly reduced the volume and frequency of her UUI leakage.   - She was recently hospitalized with N/V due to having a UTI but did not experience LUTS at that time.     Health Maintenance:  - Last pap on 4/3/2024 that returned NILM and was HPV negative. The patient no longer needs paps as she is >65 years old.   - Her last mammogram was on 12/23/2024 that returned BI-RADS category 3 - probably benign. She is followed by oncology for routine MMG due to having a hx of right ER+ breast cancer s/p chemotherapy/RT and is currently on Anastrozole for estrogen suppression.   - Her history of kidney stones are managed by Dr. Kaelyn Ward in urology.   - Hx of head/neck cancer of the tongue.        Review of Systems   Constitutional: Negative.    HENT: Negative.     Eyes: Negative.    Respiratory: Negative.     Cardiovascular: Negative.    Gastrointestinal: Negative.    Endocrine: Negative.    Genitourinary: Negative.    Musculoskeletal: Negative.    Neurological: Negative.    Psychiatric/Behavioral: Negative.         Objective   Physical Exam  Constitutional:       General: She is not in acute distress.     Appearance: Normal appearance.   Genitourinary:      Vulva, bladder and urethral meatus normal.      No lesions in the vagina.      Right Labia: No lesions.     Left Labia: No lesions.     No vaginal discharge or bleeding.      Moderate vaginal atrophy present.       Right Adnexa: not tender and no mass present.     Left Adnexa: not tender and no mass present.     No cervical motion tenderness.      Uterus is not enlarged, fixed or tender.      No uterine  mass detected.     No urethral tenderness or mass present.      Bladder is not tender.    Rectum:      No external hemorrhoid.   HENT:      Head: Normocephalic and atraumatic.   Pulmonary:      Effort: Pulmonary effort is normal.   Psychiatric:         Mood and Affect: Mood normal.         Behavior: Behavior normal.         Thought Content: Thought content normal.         Judgment: Judgment normal.           Assessment/Plan   69 year old female with OAB/UUI, hx of AMH with UA microscopy on 5/10/2025 showed 11-20 RBC/HPF, hx of kidney stones, and hx of Angie. Comorbidities include: HTN, h/o head/neck cancer of the tongue, and personal hx of ER+ breast cancer in the right breast s/p right mastectomy, chemotherapy and RT, currently on Anastrozole for estrogen suppression.     Diagnoses:  #1 Overactive bladder  #2 Urge urinary incontinence   #3 Well woman exam   #4 Recurrent UTI  #5 History of kidney stones - followed by Dr. Ward in urology     Plan:  1. OAB, UUI  - PVR = 10mL by bladder U/S.  - We sent Rx renewal for her to continue taking Vesicare 10mg daily as this has adequately managed her OAB symptoms with reduced severity of urinary urgency and less UUI episodes.     2. WWE  - Normal pelvic exam with no abnormal findings.   - Last pap on 4/3/2024 that returned NILM and was HPV negative. The patient no longer needs paps as she is >65 years old.   - Her last mammogram was on 12/23/2024 that returned BI-RADS category 3 - probably benign. She is followed by oncology for routine MMG due to having a hx of right ER+ breast cancer.     3. Angie, AMH, kidney stones  - The UA microscopy on 5/10/2025 showed 11-20 RBC/HPF and has a history of kidney stones followed by Dr. Ward in urology.   - Patient was recently hospitalized on 5/10/2025 for a UTI with 2+ nitrites on UA and was asymptomatic without LUTS but was febrile with N/V; treated with IV antibiotics and discharged to take po Ciprofloxacin BID x7 days.  - Sent Rx of  Macrobid 100mg with instructions to take 1 pill po BID x7 days to take if she has a UTI while traveling.     Follow up in 1 year with WALTER Ruiz for an annual well woman exam.     Scribe Attestation  By signing my name below, I, Reynaldo Sen, attest that this documentation has been prepared under the direction and in the presence of WALTER Ruiz on 05/21/2025 at 10:49 AM.       WALTER Ruiz 05/21/25 9:05 AM

## 2025-06-13 ENCOUNTER — LAB (OUTPATIENT)
Dept: LAB | Facility: CLINIC | Age: 70
End: 2025-06-13
Payer: COMMERCIAL

## 2025-06-13 DIAGNOSIS — C50.211 MALIGNANT NEOPLASM OF UPPER-INNER QUADRANT OF RIGHT BREAST IN FEMALE, ESTROGEN RECEPTOR POSITIVE: ICD-10-CM

## 2025-06-13 DIAGNOSIS — M85.80 OSTEOPENIA, UNSPECIFIED LOCATION: ICD-10-CM

## 2025-06-13 DIAGNOSIS — Z17.0 MALIGNANT NEOPLASM OF UPPER-INNER QUADRANT OF RIGHT BREAST IN FEMALE, ESTROGEN RECEPTOR POSITIVE: ICD-10-CM

## 2025-06-13 LAB
BASOPHILS # BLD AUTO: 0.02 X10*3/UL (ref 0–0.1)
BASOPHILS NFR BLD AUTO: 0.6 %
EOSINOPHIL # BLD AUTO: 0.17 X10*3/UL (ref 0–0.7)
EOSINOPHIL NFR BLD AUTO: 5.5 %
ERYTHROCYTE [DISTWIDTH] IN BLOOD BY AUTOMATED COUNT: 12.8 % (ref 11.5–14.5)
HCT VFR BLD AUTO: 36 % (ref 36–46)
HGB BLD-MCNC: 12.3 G/DL (ref 12–16)
IMM GRANULOCYTES # BLD AUTO: 0 X10*3/UL (ref 0–0.7)
IMM GRANULOCYTES NFR BLD AUTO: 0 % (ref 0–0.9)
LYMPHOCYTES # BLD AUTO: 0.88 X10*3/UL (ref 1.2–4.8)
LYMPHOCYTES NFR BLD AUTO: 28.4 %
MCH RBC QN AUTO: 34.4 PG (ref 26–34)
MCHC RBC AUTO-ENTMCNC: 34.2 G/DL (ref 32–36)
MCV RBC AUTO: 101 FL (ref 80–100)
MONOCYTES # BLD AUTO: 0.19 X10*3/UL (ref 0.1–1)
MONOCYTES NFR BLD AUTO: 6.1 %
NEUTROPHILS # BLD AUTO: 1.84 X10*3/UL (ref 1.2–7.7)
NEUTROPHILS NFR BLD AUTO: 59.4 %
NRBC BLD-RTO: ABNORMAL /100{WBCS}
PLATELET # BLD AUTO: 164 X10*3/UL (ref 150–450)
RBC # BLD AUTO: 3.58 X10*6/UL (ref 4–5.2)
WBC # BLD AUTO: 3.1 X10*3/UL (ref 4.4–11.3)

## 2025-06-13 PROCEDURE — 85025 COMPLETE CBC W/AUTO DIFF WBC: CPT

## 2025-06-13 PROCEDURE — 36415 COLL VENOUS BLD VENIPUNCTURE: CPT

## 2025-06-13 PROCEDURE — 83735 ASSAY OF MAGNESIUM: CPT

## 2025-06-13 PROCEDURE — 84100 ASSAY OF PHOSPHORUS: CPT

## 2025-06-13 PROCEDURE — 80053 COMPREHEN METABOLIC PANEL: CPT

## 2025-06-13 PROCEDURE — 82306 VITAMIN D 25 HYDROXY: CPT

## 2025-06-14 LAB
25(OH)D3 SERPL-MCNC: 46 NG/ML (ref 30–100)
ALBUMIN SERPL BCP-MCNC: 4.5 G/DL (ref 3.4–5)
ALP SERPL-CCNC: 49 U/L (ref 33–136)
ALT SERPL W P-5'-P-CCNC: 18 U/L (ref 7–45)
ANION GAP SERPL CALC-SCNC: 15 MMOL/L (ref 10–20)
AST SERPL W P-5'-P-CCNC: 20 U/L (ref 9–39)
BILIRUB SERPL-MCNC: 0.6 MG/DL (ref 0–1.2)
BUN SERPL-MCNC: 21 MG/DL (ref 6–23)
CALCIUM SERPL-MCNC: 10.8 MG/DL (ref 8.6–10.6)
CHLORIDE SERPL-SCNC: 106 MMOL/L (ref 98–107)
CO2 SERPL-SCNC: 21 MMOL/L (ref 21–32)
CREAT SERPL-MCNC: 1.3 MG/DL (ref 0.5–1.05)
EGFRCR SERPLBLD CKD-EPI 2021: 45 ML/MIN/1.73M*2
GLUCOSE SERPL-MCNC: 144 MG/DL (ref 74–99)
MAGNESIUM SERPL-MCNC: 2.42 MG/DL (ref 1.6–2.4)
PHOSPHATE SERPL-MCNC: 2.7 MG/DL (ref 2.5–4.9)
POTASSIUM SERPL-SCNC: 4.1 MMOL/L (ref 3.5–5.3)
PROT SERPL-MCNC: 6.9 G/DL (ref 6.4–8.2)
SODIUM SERPL-SCNC: 138 MMOL/L (ref 136–145)

## 2025-06-15 NOTE — PROGRESS NOTES
Patient Visit Information:   Patient name: Radha Shabazz  : 1955  Date of Service: 2025    Visit Type: Follow-up      Cancer History:          Breast         AJCC Edition: 8th (AJCC), Diagnosis Date:           Cancer Staging   No matching staging information was found for the patient.       Treatment Synopsis:       Radha Shabazz is a 69 y.o. 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 The patient's breast cancer was diagnosed on 2021, and was grade 2, estrogen receptor positive at >95%/>95%, progesterone receptor positive at >95%/>95%%, and HER-2/berta negative.  After neoadjuvant endocrine therapy (letrozole), final path after right mastectomy showed a 5.2 cm mass with 3/8 macrometastatic LNs plus one LN with isolated tumor cell. Size of largest LN was 0.3cm. ([id number])    MammaPrint Index = +0.142; translating to Low Risk Luminal-Type A.      CURRENT THERAPY: Adjuvant Arimidex and Abemaciclib, Denosomab     ONCOLOGIC HISTORY:  Details of her breast cancer history are as follows:      2021: Patient underwent a screening mammogram. This showed a focal asymmetry in the central aspect of the right breast   12/15/2021: Patient underwent a right diagnostic mammogram and breast US. This revealed a mass at the 1:00, 6 cm from the nipple measuring 1.7 x 1.2 x 1.1 cm. Another mass measuring 0.5 cm was seen at 5:00, 4 cm from the nipple. Right axilla US revealed  3 unremarkable LNs. Breast tissue was extremely dense.   2021: Patient underwent US-guided core biopsies from two sites in the right breast, from 1:00, 6 cm from the nipple and from 9:00, 9 cm from the nipple. Pathology revealed above results.   MRI of the breast was attempted but patient could not tolerate it   2022: Patient was started on Letrozole 2.5 mg by mouth daily    4/15/2022: Completed a mid treatment US. This showed a stable to slight interval decrease in size of biopsy proven carcinoma.    06/13/2022: Completed right breast US. This showed interval decrease in size of biopsy proven carcinoma.   09/16/2022: Patient underwent a right mastectomy with SNLB and immediate reconstruction. Pathology revealed a 5.2 cm mass with 3/8 macrometastatic LNs plus one LN with isolated tumor cell. Size of largest LN was 0.3cm. ([id number])   09/17/2022: Evacuation of hematoma with 750 cc of estimated blood loss   10/06/2022: Revision of right reconstructed breast   12/02/2022: Received the first dose of AC   1/13/2023: Received the 4th and last dose of AC after which chemotherapy was discontinued due to side effects and hospitalization. Patient therefore did not receive any Taxane   04/07/2023: Started Exemestane   04/13/2023: Completed radiation   06/30/2023: Exemestane discontinued   06/30/2023: Started Anastrozole  July 2023: Started Abemaciclib  (getting 's supply - [facility])  3/6/2024: Started Zometa (every 24 weeks)  8/26/2024: Last dose of Zometa   10/9/2024: Her creatinine was noted to get worse. Switched from Zometa to Denosumab (every 24 weeks)  11/8/2024: Left pain X-ray for hip pain - degenerative changes  6/16/2025: Mammogram - BI-RADS Category: 3 Probably Benign.     History of Present Illness: Patient ID:  Radha Shabazz is a 70 y.o. female.     Chief Complaint and/or reason for visit: Here for a follow-up     Interval History:    Radha Shabazz is a pleasant  69 y.o. 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 The patient's breast cancer was diagnosed on December 21, 2021, and was grade 2,  estrogen receptor positive at >95%/>95%, progesterone receptor positive at >95%/>95%%, and HER-2/berta negative.  MammaPrint  Index =  +0.142; translating to Low Risk Luminal-Type A.   After neoadjuvant endocrine therapy (letrozole), final path after right mastectomy showed a 5.2 cm mass with 3/8 macrometastatic LNs plus one LN with isolated tumor cell. Size of largest LN was 0.3cm. ([id number])     Here for a follow-up.     She states that    Hip pain (X-ray on 11/8/224 showing generative disease) has resolved.   Knee pain (right worse than left) is stale but severe (8-9/10). Knee surgery is scheduled for 7/2/2025 after completing 2 yrs of abemaciclib.     SOB upon exertion is stable (occurs upon going up stairs).    She is a nurse (pain management), works 2 days a week.        She denies fever, chills, Wt loss, headaches, CP, N/V, abdominal pain, constipation, diarrhea, neuropathy, extremity swelling, rashes. Appetite is good and she is drinking fine.     Review of Systems:   A 14-point review of system was completed and was negative except for what is noted in HPI.      Allergies and Intolerances:       Allergies:   Allergies   Allergen Reactions    Iodine Hives    Iodinated Contrast Media Rash, Swelling and Unknown    Sulfa (Sulfonamide Antibiotics) Rash, Swelling, Unknown and Hives             Outpatient Medication Profile:   Current Outpatient Medications on File Prior to Visit   Medication Sig Dispense Refill    abemaciclib (VERZENIO ORAL) Take 1 tablet by mouth 2 times a day.      escitalopram (Lexapro) 20 mg tablet Take 0.5 tablets (10 mg) by mouth once daily.      nitrofurantoin, macrocrystal-monohydrate, (Macrobid) 100 mg capsule Take 1 capsule (100 mg) by mouth every 12 hours. 14 capsule 1    omeprazole (PriLOSEC) 20 mg DR capsule Take 1 capsule (20 mg) by mouth once daily.      rosuvastatin (Crestor) 20 mg tablet Take 1 tablet (20 mg) by mouth once daily. 90 tablet 3    sodium hyaluronate (Durolane) 60 mg/3 mL injection Inject one syringe(60mg/3mL) into right knee one time at doctors office 3 mL 0    solifenacin (Vesicare) 10 mg tablet  Take 1 tablet (10 mg) by mouth once daily. Swallow tablet whole; do not crush, chew, or split. 90 tablet 3    topiramate (Topamax) 25 mg tablet Take 1 tablet (25 mg) by mouth once daily as needed.      UNABLE TO FIND Breast prosthesis for left and/or right breast      UNABLE TO FIND Mastectomy Bra for Right Mastectomy      [DISCONTINUED] anastrozole (Arimidex) 1 mg tablet Take 1 tablet (1 mg total) by mouth once daily.  Swallow whole with a drink of water. 90 tablet 3    [DISCONTINUED] anastrozole (Arimidex) 1 mg tablet Take 1 tablet (1 mg total) by mouth once daily.  Swallow whole with a drink of water. 90 tablet 1     No current facility-administered medications on file prior to visit.       Medical History:    Past Medical History:   Diagnosis Date    Arthritis 5 years ago    BPH (benign prostatic hyperplasia) 10 years ago    I have since brought my BP down. No need for meds    Breast cancer     Depression 20 years ago    Encounter for gynecological examination (general) (routine) without abnormal findings     Encounter for gynecological examination without abnormal finding    Encounter for screening mammogram for malignant neoplasm of breast     Visit for screening mammogram    GERD (gastroesophageal reflux disease) 20 years ago    Head and neck cancer (Multi) April 2020    History of transfusion     Hyperlipidemia April 2025    Hypertension 15 years ago    Irritable bowel syndrome When i was 19 years old    Kidney stone     Obesity     I have been over weight  for several years    Personal history of irradiation     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    Personal history of other diseases of the circulatory system     History of hypertension    Urinary tract infection 40 years ago       Surgical History:   Past Surgical History:   Procedure Laterality Date    AMPUTATION      APPENDECTOMY  10/05/2015    Appendectomy    BREAST BIOPSY      HYSTERECTOMY   10/05/2015    Supracervical Hysterectomy    MASTECTOMY Right 2022    OTHER SURGICAL HISTORY  06/27/2022    Tongue surgery    OTHER SURGICAL HISTORY  06/27/2022    Neck dissection modified radical    OTHER SURGICAL HISTORY  08/04/2020    Split thickness skin graft    TOTAL ABDOMINAL HYSTERECTOMY W/ BILATERAL SALPINGOOPHORECTOMY  10/05/2015    Salpingo-oophorectomy Bilateral       Social Substance History:   Social History     Tobacco Use    Smoking status: Never    Smokeless tobacco: Never   Substance Use Topics    Alcohol use: Not on file     Social History     Substance and Sexual Activity   Drug Use Not on file       Family History:   Family History   Problem Relation Name Age of Onset    Aortic stenosis Mother Gerda     Hyperlipidemia Mother Gerda     Hypertension Mother Gerda     Stroke Mother Gerda     Arthritis Mother Gerda     Osteoarthritis Mother Gerda     Diabetes Father Chaz     Stomach cancer Father Chaz     Colon cancer Father's Brother      Cancer Other          OBJECTIVE:     Visit Vitals  Temp:  [36.5 °C (97.7 °F)] 36.5 °C (97.7 °F)  Heart Rate:  [67] 67  BP: (114)/(78) 114/78    Pain Scale: 8-9/10 (chronic knee pain - stable, better with steroid injection)      The ECOG performance scale today is Asymptomatic/0            Physical Exam:      Constitutional: Well developed, awake/alert/oriented  x3, no distress, alert and cooperative   Eyes: PERRL, EOMI, clear sclera   ENMT: mucous membranes moist, no apparent injury,  no lesions seen   Head/Neck: Neck supple, no apparent injury, thyroid  without mass or tenderness, No JVD, trachea midline, no bruits   Respiratory/Thorax: Patent airways, CTAB, normal  breath sounds with good chest expansion, thorax symmetric   Cardiovascular: Regular, rate and rhythm, no murmurs,  2+ equal pulses of the extremities, normal S 1and S 2   Gastrointestinal: Nondistended, soft, non-tender,  no rebound tenderness or guarding, no masses palpable, no organomegaly, +BS, no  "bruits   Musculoskeletal: ROM intact, no joint swelling, normal  strength   Extremities: No LE edema   Neurological: alert and oriented x3, intact senses,  motor, response and reflexes, normal strength   Breast: s/p Right mastectomy with implant reconstruction with well healed surgical incision. No palpable mass in the left breast. No palpable axillary or supraclavicular lymphadenopathies bilaterally. No swelling of either upper extremity which had free range of motion.    Lymphatic: No significant lymphadenopathy   Psychological: Appropriate mood and behavior   Skin: Warm and dry, no lesions, no rashes       LAB RESULTS    Lab Results   Component Value Date    WBC 3.1 (L) 06/13/2025    HGB 12.3 06/13/2025    HCT 36.0 06/13/2025     (H) 06/13/2025     06/13/2025     Lab Results   Component Value Date    NEUTROABS 1.84 06/13/2025       Recent Labs     06/13/25  1532   CREATININE 1.30*   BUN 21      K 4.1      CO2 21         No components found for: \"CALCGFR\"  Lab Results   Component Value Date    GLUCOSE 144 (H) 06/13/2025     Lab Results   Component Value Date     06/13/2025    K 4.1 06/13/2025     06/13/2025    CO2 21 06/13/2025    BUN 21 06/13/2025    CREATININE 1.30 (H) 06/13/2025    ALT 18 06/13/2025    AST 20 06/13/2025    ALKPHOS 49 06/13/2025    BILITOT 0.6 06/13/2025     No results found for: \"FOLATE\"  No results found for: \"[id number]\"  Lab Results   Component Value Date    TSH 0.77 02/15/2023        Imaging:      No images are attached to the encounter.        Assessment and Plan:   John Shabazz is a 69 y.o. 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 The patient's breast cancer was diagnosed on December 21, 2021, and was grade 2, estrogen receptor positive at >95%/>95%, progesterone receptor positive at " >95%/>95%%, and HER-2/berta negative.  After neoadjuvant endocrine therapy (letrozole), final path after right mastectomy showed a 5.2 cm mass with 3/8 macrometastatic LNs plus one LN with isolated tumor cell. Size of largest LN was 0.3cm. ([id number])    MammaPrint Index = +0.142; translating to Low Risk Luminal-Type A.     She completed 4 cycles of AC on 1/13/2023 after which chemotherapy was discontinued due to side effects and hospitalization. Patient therefore did not receive any Taxane      Currently on Arimidex and Abemaciclib.   Her abemaciclib was started as 100mg BID, which caused her counts to drop. Her counts are stable at the current dose of 50 mg BID.      Her ANC today is 1.29.   CMP showed stable creatinine 1.26   (baseline 1.15, up to 1.54 on 10/2/2024).   AST/ALT - wnl       Last DEXA was 06/24/2024 - Normal     She has a port - she would like to keep it since she is a hard stick    Note - She has right breast capsulitis (tissue expander in). Can cause 3/10 pain. Reconstructive surgery (BETTY) not planned while she is on abemaciclib.     Her WBC has been low since 12/11/2023 (the oldest data points visible in EPIC). I think this is due to abema (started in July of 2023).      She is to complete abema in July of 2025. Will continue to watch closely. The count is expected to recover after completing the abema therapy. Her ANC has always been good. Low WBC is due to lymphopenia.    Most recent labs (6/3/2025) - Cr 1.3,  Hgb 12.3, , ANC 1.84     Plan  Continue Arimidex 1 mg by mouth daily (To complete 5 years in 6/30/2028. If 10 years, 6/30/2033)    Continue symptom management for joint pain. She is already on third AI. She states she can tolerate. Agreed to do at least 5 years of AI.    Take the last dose of Abemaciclib to 50mg by BID on 6/21 (to complete 2 yrs of abemaciclib)  She is to have knee surgery on 7/2/2025.  Abemaciclib (Verzenio) should be held 3-4 days prior to surgery (half-time of  18.3 hours)  Denosomab scheduled for today (last dose was 3/31/2025): switched from Zometa to denosumab due to rising creatinine  Next denosomab on 9/22/2025  CBC with diff and CMP every 3 months (order placed for Dec today)    Left mammogram in 12/2024, showing BIRAD-3 (probably benign). Same result (Cat 3, benign) today - 6/16/2025.  Should be repeated (in 6 months) - ordered today (to be done in December)  DEXA on 6/24/2024 was normal. She is already receiving bisphosphonate. No need to repeat DEXA from medical oncology perspective.   SOB upon exertion (upon going up stairs) is getting worse. She is going to have a follow up with cardio-oncology in 2 weeks. Next ECHO and cardiology appt are scheduled for 4/17/2025  RTC in 6 months with physical exam and mamm  RTC in December of 2025      Anna Olson MD, PhD   Hematology/Oncology  [facility]

## 2025-06-16 ENCOUNTER — HOSPITAL ENCOUNTER (OUTPATIENT)
Dept: RADIOLOGY | Facility: CLINIC | Age: 70
Discharge: HOME | End: 2025-06-16
Payer: COMMERCIAL

## 2025-06-16 ENCOUNTER — OFFICE VISIT (OUTPATIENT)
Dept: HEMATOLOGY/ONCOLOGY | Facility: CLINIC | Age: 70
End: 2025-06-16
Payer: COMMERCIAL

## 2025-06-16 VITALS
WEIGHT: 194.45 LBS | HEART RATE: 67 BPM | DIASTOLIC BLOOD PRESSURE: 78 MMHG | TEMPERATURE: 97.7 F | SYSTOLIC BLOOD PRESSURE: 114 MMHG | OXYGEN SATURATION: 97 % | BODY MASS INDEX: 31.38 KG/M2

## 2025-06-16 DIAGNOSIS — C50.211 MALIGNANT NEOPLASM OF UPPER-INNER QUADRANT OF RIGHT BREAST IN FEMALE, ESTROGEN RECEPTOR POSITIVE: ICD-10-CM

## 2025-06-16 DIAGNOSIS — Z17.0 MALIGNANT NEOPLASM OF UPPER-INNER QUADRANT OF RIGHT BREAST IN FEMALE, ESTROGEN RECEPTOR POSITIVE: ICD-10-CM

## 2025-06-16 DIAGNOSIS — Z85.3 PERSONAL HISTORY OF BREAST CANCER: ICD-10-CM

## 2025-06-16 PROCEDURE — 77065 DX MAMMO INCL CAD UNI: CPT | Mod: LT

## 2025-06-16 PROCEDURE — 3074F SYST BP LT 130 MM HG: CPT | Performed by: INTERNAL MEDICINE

## 2025-06-16 PROCEDURE — 1126F AMNT PAIN NOTED NONE PRSNT: CPT | Performed by: INTERNAL MEDICINE

## 2025-06-16 PROCEDURE — 99214 OFFICE O/P EST MOD 30 MIN: CPT | Performed by: INTERNAL MEDICINE

## 2025-06-16 PROCEDURE — 3078F DIAST BP <80 MM HG: CPT | Performed by: INTERNAL MEDICINE

## 2025-06-16 PROCEDURE — 1159F MED LIST DOCD IN RCRD: CPT | Performed by: INTERNAL MEDICINE

## 2025-06-16 RX ORDER — ANASTROZOLE 1 MG/1
1 TABLET ORAL DAILY
Qty: 90 TABLET | Refills: 3 | Status: SHIPPED | OUTPATIENT
Start: 2025-06-16

## 2025-06-16 ASSESSMENT — PAIN SCALES - GENERAL: PAINLEVEL_OUTOF10: 0-NO PAIN

## 2025-06-16 NOTE — PATIENT INSTRUCTIONS
Please continue anastrozle.  Stop  taking Verzenio on 6/21/2025.  Please go to next prolia on 9/22/2025.  Please obtain labs before next appointment.  Please schedule follow up and same day mammogram in 6 months.  Please call [phone] (option [phone]) with symptoms, questions or concerns.

## 2025-06-18 ENCOUNTER — EDUCATION (OUTPATIENT)
Dept: ORTHOPEDIC SURGERY | Facility: HOSPITAL | Age: 70
End: 2025-06-18
Payer: COMMERCIAL

## 2025-06-18 ENCOUNTER — APPOINTMENT (OUTPATIENT)
Facility: CLINIC | Age: 70
End: 2025-06-18
Payer: COMMERCIAL

## 2025-06-18 ENCOUNTER — HOSPITAL ENCOUNTER (OUTPATIENT)
Dept: RADIOLOGY | Facility: HOSPITAL | Age: 70
Discharge: HOME | End: 2025-06-18
Payer: COMMERCIAL

## 2025-06-18 ENCOUNTER — PRE-ADMISSION TESTING (OUTPATIENT)
Dept: PREADMISSION TESTING | Facility: HOSPITAL | Age: 70
End: 2025-06-18
Payer: COMMERCIAL

## 2025-06-18 ENCOUNTER — LAB (OUTPATIENT)
Dept: LAB | Facility: HOSPITAL | Age: 70
End: 2025-06-18
Payer: COMMERCIAL

## 2025-06-18 VITALS
HEIGHT: 66 IN | HEART RATE: 78 BPM | OXYGEN SATURATION: 98 % | TEMPERATURE: 97.9 F | SYSTOLIC BLOOD PRESSURE: 128 MMHG | DIASTOLIC BLOOD PRESSURE: 89 MMHG | BODY MASS INDEX: 31.66 KG/M2 | WEIGHT: 197 LBS | RESPIRATION RATE: 16 BRPM

## 2025-06-18 DIAGNOSIS — M25.561 CHRONIC PAIN OF RIGHT KNEE: ICD-10-CM

## 2025-06-18 DIAGNOSIS — M17.11 ARTHRITIS OF RIGHT KNEE: ICD-10-CM

## 2025-06-18 DIAGNOSIS — Z01.818 PREOP TESTING: Primary | ICD-10-CM

## 2025-06-18 DIAGNOSIS — Z01.818 ENCOUNTER FOR OTHER PREPROCEDURAL EXAMINATION: Primary | ICD-10-CM

## 2025-06-18 DIAGNOSIS — R79.9 ABNORMAL BLOOD CHEMISTRY: ICD-10-CM

## 2025-06-18 DIAGNOSIS — G89.29 CHRONIC PAIN OF RIGHT KNEE: ICD-10-CM

## 2025-06-18 LAB
ALBUMIN SERPL BCP-MCNC: 4.4 G/DL (ref 3.4–5)
ALP SERPL-CCNC: 50 U/L (ref 33–136)
ALT SERPL W P-5'-P-CCNC: 17 U/L (ref 7–45)
ANION GAP SERPL CALC-SCNC: 10 MMOL/L (ref 10–20)
AST SERPL W P-5'-P-CCNC: 20 U/L (ref 9–39)
BASOPHILS # BLD AUTO: 0.03 X10*3/UL (ref 0–0.1)
BASOPHILS NFR BLD AUTO: 0.9 %
BILIRUB SERPL-MCNC: 0.4 MG/DL (ref 0–1.2)
BUN SERPL-MCNC: 29 MG/DL (ref 6–23)
CALCIUM SERPL-MCNC: 11 MG/DL (ref 8.6–10.3)
CHLORIDE SERPL-SCNC: 104 MMOL/L (ref 98–107)
CO2 SERPL-SCNC: 27 MMOL/L (ref 21–32)
CREAT SERPL-MCNC: 1.48 MG/DL (ref 0.5–1.05)
EGFRCR SERPLBLD CKD-EPI 2021: 38 ML/MIN/1.73M*2
EOSINOPHIL # BLD AUTO: 0.12 X10*3/UL (ref 0–0.7)
EOSINOPHIL NFR BLD AUTO: 3.5 %
ERYTHROCYTE [DISTWIDTH] IN BLOOD BY AUTOMATED COUNT: 12.8 % (ref 11.5–14.5)
GLUCOSE SERPL-MCNC: 83 MG/DL (ref 74–99)
HCT VFR BLD AUTO: 34.9 % (ref 36–46)
HGB BLD-MCNC: 11.7 G/DL (ref 12–16)
IMM GRANULOCYTES # BLD AUTO: 0.01 X10*3/UL (ref 0–0.7)
IMM GRANULOCYTES NFR BLD AUTO: 0.3 % (ref 0–0.9)
LYMPHOCYTES # BLD AUTO: 0.99 X10*3/UL (ref 1.2–4.8)
LYMPHOCYTES NFR BLD AUTO: 28.8 %
MCH RBC QN AUTO: 33.6 PG (ref 26–34)
MCHC RBC AUTO-ENTMCNC: 33.5 G/DL (ref 32–36)
MCV RBC AUTO: 100 FL (ref 80–100)
MONOCYTES # BLD AUTO: 0.38 X10*3/UL (ref 0.1–1)
MONOCYTES NFR BLD AUTO: 11 %
NEUTROPHILS # BLD AUTO: 1.91 X10*3/UL (ref 1.2–7.7)
NEUTROPHILS NFR BLD AUTO: 55.5 %
NRBC BLD-RTO: ABNORMAL /100{WBCS}
PLATELET # BLD AUTO: 208 X10*3/UL (ref 150–450)
POTASSIUM SERPL-SCNC: 4.8 MMOL/L (ref 3.5–5.3)
PROT SERPL-MCNC: 6.4 G/DL (ref 6.4–8.2)
RBC # BLD AUTO: 3.48 X10*6/UL (ref 4–5.2)
SODIUM SERPL-SCNC: 136 MMOL/L (ref 136–145)
WBC # BLD AUTO: 3.4 X10*3/UL (ref 4.4–11.3)

## 2025-06-18 PROCEDURE — 80053 COMPREHEN METABOLIC PANEL: CPT

## 2025-06-18 PROCEDURE — 99204 OFFICE O/P NEW MOD 45 MIN: CPT

## 2025-06-18 PROCEDURE — 87081 CULTURE SCREEN ONLY: CPT | Mod: BEALAB

## 2025-06-18 PROCEDURE — 73700 CT LOWER EXTREMITY W/O DYE: CPT | Mod: RT

## 2025-06-18 PROCEDURE — 93005 ELECTROCARDIOGRAM TRACING: CPT

## 2025-06-18 PROCEDURE — 36415 COLL VENOUS BLD VENIPUNCTURE: CPT

## 2025-06-18 PROCEDURE — 85025 COMPLETE CBC W/AUTO DIFF WBC: CPT

## 2025-06-18 PROCEDURE — 83036 HEMOGLOBIN GLYCOSYLATED A1C: CPT

## 2025-06-18 PROCEDURE — E0218 FLUID CIRC COLD PAD W PUMP: HCPCS | Performed by: STUDENT IN AN ORGANIZED HEALTH CARE EDUCATION/TRAINING PROGRAM

## 2025-06-18 RX ORDER — IBUPROFEN 600 MG/1
600 TABLET, FILM COATED ORAL 2 TIMES DAILY
COMMUNITY

## 2025-06-18 RX ORDER — CHLORHEXIDINE GLUCONATE 40 MG/ML
SOLUTION TOPICAL DAILY
Qty: 470 ML | Refills: 0 | Status: SHIPPED | OUTPATIENT
Start: 2025-06-18 | End: 2025-06-23

## 2025-06-18 RX ORDER — CHLORHEXIDINE GLUCONATE ORAL RINSE 1.2 MG/ML
15 SOLUTION DENTAL DAILY
Qty: 30 ML | Refills: 0 | Status: SHIPPED | OUTPATIENT
Start: 2025-06-18 | End: 2025-06-20

## 2025-06-18 ASSESSMENT — DUKE ACTIVITY SCORE INDEX (DASI)
CAN YOU DO LIGHT WORK AROUND THE HOUSE LIKE DUSTING OR WASHING DISHES: YES
CAN YOU CLIMB A FLIGHT OF STAIRS OR WALK UP A HILL: YES
CAN YOU TAKE CARE OF YOURSELF (EAT, DRESS, BATHE, OR USE TOILET): YES
CAN YOU DO HEAVY WORK AROUND THE HOUSE LIKE SCRUBBING FLOORS OR LIFTING AND MOVING HEAVY FURNITURE: NO
CAN YOU DO YARD WORK LIKE RAKING LEAVES, WEEDING OR PUSHING A MOWER: NO
CAN YOU PARTICIPATE IN MODERATE RECREATIONAL ACTIVITIES LIKE GOLF, BOWLING, DANCING, DOUBLES TENNIS OR THROWING A BASEBALL OR FOOTBALL: NO
CAN YOU WALK A BLOCK OR TWO ON LEVEL GROUND: YES
CAN YOU DO MODERATE WORK AROUND THE HOUSE LIKE VACUUMING, SWEEPING FLOORS OR CARRYING GROCERIES: YES
TOTAL_SCORE: 24.2
CAN YOU WALK INDOORS, SUCH AS AROUND YOUR HOUSE: YES
CAN YOU RUN A SHORT DISTANCE: NO
CAN YOU PARTICIPATE IN STRENOUS SPORTS LIKE SWIMMING, SINGLES TENNIS, FOOTBALL, BASKETBALL, OR SKIING: NO
DASI METS SCORE: 5.7
CAN YOU HAVE SEXUAL RELATIONS: YES

## 2025-06-18 ASSESSMENT — PAIN - FUNCTIONAL ASSESSMENT: PAIN_FUNCTIONAL_ASSESSMENT: 0-10

## 2025-06-18 ASSESSMENT — PAIN SCALES - GENERAL: PAINLEVEL_OUTOF10: 7

## 2025-06-18 NOTE — PREPROCEDURE INSTRUCTIONS
Medication List            Accurate as of June 18, 2025  2:04 PM. Always use your most recent med list.                anastrozole 1 mg tablet  Commonly known as: Arimidex  Take 1 tablet (1 mg total) by mouth once daily.  Swallow whole with a drink of water.  Medication Adjustments for Surgery: Take/Use as prescribed     calcium phos,dibas-vitamin D3 100 mg calcium- 3 mcg tablet  Additional Medication Adjustments for Surgery: Take last dose 7 days before surgery  Notes to patient: Last dose preoperatively 6/24/2025     * chlorhexidine 4 % external liquid  Commonly known as: Hibiclens  Apply topically once daily for 5 days.  Medication Adjustments for Surgery: Take/Use as prescribed     * chlorhexidine 0.12 % solution  Commonly known as: Peridex  Use 15 mL in the mouth or throat once daily for 2 doses. 15 ML night before surgery and 15 ML morning of surgery. Swish and spit  Medication Adjustments for Surgery: Take/Use as prescribed     escitalopram 20 mg tablet  Commonly known as: Lexapro  Medication Adjustments for Surgery: Take/Use as prescribed     ibuprofen 600 mg tablet  Additional Medication Adjustments for Surgery: Take last dose 7 days before surgery  Notes to patient: Last dose preoperatively 6/24/2025     nitrofurantoin (macrocrystal-monohydrate) 100 mg capsule  Commonly known as: Macrobid  Take 1 capsule (100 mg) by mouth every 12 hours.  Medication Adjustments for Surgery: Take/Use as prescribed     omeprazole 20 mg DR capsule  Commonly known as: PriLOSEC  Medication Adjustments for Surgery: Take/Use as prescribed     rosuvastatin 20 mg tablet  Commonly known as: Crestor  Take 1 tablet (20 mg) by mouth once daily.  Medication Adjustments for Surgery: Take/Use as prescribed     solifenacin 10 mg tablet  Commonly known as: Vesicare  Take 1 tablet (10 mg) by mouth once daily. Swallow tablet whole; do not crush, chew, or split.  Medication Adjustments for Surgery: Do Not take on the morning of  surgery  Notes to patient: Last dose preoperatively 7/1/2025     topiramate 25 mg tablet  Commonly known as: Topamax  Medication Adjustments for Surgery: Take/Use as prescribed     UNABLE TO FIND  Additional Medication Adjustments for Surgery: Take last dose 7 days before surgery  Notes to patient: Last dose preoperatively 6/24/2025     UNABLE TO FIND  Additional Medication Adjustments for Surgery: Take last dose 7 days before surgery  Notes to patient: Last dose preoperatively 6/24/2025     VERZENIO ORAL  Additional Medication Adjustments for Surgery: Other (Comment)  Notes to patient: Follow prescriber preoperative instructions     VOLTAREN ARTHRITIS PAIN TOP  Additional Medication Adjustments for Surgery: Take last dose 7 days before surgery  Notes to patient: Last dose preoperatively 6/24/2025           * This list has 2 medication(s) that are the same as other medications prescribed for you. Read the directions carefully, and ask your doctor or other care provider to review them with you.                NPO Instructions:     Do not eat any food after midnight the night before your surgery/procedure.  You may have clear liquids until TWO hours before surgery/procedure. This includes water, black tea/coffee, (no milk or cream) apple juice and electrolyte drinks (Gatorade).  You may chew gum up to TWO hours before your surgery/procedure.     Additional Instructions:      Seven/Six Days before Surgery:  Review your medication instructions, stop indicated medications  Five Days before Surgery:  Review your medication instructions, stop indicated medications  Begin using your Hibiclens  Three Days before Surgery:  Review your medication instructions, stop indicated medications  The Day before Surgery:  Start using 0.12% CHG mouthwash  No smoking or alcohol use 24 hours before surgery  Review your medication instructions, stop indicated medications  You will be contacted regarding the time of your arrival to facility  and surgery time  Do not eat any food after Midnight  Day of Surgery:  Review your medication instructions, take indicated medications  If you have diabetes, please check your fasting blood sugar upon awakening.  If fasting blood sugar is <80 mg/dl, drink 100 ml of apple juice, time limit of 2 hours before  You may have clear liquids until TWO hours before surgery/procedure.  This includes water, black tea/coffee, (no milk or cream) apple juice and electrolyte drinks (Gatorade)  You may chew gum up to TWO hours before your surgery/procedure  Wear  comfortable loose fitting clothing  Do not use moisturizers, creams, lotions or perfume  All jewelry and valuables should be left at home     CONTACT SURGEON'S OFFICE IF YOU DEVELOP:  * Fever = 100.4 F   * New respiratory symptoms (e.g. cough, shortness of breath, respiratory distress, sore throat)  * Recent loss of taste or smell  *Flu like symptoms such as headache, fatigue or gastrointestinal symptoms  * You develop any open sores, shingles, burning or painful urination   AND/OR:  * You no longer wish to have the surgery.  * Any other personal circumstances change that may lead to the need to cancel or defer this surgery.  *You were admitted to any hospital within one week of your planned procedure.     SMOKING:  *Quitting smoking can make a huge difference to your health and recovery from surgery.    *If you need help with quitting, call 8-561-QUIT-NOW.     THE DAY BEFORE SURGERY:  *Do not eat any food after midnight the night before your surgery.   *You may have up to TEN OUNCES of clear liquids until TWO hours before your instructed ARRIVAL TIME to hospital. This includes water, black tea/coffee, (no milk or cream) apple juice, clear broth and electrolyte drinks (Gatorade). Please avoid clear liquids that are red in color.   *You may chew gum/mints up to TWO hours before your surgery/procedure.     SURGICAL TIME:  *You will be contacted between 2 p.m. and 3 p.m. the  business day before your surgery with your arrival time.  *If you haven't received a call by 3pm, call [phone]  *Scheduled surgery times may change and you will be notified if this occurs-check your personal voicemail for any updates.     ON THE MORNING OF SURGERY:  *Wear comfortable, loose fitting clothing.   *Do not use moisturizers, creams, lotions or perfume.  *All jewelry and valuables should be left at home.  *Prosthetic devices such as contact lenses, hearing aids, dentures, eyelash extensions, hairpins and body piercing must be removed before surgery.     BRING WITH YOU:  *Photo ID and insurance card  *Current list of medications and allergies  *Pacemaker/Defibrillator/Heart stent cards  *CPAP machine and mask  *Slings/splints/crutches  *Copy of your complete Advanced Directive/DHPOA-if applicable  *Neurostimulator implant remote     PARKING AND ARRIVAL:  *Check in at the Main Entrance desk and let them know you are here for surgery.     IF YOU ARE HAVING OUTPATIENT/SAME DAY SURGERY:  *A responsible adult MUST accompany you at the time of discharge and stay with you for 24 hours after your surgery.  *You may NOT drive yourself home after surgery.  *You may use a taxi or ride sharing service ([employer], [employer]) to return home ONLY if you are accompanied by a friend or family member.  *Instructions for resuming your medications will be provided by your surgeon.     Thank you for coming to Pre Admission testing.      If I have prescribed medication please don't forget to  at your pharmacy.      Any questions about today's visit call [phone] and leave a message in the general mailbox.     Patient instructed to ambulate as soon as possible postoperatively to decrease thromboembolic risk.     Angelo Jo, APRN-CNP     Thank you for visiting [facility].  If you have any changes to your health condition, please call the surgeons office to alert them and give them details of  your symptoms.        Preoperative Fasting Guidelines     Why must I stop eating and drinking near surgery time?  With sedation, food or liquid in your stomach can enter your lungs causing serious complications  Increases nausea and vomiting     When do I need to stop eating and drinking before my surgery?  Do not eat any food after midnight the night before your surgery/procedure.  You may have up to TEN ounces of clear liquid until TWO hours before your instructed arrival time to the hospital.  This includes water, black tea/coffee, (no milk or cream) apple juice, and electrolyte drinks (Gatorade)  You may chew gum until TWO hours before your surgery/procedure        Additional Instructions:      The Day before Surgery:  -Review your medication instructions, stop indicated medications  -You will be contacted in the evening regarding the time of your arrival to facility and surgery time     Day of Surgery:  -Review your medication instructions, take indicated medications  -Wear comfortable loose fitting clothing  -Do not use moisturizers, creams, lotions or perfume  -All jewelry and valuables should be left at home                   Preoperative Brain Exercises     What are brain exercises?  A brain exercise is any activity that engages your thinking (cognitive) skills.     What types of activities are considered brain exercises?  Jigsaw puzzles, crossword puzzles, word jumble, memory games, word search, and many more.  Many can be found free online or on your phone via a mobile troy.     Why should I do brain exercises before my surgery?  More recent research has shown brain exercise before surgery can lower the risk of postoperative delirium (confusion) which can be especially important for older adults.  Patients who did brain exercises for 5 to 10 minutes/day in the days before surgery, cut their risk of postoperative delirium in half up to 1 week after surgery.                         [facility]     Preoperative Deep Breathing Exercises     Why it is important to do deep breathing exercises before my surgery?  Deep breathing exercises strengthen your breathing muscles.  This helps you to recover after your surgery and decreases the chance of breathing complications.        How are the deep breathing exercises done?  Sit straight with your back supported.  Breathe in deeply and slowly through your nose. Your lower rib cage should expand and your abdomen may move forward.  Hold that breath for 3 to 5 seconds.  Breathe out through pursed lips, slowly and completely.  Rest and repeat 10 times every hour while awake.  Rest longer if you become dizzy or lightheaded.                      [facility]     Preoperative Deep Breathing Exercises     Why it is important to do deep breathing exercises before my surgery?  Deep breathing exercises strengthen your breathing muscles.  This helps you to recover after your surgery and decreases the chance of breathing complications.        How are the deep breathing exercises done?  Sit straight with your back supported.  Breathe in deeply and slowly through your nose. Your lower rib cage should expand and your abdomen may move forward.  Hold that breath for 3 to 5 seconds.  Breathe out through pursed lips, slowly and completely.  Rest and repeat 10 times every hour while awake.  Rest longer if you become dizzy or lightheaded.        Patient Information: Incentive Spirometer  What is an incentive spirometer?  An incentive spirometer is a device used before and after surgery to “exercise” your lungs.  It helps you to take deeper breaths to expand your lungs.  Below is an example of a basic incentive spirometer.  The device you receive may differ slightly but they all function the same.    Why do I need to use an incentive spirometer?  Using your incentive spirometer prepares your lungs for surgery and helps prevent lung problems  after surgery.  How do I use my incentive spirometer?  When you're using your incentive spirometer, make sure to breathe through your mouth. If you breathe through your nose, the incentive spirometer won't work properly. You can hold your nose if you have trouble.  If you feel dizzy at any time, stop and rest. Try again at a later time.  Follow the steps below:  Set up your incentive spirometer, expand the flexible tubing and connect to the outlet.  Sit upright in a chair or bed. Hold the incentive spirometer at eye level.   Put the mouthpiece in your mouth and close your lips tightly around it. Slowly breathe out (exhale) completely.  Breathe in (inhale) slowly through your mouth as deeply as you can. As you take a breath, you will see the piston rise inside the large column. While the piston rises, the indicator should move upwards. It should stay in between the 2 arrows (see Figure).  Try to get the piston as high as you can, while keeping the indicator between the arrows.   If the indicator doesn't stay between the arrows, you're breathing either too fast or too slow.  When you get it as high as you can, hold your breath for 10 seconds, or as long as possible. While you're holding your breath, the piston will slowly fall to the base of the spirometer.  Once the piston reaches the bottom of the spirometer, breathe out slowly through your mouth. Rest for a few seconds.  Repeat 10 times. Try to get the piston to the same level with each breath.  Repeat every hour while awake  You can carefully clean the outside of the mouthpiece with an alcohol wipe or soap and water.       Patient and Family Education             Ways You Can Help Prevent Blood Clots                    This handout explains some simple things you can do to help prevent blood clots.      Blood clots are blockages that can form in the body's veins. When a blood clot forms in your deep veins, it may be called a deep vein thrombosis, or DVT for short.  Blood clots can happen in any part of the body where blood flows, but they are most common in the arms and legs. If a piece of a blood clot breaks free and travels to the lungs, it is called a pulmonary embolus (PE). A PE can be a very serious problem.         Being in the hospital or having surgery can raise your chances of getting a blood clot because you may not be well enough to move around as much as you normally do.         Ways you can help prevent blood clots in the hospital           Wearing SCDs. SCDs stands for Sequential Compression Devices.   SCDs are special sleeves that wrap around your legs  They attach to a pump that fills them with air to gently squeeze your legs every few minutes.   This helps return the blood in your legs to your heart.   SCDs should only be taken off when walking or bathing.   SCDs may not be comfortable, but they can help save your life.                                            Wearing compression stockings - if your doctor orders them. These special snug fitting stockings gently squeeze your legs to help blood flow.       Walking. Walking helps move the blood in your legs.   If your doctor says it is ok, try walking the halls at least   5 times a day. Ask us to help you get up, so you don't fall.      Taking any blood thinning medicines your doctor orders.        Page 1 of 2            [facility]; 3/23   Ways you can help prevent blood clots at home         Wearing compression stockings - if your doctor orders them. ? Walking - to help move the blood in your legs.       Taking any blood thinning medicines your doctor orders.      Signs of a blood clot or PE        Tell your doctor or nurse know right away if you have of the problems listed below.    If you are at home, seek medical care right away. Call 911 for chest pain or problems breathing.                Signs of a blood clot (DVT) - such as pain,  swelling, redness or warmth in your arm or leg      Signs of  a pulmonary embolism (PE) - such as chest pain or feeling short of breath    Patient Information: Pre-Operative Infection Prevention Measures     Why did I have my nose, under my arms, and groin swabbed?  The purpose of the swab is to identify Staphylococcus aureus inside your nose or on your skin.  The swab was sent to the laboratory for culture.  A positive swab/culture for Staphylococcus aureus is called colonization or carriage.      What is Staphylococcus aureus?  Staphylococcus aureus, also known as “staph”, is a germ found on the skin or in the nose of healthy people.  Sometimes Staphylococcus aureus can get into the body and cause an infection.  This can be minor (such as pimples, boils, or other skin problems).  It might also be serious (such as a blood infection, pneumonia, or a surgical site infection).    What is Staphylococcus aureus colonization or carriage?  Colonization or carriage means that a person has the germ but is not sick from it.  These bacteria can be spread on the hands or when breathing or sneezing.    How is Staphylococcus aureus spread?  It is most often spread by close contact with a person or item that carries it.    What happens if my culture is positive for Staphylococcus aureus?  Your doctor/medical team will use this information to guide any antibiotic treatment which may be necessary.  Regardless of the culture results, we will clean the inside of your nose with a betadine swab just before you have your surgery.      Will I get an infection if I have Staphylococcus aureus in my nose or on my skin?  Anyone can get an infection with Staphylococcus aureus.  However, the best way to reduce your risk of infection is to follow the instructions provided to you for the use of your CHG soap and dental rinse.        Patient Information: Oral/Dental Rinse    What is oral/dental rinse?   It is a mouthwash. It is a way of cleaning the mouth with a germ-killing solution before your surgery.   The solution contains chlorhexidine, commonly known as CHG.   It is used inside the mouth to kill a bacteria known as Staphylococcus aureus.  Let your doctor know if you are allergic to Chlorhexidine.    We have sent a prescription for CHG 0.12% mouthwash to your preferred pharmacy.  If you have not already, Please  your prescription and start using the day before before surgery.  Follow the instruction sheet provided to you at your CPM/PAT appointment. Please contact [facility] if you do not receive your CHG mouthwash prescription.     Why do I need to use CHG oral/dental rinse?  The CHG oral/dental rinse helps to kill a bacteria in your mouth known as Staphylococcus aureus.     This reduces the risk of infection at the surgical site.      Using your CHG oral/dental rinse  STEPS:  Use your CHG oral/dental rinse after you brush your teeth the night before (at bedtime) and the morning of your surgery.  Follow all directions on your prescription label.    Use the cap on the container to measure 15ml   Swish (gargle if you can) the mouthwash in your mouth for at least 30 seconds, (do not swallow) and spit out  After you use your CHG rinse, do not rinse your mouth with water, drink or eat.  Please refer to the prescription label for the appropriate time to resume oral intake      What side effects might I have using the CHG oral/dental rinse?  CHG rinse will stick to plaque on the teeth.  Brush and floss just before use.  Teeth brushing will help avoid staining of plaque during use.      Patient Information: Home Preoperative Antibacterial Shower      What is a home preoperative antibacterial shower?  This shower is a way of cleaning the skin with a germ-killing solution before surgery.  The solution contains chlorhexidine, commonly known as CHG.  CHG is a skin cleanser with germ-killing ability.  Let your doctor know if you are allergic to chlorhexidine.    Why do I need to take a preoperative antibacterial  shower?  Skin is not sterile.  It is best to try to make your skin as free of germs as possible before surgery.  Proper cleansing with a germ-killing soap before surgery can lower the number of germs on your skin.  This helps to reduce the risk of infection at the surgical site.  Following the instructions listed below will help you prepare your skin for surgery.      How do I use the solution?  Steps:  Begin using your CHG soap 5 days before your scheduled surgery on 6/27/2025.    First, wash and rinse your hair using the CHG soap. Keep CHG soap away from ear canals and eyes.  Rinse completely, do not condition.  Hair extensions should be removed.  Wash your face with your normal soap and rinse.    Apply the CHG solution to a clean wet washcloth.  Turn the water off or move away from the water spray to avoid premature rinsing of the CHG soap as you are applying.   Firmly lather your entire body from the neck down.  Do not use on your face.  Pay special attention to the area(s) where your incision(s) will be located unless they are on your face.  Avoid scrubbing your skin too hard.  The important point is to have the CHG soap sit on your skin for 3 minutes.    When the 3 minutes are up, turn on the water and rinse the CHG solution off your body completely.   DO NOT wash with regular soap after you have used the CHG soap solution  Pat yourself dry with a clean, freshly-laundered towel.  DO NOT apply powders, deodorants, or lotions.  Dress in clean, freshly laundered nightclothes.    Be sure to sleep with clean, freshly laundered sheets.  Be aware that CHG will cause stains on fabrics; if you wash them with bleach after use.  Rinse your washcloth and other linens that have contact with CHG completely.  Use only non-chlorine detergents to launder the items used.   The morning of surgery is the fifth day.  Repeat the above steps and dress in clean comfortable clothing     Whom should I contact if I have any questions  regarding the use of CHG soap?  Call the [facility] at [phone] if you have any questions.      Preoperative Clearances  -If you are informed by the preadmission testing team that you need clearance for your surgery, please reach out to the provider in question to assisting accommodating obtaining your clearance.   -Please have you provider fax your clearance letter to [phone] if needed.   -A blank clearance letter will be provided to you if indicated.     Anticoagulation  -If you are on oral anticoagulation such as Coumadin, Eliquis, Xarelto, Plavix, Brilinta, Pradaxa; we will need to obtain preoperative anticoagulation instructions from the prescribing provider.   -We will reach out to the prescriber but do encourage you to call their office as well as it will increase the chances of getting the necessary information.   -We will contact you with the instruction once we obtain them.

## 2025-06-18 NOTE — CPM/PAT H&P
CPM/PAT Evaluation       Name: Radha Shabazz (Radha Shabazz)  /Age: 1955/70 y.o.     In-Person       Chief Complaint: Arthritis of right knee     HPI  Patient is an alert and oriented 70 year old female scheduled for a right total knee arthroplasty/resurfacing on 2025 with Dr. Nevarez for arthritis of right knee. She endorses right knee pain that she currently rates at a 4/10 which worsens during ambulation and activity. She is ambulatory without assistive devices with a current METS score of 5.7. PMHX includes OA, obesity, anxiety, depression, breast cancer, squamous cell of tongue, GERD,and HLD. Presents to [facility] PAT today for perioperative risk stratification and optimization.     Medical History[1]    Surgical History[2]    Patient  has no history on file for sexual activity.    Family History[3]    Allergies[4]    Prior to Admission medications    Medication Sig Start Date End Date Taking? Authorizing Provider   abemaciclib (VERZENIO ORAL) Take 1 tablet by mouth 2 times a day.   Yes Historical Provider, MD   anastrozole (Arimidex) 1 mg tablet Take 1 tablet (1 mg total) by mouth once daily.  Swallow whole with a drink of water. 25  Yes Anna Olson MD   calcium phosdibas-vitamin D3 100 mg calcium- 3 mcg tablet Take by mouth.   Yes Historical Provider, MD   diclofenac sodium (VOLTAREN ARTHRITIS PAIN TOP) Apply topically.   Yes Historical Provider, MD   escitalopram (Lexapro) 20 mg tablet Take 0.5 tablets (10 mg) by mouth once daily.   Yes Historical Provider, MD   ibuprofen 600 mg tablet Take 1 tablet (600 mg) by mouth 2 times a day.   Yes Historical Provider, MD   omeprazole (PriLOSEC) 20 mg DR capsule Take 1 capsule (20 mg) by mouth once daily.   Yes Historical Provider, MD   rosuvastatin (Crestor) 20 mg tablet Take 1 tablet (20 mg) by mouth once daily. 25 Yes JUAN Flores-CNP   solifenacin (Vesicare) 10 mg tablet Take 1 tablet (10 mg) by mouth once daily. Swallow tablet  whole; do not crush, chew, or split. 5/21/25 5/21/26 Yes WALTER Ruiz   topiramate (Topamax) 25 mg tablet Take 1 tablet (25 mg) by mouth once daily as needed. 1/23/18  Yes Historical Provider, MD   nitrofurantoin, macrocrystal-monohydrate, (Macrobid) 100 mg capsule Take 1 capsule (100 mg) by mouth every 12 hours. 5/21/25   WALTER Ruiz   UNABLE TO FIND Breast prosthesis for left and/or right breast 10/24/22   Historical Provider, MD   UNABLE TO FIND Mastectomy Bra for Right Mastectomy 10/24/22   Historical Provider, MD   anastrozole (Arimidex) 1 mg tablet Take 1 tablet (1 mg total) by mouth once daily.  Swallow whole with a drink of water. 1/12/24 6/16/25  WALTER Rubin   anastrozole (Arimidex) 1 mg tablet Take 1 tablet (1 mg total) by mouth once daily.  Swallow whole with a drink of water. 1/20/25 6/16/25  Anna Olson MD   sodium hyaluronate (Durolane) 60 mg/3 mL injection Inject one syringe(60mg/3mL) into right knee one time at doctors office 8/2/24 6/18/25  Venita Yañez MD       Review of Systems   Constitutional: Negative for chills, decreased appetite, diaphoresis, fever and malaise/fatigue.   Eyes:  Negative for blurred vision and double vision.   Cardiovascular:  Negative for chest pain, claudication, cyanosis, irregular heartbeat, leg swelling, near-syncope and palpitations. Positive for baseline dyspnea on exertion.  Respiratory:  Negative for cough, hemoptysis, shortness of breath and wheezing.    Endocrine: Negative for cold intolerance, heat intolerance, polydipsia, polyphagia and polyuria.   Gastrointestinal:  Negative for abdominal pain, constipation, diarrhea, dysphagia, nausea and vomiting.   Genitourinary:  Negative for bladder incontinence, dysuria, hematuria, incomplete emptying, nocturia, frequency, pelvic pain and urgency.   Neurological:  Negative for headaches, light-headedness, paresthesias, sensory change and weakness.  "  Psychiatric/Behavioral:  Negative for altered mental status.    Musculoskeletal: Negative for myalgias. Positive for arthralgias     Vitals and nursing note reviewed.     Physical exam  Constitutional:       Appearance: Normal appearance. She is Obese.   HENT:      Head: Normocephalic and atraumatic.      Mouth/Throat:      Mouth: Mucous membranes are moist.      Pharynx: Oropharynx is clear.      Tongue graft present on exam with asymmetrical tongue base.   Eyes:      Extraocular Movements: Extraocular movements intact.      Conjunctiva/sclera: Conjunctivae normal.      Pupils: Pupils are equal, round, and reactive to light.   Cardiovascular:      PMI at left midclavicular line. Normal rate. Regular rhythm. Normal S1. Normal S2.       Murmurs: There is no murmur.      No gallop.  No click. No rub.       No audible carotid bruit     No lower extremity edema on exam  Pulmonary:      Effort: Pulmonary effort is normal.      Breath sounds: Normal breath sounds.   Abdominal:      General: Abdomen is flat. Bowel sounds are normal.      Palpations: Abdomen is soft and non-tender  Musculoskeletal:      Cervical back: Normal range of motion and neck supple.      Knee, right: Limited ROM  Skin:     General: Skin is warm and dry.      Capillary Refill: Capillary refill takes less than 2 seconds.   Neurological:      General: No focal deficit present.      Mental Status: She is alert and oriented to person, place, and time. Mental status is at baseline.   Psychiatric:         Mood and Affect: Mood normal.         Behavior: Behavior normal.         Thought Content: Thought content normal.         Judgment: Judgment normal.     Vitals and nursing note reviewed. Physical exam within normal limits.     Visit Vitals  /89   Pulse 78   Temp 36.6 °C (97.9 °F)   Resp 16   Ht 1.676 m (5' 6\")   Wt 89.4 kg (197 lb)   SpO2 98%   BMI 31.80 kg/m²   OB Status Postmenopausal   Smoking Status Never   BSA 2.04 m²     DASI Risk Score  "     Flowsheet Row Pre-Admission Testing from 6/18/2025 in [facility] Questionnaire Series Submission from 6/3/2025 in [facility] Care with Generic Provider Malathi   Can you take care of yourself (eat, dress, bathe, or use toilet)?  2.75 filed at 06/18/2025 1432 2.75  filed at 06/03/2025 2107   Can you walk indoors, such as around your house? 1.75 filed at 06/18/2025 1432 1.75  filed at 06/03/2025 2107   Can you walk a block or two on level ground?  2.75 filed at 06/18/2025 1432 0  filed at 06/03/2025 2107   Can you climb a flight of stairs or walk up a hill? 5.5 filed at 06/18/2025 1432 5.5  filed at 06/03/2025 2107   Can you run a short distance? 0 filed at 06/18/2025 1432 --   Can you do light work around the house like dusting or washing dishes? 2.7 filed at 06/18/2025 1432 2.7  filed at 06/03/2025 2107   Can you do moderate work around the house like vacuuming, sweeping floors or carrying groceries? 3.5 filed at 06/18/2025 1432 3.5  filed at 06/03/2025 2107   Can you do heavy work around the house like scrubbing floors or lifting and moving heavy furniture?  0 filed at 06/18/2025 1432 0  filed at 06/03/2025 2107   Can you do yard work like raking leaves, weeding or pushing a mower? 0 filed at 06/18/2025 1432 0  filed at 06/03/2025 2107   Can you have sexual relations? 5.25 filed at 06/18/2025 1432 5.25  filed at 06/03/2025 2107   Can you participate in moderate recreational activities like golf, bowling, dancing, doubles tennis or throwing a baseball or football? 0 filed at 06/18/2025 1432 0  filed at 06/03/2025 2107   Can you participate in strenous sports like swimming, singles tennis, football, basketball, or skiing? 0 filed at 06/18/2025 1432 0  filed at 06/03/2025 2107   DASI SCORE 24.2 filed at 06/18/2025 1432 --   METS Score (Will be calculated only when all the questions are answered) 5.7 filed at 06/18/2025 1432 --          Caprini DVT Assessment      Flowsheet Row Pre-Admission Testing  from 6/18/2025 in [facility] ED to Hosp-Admission (Discharged) from 5/10/2025 in [facility] 6 Surgical Oncology with Yandy Garvey MD and Ramona Person, DO   DVT Score (IF A SCORE IS NOT CALCULATING, MUST SELECT A BMI TO COMPLETE) 12 filed at 06/18/2025 1432 8 filed at 05/10/2025 1822   Medical Factors Present cancer, chemotherapy, or previous malignancy filed at 06/18/2025 1432 [facility], Present cancer, chemotherapy, or previous malignancy filed at 05/10/2025 1822   Surgical Factors Elective major lower extremity arthroplasty filed at 06/18/2025 1432 --   BMI (BMI MUST BE CHOSEN) 31-40 (Obesity) filed at 06/18/2025 1432 31-40 (Obesity) filed at 05/10/2025 1822          Modified Frailty Index      Flowsheet Row Pre-Admission Testing from 6/18/2025 in [facility]   Non-independent functional status (problems with dressing, bathing, personal grooming, or cooking) 0 filed at 06/18/2025 1433   History of diabetes mellitus  0 filed at 06/18/2025 1433   History of COPD 0 filed at 06/18/2025 1433   History of CHF No filed at 06/18/2025 1433   History of MI 0 filed at 06/18/2025 1433   History of Percutaneous Coronary Intervention, Cardiac Surgery, or Angina No filed at 06/18/2025 1433   Hypertension requiring the use of medication  0.0909 filed at 06/18/2025 1433   Peripheral vascular disease 0 filed at 06/18/2025 1433   Impaired sensorium (cognitive impairement or loss, clouding, or delirium) 0 filed at 06/18/2025 1433   TIA or CVA withouy residual deficit 0 filed at 06/18/2025 1433   Cerebrovascular accident with deficit 0 filed at 06/18/2025 1433   Modified Frailty Index Calculator .0909 filed at 06/18/2025 1433          [id number] Stroke Risk Points  Current as of 6 minutes ago        N/A 0 to 9 Points:      Last Change: N/A          The [id number] risk score (Lip CECILIA, et al. 2009. © 2010 [employer]) quantifies the risk  of stroke for a patient with atrial fibrillation. For patients without atrial fibrillation or under the age of 18 this score appears as N/A. Higher score values generally indicate higher risk of stroke.        This score is not applicable to this patient. Components are not calculated.          Revised Cardiac Risk Index      Flowsheet Row Pre-Admission Testing from 6/18/2025 in [facility]   High-Risk Surgery (Intraperitoneal, Intrathoracic,Suprainguinal vascular) 0 filed at 06/18/2025 1433   History of ischemic heart disease (History of MI, History of positive exercuse test, Current chest paint considered due to myocardial ischemia, Use of nitrate therapy, ECG with pathological Q Waves) 0 filed at 06/18/2025 1433   History of congestive heart failure (pulmonary edemia, bilateral rales or S3 gallop, Paroxysmal nocturnal dyspnea, CXR showing pulmonary vascular redistribution) 0 filed at 06/18/2025 1433   History of cerebrovascular disease (Prior TIA or stroke) 0 filed at 06/18/2025 1433   Pre-operative insulin treatment 0 filed at 06/18/2025 1433   Pre-operative creatinine>2 mg/dl 0 filed at 06/18/2025 1433   Revised Cardiac Risk Calculator 0 filed at 06/18/2025 1433          Apfel Simplified Score    No data to display       Risk Analysis Index Results This Encounter    No data found in the last 10 encounters.       Stop Bang Score      Flowsheet Row Pre-Admission Testing from 6/18/2025 in [facility] Questionnaire Series Submission from 6/3/2025 in [facility] with Generic Provider Malathi   Do you snore loudly? 0 filed at 06/18/2025 1347 0  filed at 06/03/2025 2107   Do you often feel tired or fatigued after your sleep? 1 filed at 06/18/2025 1347 0  filed at 06/03/2025 2107   Has anyone ever observed you stop breathing in your sleep? 0 filed at 06/18/2025 1347 0  filed at 06/03/2025 2107   Do you have or are you being treated for high blood pressure? 1 filed at 06/18/2025 1347 0   filed at 06/03/2025 2107   Recent BMI (Calculated) 31.8 filed at 06/18/2025 1347 31.2  filed at 06/03/2025 2107   Is BMI greater than 35 kg/m2? 0=No filed at 06/18/2025 1347 0=No  filed at 06/03/2025 2107   Age older than 50 years old? 1=Yes filed at 06/18/2025 1347 1=Yes  filed at 06/03/2025 2107   Is your neck circumference greater than 17 inches (Male) or 16 inches (Female)? 0 filed at 06/18/2025 1347 --   Gender - Male 0=No filed at 06/18/2025 1347 0=No  filed at 06/03/2025 2107   STOP-BANG Total Score 3 filed at 06/18/2025 1347 --          Prodigy: High Risk  Total Score: 8              Prodigy Age Score           ARISCAT Score for Postoperative Pulmonary Complications      Flowsheet Row Pre-Admission Testing from 6/18/2025 in [facility]   Age Calculated Score 3 filed at 06/18/2025 1433   Preoperative SpO2 0 filed at 06/18/2025 1433   Respiratory infection in the last month Either upper or lower (i.e., URI, bronchitis, pneumonia), with fever and antibiotic treatment 0 filed at 06/18/2025 1433   Preoperative anemia (Hgb less than 10 g/dl) 0 filed at 06/18/2025 1433   Surgical incision  0 filed at 06/18/2025 1433   Duration of surgery  16 filed at 06/18/2025 1433   Emergency Procedure  0 filed at 06/18/2025 1433   ARISCAT Total Score  19 filed at 06/18/2025 1433          Ignacio Perioperative Risk for Myocardial Infarction or Cardiac Arrest (MAURILIO)      Flowsheet Row Pre-Admission Testing from 6/18/2025 in [facility]   Calculated Age Score 1.38 filed at 06/18/2025 1433   Functional Status  0 filed at 06/18/2025 1433   ASA Class  -3.29 filed at 06/18/2025 1433   Creatinine 0 filed at 06/18/2025 1433   Type of Procedure  0.80 filed at 06/18/2025 1433   MAURILIO Total Score  -6.36 filed at 06/18/2025 [facility]   MAURILIO % 0.17 filed at 06/18/2025 1433          Assessment & Plan:    Neuro:  No diagnosis or significant findings on chart review or clinical presentation and evaluation.      HEENT/Airway:  No diagnosis or significant findings on chart review or clinical presentation and evaluation.   STOP-BANG Score-3 points moderate risk for ESPINOZA    Mallampati::  IV    TM distance::  >3 FB    Neck ROM::  Full  Dentures-denies  Crowns-reports x 4-5 crowns  Implants-denies    Cardiovascular:  No significant findings on chart review or clinical presentation and evaluation.   History of Hyperlipidemia-Managed with Rosuvastatin  METS: 5.7  RCRI: 0 points, 3.9%  risk for postoperative MACE   MAURILIO: 0.17% risk for postoperative MACE  EKG -Completed 6/18/2025  Normal sinus rhythm  Normal EKG          Pulmonary:  No diagnosis or significant findings on chart review or clinical presentation and evaluation.   ARISCAT: <26 points, 1.6% risk of in-hospital postoperative pulmonary complication  PRODIGY: Moderate risk for opioid induced respiratory depression  Smoking History-She has never smoked.  Discussed smoking cessation and deep breathing handout given    Renal/Urinary:  No diagnosis or significant findings on chart review or clinical presentation and evaluation, however, the patient is at increased risk of perioperative renal complications secondary to age>/= 56 and HTN. Preventative measures include BP monitoring, medication compliance, and hydration management.   History of Chronic kidney disease-Will check renal function in PAT  CMP-Reviewed, stable  Creatinine-1.48  GFR-38    Endocrine:  No diagnosis or significant findings on chart review or clinical presentation and evaluation.   NCT5S-6.2%    Hematologic/Immunology:  No significant findings on chart review or clinical presentation and evaluation.  History of Breast cancer-Diagnosed in 2023. Treated with right sided mastectomy and chemoradiation. Patient is currently taking Anastrozole and Verzenio(last dose is on 6/21/2025).   History of Tongue cancer-Squamous cell. Diagnosed in 2018. esophagoscopy, tracheostomy, left selective neck dissection, left  glossectomy, left forearm FF to the left glossectomy defect, FTSG, VAC, splint on 6/29/18.  Overall, she has recovered very well from surgery. She admits to improved ankyloglossia and speech.   The patient is not a Jehovah’s witness and will accept blood and blood products if medically indicated.   History of previous blood transfusions Yes - No reported transfusion reactions  CBC-Pending  HGB-Pending  Caprini Score 12, patient at High for postoperative DVT. Pt supplied education/VTE handout  Anticoagulation use: No     Gastrointestinal:   No significant findings on chart review or clinical presentation and evaluation.   History of Gastroesophageal reflux disease-Managed with Omeprazole  Recreational drug use: alcohol  Alcohol use social drinker    Infectious disease:   No diagnosis or significant findings on chart review or clinical presentation and evaluation.   Prescription provided for CHG body wash and dental rinse. CHG use instructions reviewed and provided to patient.  Staph screen collected-Negative    Musculoskeletal:   No diagnosis on chart review or clinical presentation and evaluation.   Positive for Obesity-BMI 31.80  JHFRAT score-6 points. moderate risk for falls    Anesthesia:  ASA 2 - Patient with mild systemic disease with no functional limitations  Anticipated anesthesia-spinal  History of General anesthesia- yes  Complications- PONV and difficult airway. Patient had tongue/neck reconstruction surgery. Mallampati IV with asymmetrical tongue base and a history of difficult airway. Dr Melara in to assess patients airway in PAT. Ok to proceed at this time.   No family history of anesthesia complications    Labs & Imaging ordered:  CBC, CMP, HBA1C, MRSA, EKG  Nickel/metal allergy-negative  Shellfish allergy-negative Does report iodine allergy    Overall, patient Moderate Risk for the scheduled Moderate Risk surgery. Discussed with patient medication instructions, NPO guidelines, and any questions or  concerns.     Face to face time spent 45 minutes  All resulted testing from PAT was forwarded to the surgeon and the patient's PCP if applicable.          [1]   Past Medical History:  Diagnosis Date    Arthritis 5 years ago    BPH (benign prostatic hyperplasia) 10 years ago    I have since brought my BP down. No need for meds    Breast cancer     Depression 20 years ago    Encounter for gynecological examination (general) (routine) without abnormal findings     Encounter for gynecological examination without abnormal finding    Encounter for screening mammogram for malignant neoplasm of breast     Visit for screening mammogram    GERD (gastroesophageal reflux disease) 20 years ago    Head and neck cancer (Multi) April 2020    History of transfusion     Hyperlipidemia April 2025    Hypertension 15 years ago    Irritable bowel syndrome When i was 19 years old    Kidney stone     Obesity     I have been over weight  for several years    Personal history of irradiation     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    Personal history of other diseases of the circulatory system     History of hypertension    Urinary tract infection 40 years ago   [2]   Past Surgical History:  Procedure Laterality Date    AMPUTATION      APPENDECTOMY  10/05/2015    Appendectomy    BREAST BIOPSY      HYSTERECTOMY  10/05/2015    Supracervical Hysterectomy    MASTECTOMY Right 2022    OTHER SURGICAL HISTORY  06/27/2022    Tongue surgery    OTHER SURGICAL HISTORY  06/27/2022    Neck dissection modified radical    OTHER SURGICAL HISTORY  08/04/2020    Split thickness skin graft    TOTAL ABDOMINAL HYSTERECTOMY W/ BILATERAL SALPINGOOPHORECTOMY  10/05/2015    Salpingo-oophorectomy Bilateral   [3]   Family History  Problem Relation Name Age of Onset    Aortic stenosis Mother Gerda     Hyperlipidemia Mother Gerda     Hypertension Mother Gerda     Stroke Mother Gerda     Arthritis Mother Gerda      Osteoarthritis Mother Gerda     Diabetes Father Chaz     Stomach cancer Father Chaz     Colon cancer Father's Brother      Cancer Other     [4]   Allergies  Allergen Reactions    Iodine Hives    Iodinated Contrast Media Rash, Swelling and Unknown    Sulfa (Sulfonamide Antibiotics) Rash, Swelling, Unknown and Hives

## 2025-06-18 NOTE — GROUP NOTE
In addition to the group class activities, Radha Shabazz had the following lab work completed:  No orders of the defined types were placed in this encounter.      A new History and Physical was not completed.    This class lasted approximately 2 hours and had 6 participants. The nurse instructor covered the following topics:  MyChart Enrollment  Communication with Care Team  My Chart is the best form of communication to reach all of your caregivers  You can send messages to specific care givers, or a care team  Continued Education  You will be enrolled in a Joint Replacement care plan to receive additional education before and after surgery  You can review a short recording of the class content - [email or web address]  Access to Medical Records  You can access test results, office notes, appointments, etc.  You can connect to other healthcare systems who use [employer] ([facility], [facility], [facility], etc.)  [employer]  Program Information  Opportunity to Opt Out    Background/Understanding of Joint Replacement Surgery  Potential for same day discharge  Any questions or concerns to be directed to the surgeon's office  Not all patients are appropriate for same day discharge  All patients will be required to meet discharge criteria prior to leaving our care    How to Prepare for Surgery  Use of Recreational Products (Nicotine, Alcohol, THC, CBD, Drugs, Etc.)  The ultimate goal is to quit using thee products!  Stop several weeks before surgery  Such products slow down the healing process and increase risk of post-op infection and complications  Clearance for Surgery  Preadmission Testing - Appropriateness for anesthesia  Medical Clearance by Specialists  Dental Clearance  Cracked/Broken/Loose teeth left untreated may postpone surgery  The importance of post-op antibiotics for dental visits per surgeon protocol  Preadmission Testing  **Potential for postponed surgery if appropriate clearance is not  obtained  Medication Instruction  Follow instructions provided by the doctor who prescribes your medication (typically, but not limited to cardiologist)  Preadmission testing will provide additional instructions during your appointment on what to stop and what to take as you get closer to surgery  For clarification of these instructions, please call preadmission testing directly - [phone]  Tips for Preparing the home for discharge from the hospital  Care Partner  Requirement for surgery, the patient must have a plan to have help at home  Potential for postponed surgery if plan for home support cannot be established  Your Care Partner does not need medical training  How the care partner can help after surgery  CHG Body Wash/Mouth Wash  Follow the instructions given at preadmission testing  Body wash is to be used on the body and hair for 5 washes  Mouthwash is to be used the night before and morning of surgery  **This is a system-wide protocol developed by infectious disease professionals, we will not alter our recommendations for those with sensitive skin or those who have special hair needs.  Please follow the instructions as they are written as this will provide the best infection prevention measures for surgery.  Should you have an allergy to one of the products, please discuss with your preadmission team**    What to Expect in the Hospital/At Home  Morning of Surgery NPO Guidelines  Nothing to eat after midnight  Water can be consumed up to 2 hours prior to arrival  Surgical and Post-Surgical Care Team  Surgical Team  Anesthesia Team  Nursing  Physical Therapy  Care Coordinating  Pharmacy  Hospital Arrival Instructions  Arrive at the time provided to you  Consider traffic patterns (rush-hour) based on arrival time  Have arrangements made for a ride home  If discharging same day, care partner should remain at the hospital  Recovering after Surgery  Recovery Room - Visitors are not brought back  Transition to  hospital room - 2nd Floor, Visitors will be directed to your room  The presence of and strategies for controlling surgical pain and swelling  The importance of early mobility  Side effects after surgery  What to expect if staying overnight    Discharge Planning  The intended plan for discharge will be for patients to discharge home  All patients require a care partner (family, friend, neighbor, etc.) to stay with the patient for the first few nights after surgery  The inability to secure help at home may postpone surgery  Home Care Services set up per surgeon order  Physical Therapy  Occupational Therapy  **If desired, private duty care can be arranged by the patient ahead of time**  Outpatient Physical Therapy per surgeon order    Recovering at Home  Wound Care  Follow wound care instructions found in your discharge paperwork  Bandage is water-resistant and you may shower with the bandage  Do not scrub directly over the bandage  Do not submerge in water until cleared (bathtub, hot tub, pool, etc.)    Post-Op Risk Prevention  Infection Prevention  Promptly seek treatment for any infections post-operatively  Routine dental visits must be postponed for 3 months after surgery  Your surgeon may require antibiotics prior to future dental visits  Any concerns for infection not related directly to the knee or the hip should be managed by your primary care provider  Blood Clots  Be sure to complete the course of blood thinning medication as prescribed by your surgeon  Movement every 1-2 hours during the day is encouraged to prevent blood clots  Monitor for signs of blood clots  Wear compression stockings as prescribed by your surgeon  Constipation  Constipation is common following surgery  Drink plenty of fluids  Take stool softener/laxative as prescribed by your surgeon  Move around frequently  Eat foods high in fiber  Fall Prevention  Prepare home ahead of time to clear space to move with walker  Remove throw rugs and  electrical cords from walkways  Install railings near any stairways with more than 2 steps  Use night lights for increased visibility at night  Continue to use your assistive device until cleared by surgeon or physical therapy  Dislocation Prevention - Not all procedures will have dislocation precautions  Follow dislocation precautions provided by your surgeon  It is OK to resume sexual activity about 6 weeks following surgery  Be sure to follow any dislocation precautions assigned    Durable Medical Equipment  Cold Therapy  Breg Cold Therapy Machines  Ice/Gel Packs  Assistive Devices  Folding Walker with Wheels (in the front only)  No Rollators  Crutches if approved by Physical Therapy and Surgeon after surgery  Hip Kits  Raised Toilet Seats  Additional Compression Stockings    Joint Preservation  Healthy Activities when Cleared  Walking  Swimming  Bike Riding  Activities to Avoid  Refrain from repetitive motions which have a high impact on the joint  Gradual Progression  Progress activity slowly, listen to your body  Common Findings - NORMAL after surgery  Clicking/Grinding  Numbness near incision    Physical Therapy  Prehabilitation exercises  START TODAY ON BOTH LEGS  Surgery Specific Precautions  Follow surgery specific precautions found in your discharge paperwork    Follow-Up Visit  All patients will see their surgeon for a follow up visit after surgery  The visit may range from 2-6 weeks after surgery and is surgeon specific      Please don't hesitate to reach out if you have any additional questions or concerns.    Thank you,  TRUDY Flores, RN  Hawa Johnson RN  [facility]  [facility]   [phone]

## 2025-06-18 NOTE — H&P (VIEW-ONLY)
CPM/PAT Evaluation       Name: Radha Shabazz (Radha Shabazz)  /Age: 1955/70 y.o.     In-Person       Chief Complaint: Arthritis of right knee     HPI  Patient is an alert and oriented 70 year old female scheduled for a right total knee arthroplasty/resurfacing on 2025 with Dr. Nevarez for arthritis of right knee. She endorses right knee pain that she currently rates at a 4/10 which worsens during ambulation and activity. She is ambulatory without assistive devices with a current METS score of 5.7. PMHX includes OA, obesity, anxiety, depression, breast cancer, squamous cell of tongue, GERD,and HLD. Presents to [facility] PAT today for perioperative risk stratification and optimization.     Medical History[1]    Surgical History[2]    Patient  has no history on file for sexual activity.    Family History[3]    Allergies[4]    Prior to Admission medications    Medication Sig Start Date End Date Taking? Authorizing Provider   abemaciclib (VERZENIO ORAL) Take 1 tablet by mouth 2 times a day.   Yes Historical Provider, MD   anastrozole (Arimidex) 1 mg tablet Take 1 tablet (1 mg total) by mouth once daily.  Swallow whole with a drink of water. 25  Yes Anna Olson MD   calcium phosdibas-vitamin D3 100 mg calcium- 3 mcg tablet Take by mouth.   Yes Historical Provider, MD   diclofenac sodium (VOLTAREN ARTHRITIS PAIN TOP) Apply topically.   Yes Historical Provider, MD   escitalopram (Lexapro) 20 mg tablet Take 0.5 tablets (10 mg) by mouth once daily.   Yes Historical Provider, MD   ibuprofen 600 mg tablet Take 1 tablet (600 mg) by mouth 2 times a day.   Yes Historical Provider, MD   omeprazole (PriLOSEC) 20 mg DR capsule Take 1 capsule (20 mg) by mouth once daily.   Yes Historical Provider, MD   rosuvastatin (Crestor) 20 mg tablet Take 1 tablet (20 mg) by mouth once daily. 25 Yes JUAN Flores-CNP   solifenacin (Vesicare) 10 mg tablet Take 1 tablet (10 mg) by mouth once daily. Swallow tablet  whole; do not crush, chew, or split. 5/21/25 5/21/26 Yes WALTER Ruiz   topiramate (Topamax) 25 mg tablet Take 1 tablet (25 mg) by mouth once daily as needed. 1/23/18  Yes Historical Provider, MD   nitrofurantoin, macrocrystal-monohydrate, (Macrobid) 100 mg capsule Take 1 capsule (100 mg) by mouth every 12 hours. 5/21/25   WALTER Ruiz   UNABLE TO FIND Breast prosthesis for left and/or right breast 10/24/22   Historical Provider, MD   UNABLE TO FIND Mastectomy Bra for Right Mastectomy 10/24/22   Historical Provider, MD   anastrozole (Arimidex) 1 mg tablet Take 1 tablet (1 mg total) by mouth once daily.  Swallow whole with a drink of water. 1/12/24 6/16/25  WALTER Rubin   anastrozole (Arimidex) 1 mg tablet Take 1 tablet (1 mg total) by mouth once daily.  Swallow whole with a drink of water. 1/20/25 6/16/25  Anna Olson MD   sodium hyaluronate (Durolane) 60 mg/3 mL injection Inject one syringe(60mg/3mL) into right knee one time at doctors office 8/2/24 6/18/25  Venita Yañez MD       Review of Systems   Constitutional: Negative for chills, decreased appetite, diaphoresis, fever and malaise/fatigue.   Eyes:  Negative for blurred vision and double vision.   Cardiovascular:  Negative for chest pain, claudication, cyanosis, irregular heartbeat, leg swelling, near-syncope and palpitations. Positive for baseline dyspnea on exertion.  Respiratory:  Negative for cough, hemoptysis, shortness of breath and wheezing.    Endocrine: Negative for cold intolerance, heat intolerance, polydipsia, polyphagia and polyuria.   Gastrointestinal:  Negative for abdominal pain, constipation, diarrhea, dysphagia, nausea and vomiting.   Genitourinary:  Negative for bladder incontinence, dysuria, hematuria, incomplete emptying, nocturia, frequency, pelvic pain and urgency.   Neurological:  Negative for headaches, light-headedness, paresthesias, sensory change and weakness.  "  Psychiatric/Behavioral:  Negative for altered mental status.    Musculoskeletal: Negative for myalgias. Positive for arthralgias     Vitals and nursing note reviewed.     Physical exam  Constitutional:       Appearance: Normal appearance. She is Obese.   HENT:      Head: Normocephalic and atraumatic.      Mouth/Throat:      Mouth: Mucous membranes are moist.      Pharynx: Oropharynx is clear.      Tongue graft present on exam with asymmetrical tongue base.   Eyes:      Extraocular Movements: Extraocular movements intact.      Conjunctiva/sclera: Conjunctivae normal.      Pupils: Pupils are equal, round, and reactive to light.   Cardiovascular:      PMI at left midclavicular line. Normal rate. Regular rhythm. Normal S1. Normal S2.       Murmurs: There is no murmur.      No gallop.  No click. No rub.       No audible carotid bruit     No lower extremity edema on exam  Pulmonary:      Effort: Pulmonary effort is normal.      Breath sounds: Normal breath sounds.   Abdominal:      General: Abdomen is flat. Bowel sounds are normal.      Palpations: Abdomen is soft and non-tender  Musculoskeletal:      Cervical back: Normal range of motion and neck supple.      Knee, right: Limited ROM  Skin:     General: Skin is warm and dry.      Capillary Refill: Capillary refill takes less than 2 seconds.   Neurological:      General: No focal deficit present.      Mental Status: She is alert and oriented to person, place, and time. Mental status is at baseline.   Psychiatric:         Mood and Affect: Mood normal.         Behavior: Behavior normal.         Thought Content: Thought content normal.         Judgment: Judgment normal.     Vitals and nursing note reviewed. Physical exam within normal limits.     Visit Vitals  /89   Pulse 78   Temp 36.6 °C (97.9 °F)   Resp 16   Ht 1.676 m (5' 6\")   Wt 89.4 kg (197 lb)   SpO2 98%   BMI 31.80 kg/m²   OB Status Postmenopausal   Smoking Status Never   BSA 2.04 m²     DASI Risk Score  "     Flowsheet Row Pre-Admission Testing from 6/18/2025 in [facility] Questionnaire Series Submission from 6/3/2025 in [facility] Care with Generic Provider Malathi   Can you take care of yourself (eat, dress, bathe, or use toilet)?  2.75 filed at 06/18/2025 1432 2.75  filed at 06/03/2025 2107   Can you walk indoors, such as around your house? 1.75 filed at 06/18/2025 1432 1.75  filed at 06/03/2025 2107   Can you walk a block or two on level ground?  2.75 filed at 06/18/2025 1432 0  filed at 06/03/2025 2107   Can you climb a flight of stairs or walk up a hill? 5.5 filed at 06/18/2025 1432 5.5  filed at 06/03/2025 2107   Can you run a short distance? 0 filed at 06/18/2025 1432 --   Can you do light work around the house like dusting or washing dishes? 2.7 filed at 06/18/2025 1432 2.7  filed at 06/03/2025 2107   Can you do moderate work around the house like vacuuming, sweeping floors or carrying groceries? 3.5 filed at 06/18/2025 1432 3.5  filed at 06/03/2025 2107   Can you do heavy work around the house like scrubbing floors or lifting and moving heavy furniture?  0 filed at 06/18/2025 1432 0  filed at 06/03/2025 2107   Can you do yard work like raking leaves, weeding or pushing a mower? 0 filed at 06/18/2025 1432 0  filed at 06/03/2025 2107   Can you have sexual relations? 5.25 filed at 06/18/2025 1432 5.25  filed at 06/03/2025 2107   Can you participate in moderate recreational activities like golf, bowling, dancing, doubles tennis or throwing a baseball or football? 0 filed at 06/18/2025 1432 0  filed at 06/03/2025 2107   Can you participate in strenous sports like swimming, singles tennis, football, basketball, or skiing? 0 filed at 06/18/2025 1432 0  filed at 06/03/2025 2107   DASI SCORE 24.2 filed at 06/18/2025 1432 --   METS Score (Will be calculated only when all the questions are answered) 5.7 filed at 06/18/2025 1432 --          Caprini DVT Assessment      Flowsheet Row Pre-Admission Testing  from 6/18/2025 in [facility] ED to Hosp-Admission (Discharged) from 5/10/2025 in [facility] 6 Surgical Oncology with Yandy Garvey MD and Ramona Person, DO   DVT Score (IF A SCORE IS NOT CALCULATING, MUST SELECT A BMI TO COMPLETE) 12 filed at 06/18/2025 1432 8 filed at 05/10/2025 1822   Medical Factors Present cancer, chemotherapy, or previous malignancy filed at 06/18/2025 1432 [facility], Present cancer, chemotherapy, or previous malignancy filed at 05/10/2025 1822   Surgical Factors Elective major lower extremity arthroplasty filed at 06/18/2025 1432 --   BMI (BMI MUST BE CHOSEN) 31-40 (Obesity) filed at 06/18/2025 1432 31-40 (Obesity) filed at 05/10/2025 1822          Modified Frailty Index      Flowsheet Row Pre-Admission Testing from 6/18/2025 in [facility]   Non-independent functional status (problems with dressing, bathing, personal grooming, or cooking) 0 filed at 06/18/2025 1433   History of diabetes mellitus  0 filed at 06/18/2025 1433   History of COPD 0 filed at 06/18/2025 1433   History of CHF No filed at 06/18/2025 1433   History of MI 0 filed at 06/18/2025 1433   History of Percutaneous Coronary Intervention, Cardiac Surgery, or Angina No filed at 06/18/2025 1433   Hypertension requiring the use of medication  0.0909 filed at 06/18/2025 1433   Peripheral vascular disease 0 filed at 06/18/2025 1433   Impaired sensorium (cognitive impairement or loss, clouding, or delirium) 0 filed at 06/18/2025 1433   TIA or CVA withouy residual deficit 0 filed at 06/18/2025 1433   Cerebrovascular accident with deficit 0 filed at 06/18/2025 1433   Modified Frailty Index Calculator .0909 filed at 06/18/2025 1433          [id number] Stroke Risk Points  Current as of 6 minutes ago        N/A 0 to 9 Points:      Last Change: N/A          The [id number] risk score (Lip CECILIA, et al. 2009. © 2010 [employer]) quantifies the risk  of stroke for a patient with atrial fibrillation. For patients without atrial fibrillation or under the age of 18 this score appears as N/A. Higher score values generally indicate higher risk of stroke.        This score is not applicable to this patient. Components are not calculated.          Revised Cardiac Risk Index      Flowsheet Row Pre-Admission Testing from 6/18/2025 in [facility]   High-Risk Surgery (Intraperitoneal, Intrathoracic,Suprainguinal vascular) 0 filed at 06/18/2025 1433   History of ischemic heart disease (History of MI, History of positive exercuse test, Current chest paint considered due to myocardial ischemia, Use of nitrate therapy, ECG with pathological Q Waves) 0 filed at 06/18/2025 1433   History of congestive heart failure (pulmonary edemia, bilateral rales or S3 gallop, Paroxysmal nocturnal dyspnea, CXR showing pulmonary vascular redistribution) 0 filed at 06/18/2025 1433   History of cerebrovascular disease (Prior TIA or stroke) 0 filed at 06/18/2025 1433   Pre-operative insulin treatment 0 filed at 06/18/2025 1433   Pre-operative creatinine>2 mg/dl 0 filed at 06/18/2025 1433   Revised Cardiac Risk Calculator 0 filed at 06/18/2025 1433          Apfel Simplified Score    No data to display       Risk Analysis Index Results This Encounter    No data found in the last 10 encounters.       Stop Bang Score      Flowsheet Row Pre-Admission Testing from 6/18/2025 in [facility] Questionnaire Series Submission from 6/3/2025 in [facility] with Generic Provider Malathi   Do you snore loudly? 0 filed at 06/18/2025 1347 0  filed at 06/03/2025 2107   Do you often feel tired or fatigued after your sleep? 1 filed at 06/18/2025 1347 0  filed at 06/03/2025 2107   Has anyone ever observed you stop breathing in your sleep? 0 filed at 06/18/2025 1347 0  filed at 06/03/2025 2107   Do you have or are you being treated for high blood pressure? 1 filed at 06/18/2025 1347 0   filed at 06/03/2025 2107   Recent BMI (Calculated) 31.8 filed at 06/18/2025 1347 31.2  filed at 06/03/2025 2107   Is BMI greater than 35 kg/m2? 0=No filed at 06/18/2025 1347 0=No  filed at 06/03/2025 2107   Age older than 50 years old? 1=Yes filed at 06/18/2025 1347 1=Yes  filed at 06/03/2025 2107   Is your neck circumference greater than 17 inches (Male) or 16 inches (Female)? 0 filed at 06/18/2025 1347 --   Gender - Male 0=No filed at 06/18/2025 1347 0=No  filed at 06/03/2025 2107   STOP-BANG Total Score 3 filed at 06/18/2025 1347 --          Prodigy: High Risk  Total Score: 8              Prodigy Age Score           ARISCAT Score for Postoperative Pulmonary Complications      Flowsheet Row Pre-Admission Testing from 6/18/2025 in [facility]   Age Calculated Score 3 filed at 06/18/2025 1433   Preoperative SpO2 0 filed at 06/18/2025 1433   Respiratory infection in the last month Either upper or lower (i.e., URI, bronchitis, pneumonia), with fever and antibiotic treatment 0 filed at 06/18/2025 1433   Preoperative anemia (Hgb less than 10 g/dl) 0 filed at 06/18/2025 1433   Surgical incision  0 filed at 06/18/2025 1433   Duration of surgery  16 filed at 06/18/2025 1433   Emergency Procedure  0 filed at 06/18/2025 1433   ARISCAT Total Score  19 filed at 06/18/2025 1433          Ignacio Perioperative Risk for Myocardial Infarction or Cardiac Arrest (MAURILIO)      Flowsheet Row Pre-Admission Testing from 6/18/2025 in [facility]   Calculated Age Score 1.38 filed at 06/18/2025 1433   Functional Status  0 filed at 06/18/2025 1433   ASA Class  -3.29 filed at 06/18/2025 1433   Creatinine 0 filed at 06/18/2025 1433   Type of Procedure  0.80 filed at 06/18/2025 1433   MAURILIO Total Score  -6.36 filed at 06/18/2025 [facility]   MAURILIO % 0.17 filed at 06/18/2025 1433          Assessment & Plan:    Neuro:  No diagnosis or significant findings on chart review or clinical presentation and evaluation.      HEENT/Airway:  No diagnosis or significant findings on chart review or clinical presentation and evaluation.   STOP-BANG Score-3 points moderate risk for ESPINOZA    Mallampati::  IV    TM distance::  >3 FB    Neck ROM::  Full  Dentures-denies  Crowns-reports x 4-5 crowns  Implants-denies    Cardiovascular:  No significant findings on chart review or clinical presentation and evaluation.   History of Hyperlipidemia-Managed with Rosuvastatin  METS: 5.7  RCRI: 0 points, 3.9%  risk for postoperative MACE   MAURILIO: 0.17% risk for postoperative MACE  EKG -Completed 6/18/2025  Normal sinus rhythm  Normal EKG          Pulmonary:  No diagnosis or significant findings on chart review or clinical presentation and evaluation.   ARISCAT: <26 points, 1.6% risk of in-hospital postoperative pulmonary complication  PRODIGY: Moderate risk for opioid induced respiratory depression  Smoking History-She has never smoked.  Discussed smoking cessation and deep breathing handout given    Renal/Urinary:  No diagnosis or significant findings on chart review or clinical presentation and evaluation, however, the patient is at increased risk of perioperative renal complications secondary to age>/= 56 and HTN. Preventative measures include BP monitoring, medication compliance, and hydration management.   History of Chronic kidney disease-Will check renal function in PAT  CMP-Reviewed, stable  Creatinine-1.48  GFR-38    Endocrine:  No diagnosis or significant findings on chart review or clinical presentation and evaluation.   OVT7E-8.2%    Hematologic/Immunology:  No significant findings on chart review or clinical presentation and evaluation.  History of Breast cancer-Diagnosed in 2023. Treated with right sided mastectomy and chemoradiation. Patient is currently taking Anastrozole and Verzenio(last dose is on 6/21/2025).   History of Tongue cancer-Squamous cell. Diagnosed in 2018. esophagoscopy, tracheostomy, left selective neck dissection, left  glossectomy, left forearm FF to the left glossectomy defect, FTSG, VAC, splint on 6/29/18.  Overall, she has recovered very well from surgery. She admits to improved ankyloglossia and speech.   The patient is not a Jehovah’s witness and will accept blood and blood products if medically indicated.   History of previous blood transfusions Yes - No reported transfusion reactions  CBC-Reviewed, stable  Positive for Anemia-HGB 11.7. Normocytic and normochromic. Baseline.  Positive for Leukopenia-WBC 3.4. Per differential absolute lymphocytes 0.99, otherwise WNL. Baseline.   Caprini Score 12, patient at High for postoperative DVT. Pt supplied education/VTE handout  Anticoagulation use: No     Gastrointestinal:   No significant findings on chart review or clinical presentation and evaluation.   History of Gastroesophageal reflux disease-Managed with Omeprazole  Recreational drug use: alcohol  Alcohol use social drinker    Infectious disease:   No diagnosis or significant findings on chart review or clinical presentation and evaluation.   Prescription provided for CHG body wash and dental rinse. CHG use instructions reviewed and provided to patient.  Staph screen collected-Negative    Musculoskeletal:   No diagnosis on chart review or clinical presentation and evaluation.   Positive for Obesity-BMI 31.80  JHFRAT score-6 points. moderate risk for falls    Anesthesia:  ASA 2 - Patient with mild systemic disease with no functional limitations  Anticipated anesthesia-spinal  History of General anesthesia- yes  Complications- PONV and difficult airway. Patient had tongue/neck reconstruction surgery. Mallampati IV with asymmetrical tongue base and a history of difficult airway. Dr Melara in to assess patients airway in PAT. Ok to proceed at this time.   No family history of anesthesia complications    Labs & Imaging ordered:  CBC, CMP, HBA1C, MRSA, EKG  Nickel/metal allergy-negative  Shellfish allergy-negative Does report iodine  sensitivity.     Overall, patient Moderate Risk for the scheduled Moderate Risk surgery. Discussed with patient medication instructions, NPO guidelines, and any questions or concerns.     Face to face time spent 45 minutes  All resulted testing from PAT was forwarded to the surgeon and the patient's PCP if applicable.     Preoperative clearance requested from Cardiology  Reason: Cardiac history  Risk stratification: Low cardiac risk  Provider: Dr Yu Marsh  Recommendations: No further cardiac testing is indicated. See note from 6/27/2025  Surgery: right total knee arthroplasty/resurfacing on 7/2/2025 with Dr. Nevarez       [1]   Past Medical History:  Diagnosis Date    Arthritis 5 years ago    BPH (benign prostatic hyperplasia) 10 years ago    I have since brought my BP down. No need for meds    Breast cancer     Depression 20 years ago    Encounter for gynecological examination (general) (routine) without abnormal findings     Encounter for gynecological examination without abnormal finding    Encounter for screening mammogram for malignant neoplasm of breast     Visit for screening mammogram    GERD (gastroesophageal reflux disease) 20 years ago    Head and neck cancer (Multi) April 2020    History of transfusion     Hyperlipidemia April 2025    Hypertension 15 years ago    Irritable bowel syndrome When i was 19 years old    Kidney stone     Obesity     I have been over weight  for several years    Personal history of irradiation     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    Personal history of other diseases of the circulatory system     History of hypertension    Urinary tract infection 40 years ago   [2]   Past Surgical History:  Procedure Laterality Date    AMPUTATION      APPENDECTOMY  10/05/2015    Appendectomy    BREAST BIOPSY      HYSTERECTOMY  10/05/2015    Supracervical Hysterectomy    MASTECTOMY Right 2022    OTHER SURGICAL HISTORY  06/27/2022     Tongue surgery    OTHER SURGICAL HISTORY  06/27/2022    Neck dissection modified radical    OTHER SURGICAL HISTORY  08/04/2020    Split thickness skin graft    TOTAL ABDOMINAL HYSTERECTOMY W/ BILATERAL SALPINGOOPHORECTOMY  10/05/2015    Salpingo-oophorectomy Bilateral   [3]   Family History  Problem Relation Name Age of Onset    Aortic stenosis Mother Gerda     Hyperlipidemia Mother Gerda     Hypertension Mother Gerda     Stroke Mother Gerda     Arthritis Mother Gerda     Osteoarthritis Mother Gerda     Diabetes Father Chaz     Stomach cancer Father Chaz     Colon cancer Father's Brother      Cancer Other     [4]   Allergies  Allergen Reactions    Iodine Hives    Iodinated Contrast Media Rash, Swelling and Unknown    Sulfa (Sulfonamide Antibiotics) Rash, Swelling, Unknown and Hives

## 2025-06-19 ENCOUNTER — PATIENT OUTREACH (OUTPATIENT)
Dept: CARE COORDINATION | Facility: CLINIC | Age: 70
End: 2025-06-19
Payer: COMMERCIAL

## 2025-06-19 LAB
ATRIAL RATE: 74 BPM
P AXIS: 5 DEGREES
P OFFSET: 189 MS
P ONSET: 146 MS
PR INTERVAL: 156 MS
Q ONSET: 224 MS
QRS COUNT: 12 BEATS
QRS DURATION: 90 MS
QT INTERVAL: 412 MS
QTC CALCULATION(BAZETT): 457 MS
QTC FREDERICIA: 442 MS
R AXIS: 18 DEGREES
T AXIS: 29 DEGREES
T OFFSET: 430 MS
VENTRICULAR RATE: 74 BPM

## 2025-06-20 LAB
EST. AVERAGE GLUCOSE BLD GHB EST-MCNC: 103 MG/DL
HBA1C MFR BLD: 5.2 % (ref ?–5.7)
STAPHYLOCOCCUS SPEC CULT: NORMAL

## 2025-06-23 ENCOUNTER — OFFICE VISIT (OUTPATIENT)
Dept: ORTHOPEDIC SURGERY | Facility: CLINIC | Age: 70
End: 2025-06-23
Payer: COMMERCIAL

## 2025-06-23 ENCOUNTER — HOSPITAL ENCOUNTER (OUTPATIENT)
Dept: RADIOLOGY | Facility: CLINIC | Age: 70
Discharge: HOME | End: 2025-06-23
Payer: COMMERCIAL

## 2025-06-23 ENCOUNTER — APPOINTMENT (OUTPATIENT)
Dept: RADIOLOGY | Facility: CLINIC | Age: 70
End: 2025-06-23
Payer: COMMERCIAL

## 2025-06-23 DIAGNOSIS — M25.561 CHRONIC PAIN OF RIGHT KNEE: ICD-10-CM

## 2025-06-23 DIAGNOSIS — M25.561 CHRONIC PAIN OF RIGHT KNEE: Primary | ICD-10-CM

## 2025-06-23 DIAGNOSIS — G89.29 CHRONIC PAIN OF RIGHT KNEE: ICD-10-CM

## 2025-06-23 DIAGNOSIS — G89.29 CHRONIC PAIN OF RIGHT KNEE: Primary | ICD-10-CM

## 2025-06-23 PROBLEM — Z96.651 HISTORY OF TOTAL KNEE ARTHROPLASTY, RIGHT: Status: ACTIVE | Noted: 2025-06-23

## 2025-06-23 PROCEDURE — 99212 OFFICE O/P EST SF 10 MIN: CPT | Performed by: STUDENT IN AN ORGANIZED HEALTH CARE EDUCATION/TRAINING PROGRAM

## 2025-06-23 PROCEDURE — 73564 X-RAY EXAM KNEE 4 OR MORE: CPT | Mod: RIGHT SIDE | Performed by: RADIOLOGY

## 2025-06-23 PROCEDURE — 99214 OFFICE O/P EST MOD 30 MIN: CPT | Performed by: STUDENT IN AN ORGANIZED HEALTH CARE EDUCATION/TRAINING PROGRAM

## 2025-06-23 PROCEDURE — 77073 BONE LENGTH STUDIES: CPT | Performed by: RADIOLOGY

## 2025-06-23 PROCEDURE — 73564 X-RAY EXAM KNEE 4 OR MORE: CPT | Mod: RT

## 2025-06-23 PROCEDURE — 77073 BONE LENGTH STUDIES: CPT

## 2025-06-23 RX ORDER — PREGABALIN 75 MG/1
75 CAPSULE ORAL 2 TIMES DAILY
Qty: 28 CAPSULE | Refills: 0 | Status: SHIPPED | OUTPATIENT
Start: 2025-06-23 | End: 2025-07-16

## 2025-06-23 RX ORDER — DOCUSATE SODIUM 100 MG/1
100 CAPSULE, LIQUID FILLED ORAL 2 TIMES DAILY
Qty: 60 CAPSULE | Refills: 0 | Status: SHIPPED | OUTPATIENT
Start: 2025-06-23 | End: 2025-08-01

## 2025-06-23 RX ORDER — CEFADROXIL 500 MG/1
500 CAPSULE ORAL 2 TIMES DAILY
Qty: 14 CAPSULE | Refills: 0 | Status: SHIPPED | OUTPATIENT
Start: 2025-06-23 | End: 2025-07-09

## 2025-06-23 RX ORDER — OXYCODONE HYDROCHLORIDE 5 MG/1
5 TABLET ORAL EVERY 6 HOURS PRN
Qty: 28 TABLET | Refills: 0 | Status: SHIPPED | OUTPATIENT
Start: 2025-06-23 | End: 2025-07-09

## 2025-06-23 RX ORDER — ACETAMINOPHEN 500 MG
1000 TABLET ORAL EVERY 8 HOURS PRN
Qty: 90 TABLET | Refills: 0 | Status: SHIPPED | OUTPATIENT
Start: 2025-06-23

## 2025-06-23 RX ORDER — TRAMADOL HYDROCHLORIDE 50 MG/1
50 TABLET, FILM COATED ORAL EVERY 6 HOURS PRN
Qty: 28 TABLET | Refills: 0 | Status: SHIPPED | OUTPATIENT
Start: 2025-06-23 | End: 2025-07-09

## 2025-06-23 RX ORDER — POLYETHYLENE GLYCOL 3350 17 G/17G
17 POWDER, FOR SOLUTION ORAL DAILY
Qty: 510 G | Refills: 0 | Status: SHIPPED | OUTPATIENT
Start: 2025-06-23 | End: 2025-08-01

## 2025-06-23 NOTE — PROGRESS NOTES
"VICTOR M/TKA Related Summary           L hip: N  L knee: N  R hip: N  R knee: N  Lumbar surgery or significant pathology: N            CC/SUBJECTIVE/HPI            Radha Shabazz is a 70 y.o. female presenting for preop evaluation for R primary TKA.    Living/Discharge Arrangements  Preferred pharmacy: [facility]  Planned same day DC: Y  Assistance level: independent  Cohabitants: spouse  Planned care partner: spouse  Anticipated DC location: home            HISTORIES            Reports no major changes in health, substance use, or baseline medications.            OBJECTIVE            Physical exam  Estimated body mass index is 33.04 kg/m² as calculated from the following:    Height as of 7/10/24: 1.651 m (5' 5\").    Weight as of 2/10/25: 90.1 kg (198 lb 8.4 oz).  Gen/psych: NAD, conversational, appropriate     Ambulation  Gait: antalgic  Assistive device: none  Spine  Lumbar tenderness: neg  Limited ROM: neg, with no radiation or pain with flex/ex, lateral bending  SLR test: neg     Focused MSK exam: R  Knee  Skin/incision: normal  Effusion: mild  Alignment: varus (partially correctable)  Knee tenderness: diffuse  Crepitation: moderate  Extension: passive flexion contracture 10° and active extension lag 0°  Flexion: 100°  Anterior drawer: stable  Posterior drawer: stable  Varus/Valgus in extension: stable  Varus/Valgus in flexion: stable  Referred pain to knee with hip 90° flexion and 45° ER/IR: neg  Neurovascular             Strength: 5/5 hip/knee/ankle flexion and extension  Sensory (L2-S1): SILT throughout lower extremity  Edema/stasis: no pitting edema  Pulse: DP 1+, PT 1+    Imaging  No new imaging today.            ASSESSMENT & PLAN           Patient verbalized understanding of below A&P. All questions answered.  R knee DJD  Logistics (day of surgery timing, etc) covered in Preop Joints Class.  Planned incision site discussed  Proceed with surgery  Surgical risks discussion: See consent.  PMH, PSH, other " considerations discussed  Timing: On breast ca treatment that inhibits vascular healing. Cleared by onc team, hold Verzenio 7d pre and postop. Letter in Epic media. Today, pt reports she has stopped it 4d ago and does not need to go back on it.  BMI<40 but on spectrum of increased risk of surgical complications.  May affect periop anticoag choice: Breast ca on anastrozole, confirmed still taking  Tongue cancer, treatment completed  Osteoporosis  L GT bursitis, IT band syndrome  SDD candidate  Listed in EMR but not endorsed by pt or evident on exam today: Generalized weakness  Occupation: RN (currently pain mgmt, has worked in ortho previously)  Hobbies: Not specified  PCP: Vineet Lemus MD

## 2025-06-23 NOTE — DISCHARGE INSTRUCTIONS
Yusuf Nevarez MD  Adult Reconstruction and Joint Replacement  Office: [facility] (Phone [phone], Fax [phone], or [place])      Thank you for choosing Dr. Nevarez for your joint replacement surgery. Below you will find instructions for after surgery in a variety of topics with answers to frequently asked questions. These instructions apply to most patients, but those with complex surgical or medical issues may vary. Please carefully read them and contact our office with questions.    Communication Methods  MyChart will continue to be the best way for us to communicate, but you may also call the office if you prefer.    Your Team and Whom to Contact  Dr. Nevarez's team is available to answer your questions. The best person to contact depends on your question and surgery location.  [facility] (contact information above): Scheduling office visits, prescription refills, medical concerns, leave of Absence, FMLA, or other paperwork.  Nursing Coordinators (If your surgery was at [facility]: Angelo Rodriguez ([phone]), Bria Perez BSN-BC ([phone])). If your surgery was at [facility]: Rona Steen MBA, RN-BC ([phone])): Nursing or wound care questions within 6 weeks after surgery, prescription for outpatient physical therapy.  Velma Carrion ([phone], [phone]) or Elvi Marie ([phone]): Durable medical equipment (walker, braces, elevated toilet seat, etc.)    FMLA, Disability, and Return to Work Forms  Please submit all forms directly to [facility] (contact information above). Do not leave paperwork with the clinic staff.    Home Arrangements  For the first 3-5 days and nights after surgery, you will need a care partner (friend or family) with you at home. Alternatively, you may stay with friends or family. Research has shown that you will recover much better at home than at rehab.    Physical Therapy  All Patients  Physical therapy will begin in your home within  1-2 days after discharge. The care coordinator during your surgical stay should have arranged these services with a company of your choice before you were discharged from the hospital. Until starting home therapy, continue the exercises in your preoperative paperwork.  Depending on your condition, your team may have provided a knee immobilizer (stiff brace that keeps your leg straight). Wear this when ambulating until you feel your leg is strong enough to be safe without it.  Hip Replacements: Additional physical therapy outside your home is not required. In most cases, simply walking and gradually returning to activities is all that is needed. If you would like additional therapy, we are happy to prescribe it.  Knee Replacements: Additional physical therapy outside your home is required and should start 2-3 weeks after surgery.  Call the physical therapy location of your choice the day after you are discharged from the hospital to schedule the appointment well ahead of time and ensure there are no delays in starting outpatient therapy.    Clinic Appointments  2-3 weeks after surgery: If you have any skin stitches or staples, they will likely be removed at this time. This appointment should have been scheduled before your surgery.  6 weeks after surgery: X-ray  1 year after surgery: X-rays  Every 5 years after surgery (5, 10, 15…): X-rays    Restarting Your Home Diet and Medications  Resume your normal diet when you return home. Be sure to drink plenty of water and eat a substantial amount of protein (to help your body heal and rebuild) and fiber (to promote good bowel habits and prevent constipation).  Constipation is common from a combination of inactivity, anesthesia, and pain medication. In addition to the dietary recommendations above, physical activity such as walking also helps stimulate your bowels.   The PAT will advise you on restarting your medications. See your discharge paperwork for more  details.    Goals and Limitations  Being aware of goals and limitations will help you take great care of your new joint. The below guidelines are for the first 3 months after surgery. After that time, please discuss increasing activities with the team.  All Patients  Unless the team tells you otherwise, you may put all of your weight on your new joint.  Activities to avoid: Sharp pivots, deep squats, jolting impact on your joint (running, jumping), and crossing your legs (Both lying and sitting. Have a pillow between your legs when sleeping on your side or rolling over.)  Knee Replacements:   Range of Motion Goal: Minimum of 90 degrees with eventual progression to as much as 120 with continued exercise and time.  Activities to avoid: Kneeling. NEVER place a pillow/blanket under your knee when sitting or lying. Although this position is comfortable, it allows your knee to rest flexed. In the long term, this can lead to an inability to straighten your leg all the way, which is a very difficult problem to treat. Instead, keep your knee completely straight by placing a pillow or blanket under your heel.  Hip Replacements:  Anterior hip replacement activities to avoid: Large steps backwards, pointing your toes away from the middle of your body, lying on your stomach, and stretching/arching your back  Posterior hip replacement activities to avoid: Bending forward at the waist to  items or put on shoes/socks/underwear (instead have your arms between your legs and bend at your knees), bending your hip more than 90 degrees when getting on/off of low chairs, toilets, and sofas (make sure you knees are always below the level of your hip and use your arms to help you up instead of leaning forward), and pointing your toes inwards    Wound Care and Personal Hygiene  Your rubber bandage is waterproof. You may shower with it on. Your home therapst will remove it 1 week after surgery.   For knee replacements: The therapist  will leave it off. You can continue to shower, letting water run over it and lightly cleaning with regular soap (do not scrub).  Hip replacementss: The therapist will replace it once per week with a similar bandage until your first clinic appointment  Do not bath, swim, or soak your incision until your team tells you it is okay (typically at least 6 weeks after surgery).  If you have continued drainage or bleeding, send the team a picture through [employer] with a description of the drainage (color/odor.amount) and whether you are having any other symptoms (fever/nausea/vomit). If you had a knee replacement, wrap your leg with an ace wrap.  If you have Steri-Strips (small white pieces of tape) across your incision, leave the strips in place until they fall off their own.   If you have staples or stiches outside your skin, they will be removed at your 2 week follow-up appointment. You can let water run over these but should not soak them in water..  You may see small clear stitch tails sticking out of the incision. Do not cut or pick them. Cover them with a bandage until they dissolve on their own.  Do not apply lotions/creams/ointments to the incision until the team tells you it is okay (typically at least 8 weeks after surgery). Once approved, you may use vitamin E on the skin.    Pain, Swelling, Bruising, Numbness  Pain and swelling are normal for up to one year and can increase with excess activity or exercise. They can be reduced with rest, ice, compression stockings, leg elevation, and the prescribed pain medication regimen.  Wear the provided compression stockings during the day for one month after surgery, removing at night since your legs are elevated.  Bruising is normal for several weeks and will gradually subside.  Numbness or tingling in the skin surrounding the incision is common. Normal sensation may or may not return.  In general, it is common for the surgical area to not return completely to the way it  was before surgery.    Clicking  Depending on the type of implant used in your joint, clicking with motion can be normal and is a result of the mechanical parts replacing the function of your original joint.    Driving and Travel  To help prevent blood clots, you should wait at least 6weeks before traveling long distances after surgery. If traveling by car, stop and walk around every hour. If traveling by plane, walk the aisle every hour and perform ankle pumps while seated.  When it is safe to return to driving after surgery is at your discretion. The primary concern is the ability to quickly and firmly press the brake pedal. At a minimum, you must be off all narcotic pain medications and able to walk with a cane.    Ice and Cold Therapy  All patients should use ice for at least the first 6 weeks after surgery to help with pain and swelling.  Do not place ice directly on skin. Always have a layer of protection (clothing, towel) between them.   If using ice/gel packs, alternate 20 minutes on/off.  If using an ice machine or cold therapy device, follow the usage and cleaning instructions provided with it. Typically, you do not need to take breaks like you do with ice/gel packs. You should detach the pad from the cooler, check your skin under the pad, and ambulate at least once every hour. You may wear the cold therapy pad to sleep. Empty the cooler and pad when not using the device.     Emergencies  Reasons to call the office:  24 hours of fever above 101 F.  Excruciating pain not helped even when following the pain medication instructions.  Increased swelling not helped with rest, elevation, and ice.  New calf pain that is warm and tender to touch.  You have excessive bleeding from the incision that will not slow down. A small amount of drainage is normal and expected. When this occurs, apply pressure and cover the wound, only checking on it every 4-6 hours so that pressure stays applied.  Signs of an infection  around the incision (excessive drainage soaking through dressing (especially if it is colored/malodorous), redness spreading out from the edges of your incision, or significantly increased warmth around the area (some warmth is normal)).  Reason to go directly to the emergency room or call 911:  You experience chest pain or difficulty breathing.    Travel Program  If you are part of the travel program ([employer], [place], [employer] employees), follow the same above instructions. The one difference is that you need to see your surgeon in clinic 1 week before traveling home. For questions, contact Stacy Smith RN ( [phone]).    Discharge Medication Instructions  Carefully follow instructions  Many problems that patients develop after discharge are a result of not taking the proper medications at the proper time. Please read and carefully follow the below instructions and pharmacy labels to maximize your successful recovery.  Medication refills  Only Ultram and oxycodone are eligible for refills. The government strictly controls these medications. In special circumstances, refills may be provided once every 7 days for up to 6 weeks after surgery.  Refill requests are only processed Monday through Friday before 1pm. If you anticipate needing a refill, contact the office several days before to account for this schedule. If you leave a message, specify your preferred pharmacy location.  You will not receive a return call stating your prescription is refilled. Please contact your pharmacy to confirm it is available.  The other medications will only be prescribed once when you leave the hospital.  Side effects  Side effects are common, especially with tramadol and oxycodone. Please read the pharmacy packages carefully.    Discharge Medication List  Pain medication: Treat pain medications like building blocks. Always start by taking the medications at the bottom of the stack. If your pain is uncontrolled, continue taking those  bottom medications and add a higher block. When your pain is controlled, take down the stack by removing the higher blocks first.   Bottom blocks (for mild pain)  Acetaminophen (Tylenol): If you have liver problems, please consult with your primary care physician regarding the dose.  Pregabalin (Lyrica), meloxicam (Mobic), and celecoxib (Celebrex): Do not be alarmed if you do not receive a prescription for these medications. They cannot be taken with certain medical conditions or ages, so your team may not have provided them.  Middle block: Tramadol (Ultram) (for moderate pain)  Your goal is to be completely off of this medication 1-2 weeks after surgery.  Do not take this medication at the same time as oxycodone.  Do not operate a motor vehicle while taking this medication.  Top block: Oxycodone (for severe pain)  Same instructions as tramadol.  Do not take this medication at the same time as tramadol.    Blood thinner  This medication is to prevent blood clots from forming in your legs or lungs. Take it as prescribed for the entire duration.  The specific medication prescribed to you depends on a variety of factors. Possibilities include a medication you were on before surgery, aspirin, Eliquis, Xarelto, Lovenox, Warfarin, or some combination thereof.  Note: If you are prescribed aspirin, it is very important that you take it for the entire prescription duration. Do not stop taking it if your pain is under control. This prescription is to prevent blood clots, not to control pain.  Anti-nausea (Proton pump inhibitor (PPI))  This medication is to prevent nausea and protect your stomach from ulcers while taking the other prescribed medications. Take it as prescribed for the entire duration.  If you were already on a PPI before surgery, you will resume that medication. If not, you will be provided a prescription for pantoprazole (Protonix).   Anti-constipation  Constipation is a common side effect of tramadol and  oxycodone.  You have been provided prescriptions for stool softeners (docusate sodium (Colace) and polyethylene glycol (Miralax)). Take both of these medications while taking tramadol and oxycodone to help minimize constipation.  If you continue to have constipation despite taking these two medications, you may add over the counter bisacodyl (Dulcolax) suppositories.     Example Medication Schedules  Since you will be taking multiple medications, patients have found the below example schedules to be helpful when weaning off pain medication. They do not show your blood thinner since that will vary by patient.  Step 1: When you first return home and have the most pain  All the time: Ice/cold therapy  3am: Pain (Ultram)  6am: Pain (oxycodone), Constipation (Miralax)  8am: Pain (Tylenol)  9am: Pain (Ultram (and Mobic/Celebrex/Lyrica for some patients)), PPI (Protonix), Constipation (Colace)  Noon: Pain (oxycodone)  3pm: Pain (Ultram)  4pm: Pain (Tylenol)  6pm: Pain (oxycodone), Constipation (Miralax)  9pm: Pain (Ultram (and Celebrex/Lyrica for some patients)), constipation (Colace)  Midnight: Pain (Tylenol, oxycodone)  Step 2: As your pain begins to decrease, you can space out your pain medications  All the time: Ice/cold therapy  4am: Pain (oxycodone), Constipation (Colace)  8am: Pain (Tylenol, Ultram (and Mobic/Celebrex/Lyrica for some patients)), PPI (Protonix)  Noon: Pain (oxycodone), Constipation (Miralax)  4pm: Pain (Tylenol, Ultram), Constipation (Colace)  8pm: Pain (oxycodone (and Celebrex/Lyrica for some patients))  Midnight: Pain (Tylenol, Ultram)  Step 3: As your pain continues to decrease, you can remove the top block of your pain pyramid (oxycodone)  All the time: Ice/cold therapy  4am: Pain (Tylenol), Constipation (Colace)  8am: Pain (Ultram (and Mobic/Celebrex/Lyrica for some patients)), PPI (Protonix)  Noon: Pain (Tylenol), Constipation (Miralax)  4pm: Pain (Ultram), Constipation (Colace)  8pm: Pain  (Tylenol (and Celebrex/Lyrica for some patients))  Midnight: Pain (Ultram)  Step 4: As your pain continues to decrease, you can remove the middle block of your pain pyramid (Ultram) and the constipation medication  All the time: Ice/cold therapy  4am: Pain (Tylenol)  Noon: Pain (Tylenol (and Mobic/Celebrex/Lyrica for some patients)), PPI (Protonix)  8pm: Pain (Tylenol)  Midnight: Pain (Celebrex/Lyrica for some patients)

## 2025-06-25 ENCOUNTER — PATIENT OUTREACH (OUTPATIENT)
Dept: CARE COORDINATION | Facility: CLINIC | Age: 70
End: 2025-06-25
Payer: COMMERCIAL

## 2025-06-25 ENCOUNTER — APPOINTMENT (OUTPATIENT)
Dept: ORTHOPEDIC SURGERY | Facility: HOSPITAL | Age: 70
End: 2025-06-25
Payer: COMMERCIAL

## 2025-06-26 ENCOUNTER — TELEPHONE (OUTPATIENT)
Dept: ORTHOPEDIC SURGERY | Facility: HOSPITAL | Age: 70
End: 2025-06-26
Payer: COMMERCIAL

## 2025-06-26 ENCOUNTER — HOME HEALTH ADMISSION (OUTPATIENT)
Dept: HOME HEALTH SERVICES | Facility: HOME HEALTH | Age: 70
End: 2025-06-26
Payer: COMMERCIAL

## 2025-06-26 NOTE — TELEPHONE ENCOUNTER
Thank you for taking my call today.  All questions were answered at the time of the call, but please feel free to reach out to me via MyChart or phone, [phone], with any new questions or concerns.       We confirmed that you opted to enroll in our [id number] program so your discharge prescriptions will be available to take home at the time of discharge.  Please bring any prescription insurance coverage with you on the morning of surgery so that we can enter the information into our system.     We confirmed that your plan would be to Discharge Home same day (Rapid [facility]).    We confirmed that you have DME needed for recovery.     Use the provided body wash for 4 days before surgery and complete a 5th shower on the morning of surgery, this includes your body and hair.  Follow the directions as provided during preadmission testing.  The mouth wash will be used the night before and the morning of surgery.       As a reminder, if you do not hear from our team, please call [phone] between 2pm and 3pm the business day before your surgery to confirm your arrival time and details.        Please don't hesitate to reach out with additional questions or concerns.    Thank you,  XU FloresN, RN  Hawa Johnson, RN  Orthopedic Program Navigators  [facility]  [phone]

## 2025-06-27 ENCOUNTER — OFFICE VISIT (OUTPATIENT)
Dept: CARDIOLOGY | Facility: CLINIC | Age: 70
End: 2025-06-27
Payer: COMMERCIAL

## 2025-06-27 VITALS
OXYGEN SATURATION: 96 % | BODY MASS INDEX: 31.43 KG/M2 | SYSTOLIC BLOOD PRESSURE: 102 MMHG | DIASTOLIC BLOOD PRESSURE: 68 MMHG | HEART RATE: 79 BPM | WEIGHT: 194.7 LBS

## 2025-06-27 DIAGNOSIS — Z01.818 PRE-OPERATIVE CLEARANCE: Primary | ICD-10-CM

## 2025-06-27 PROCEDURE — 3074F SYST BP LT 130 MM HG: CPT | Performed by: HOSPITALIST

## 2025-06-27 PROCEDURE — 99213 OFFICE O/P EST LOW 20 MIN: CPT | Performed by: HOSPITALIST

## 2025-06-27 PROCEDURE — 3078F DIAST BP <80 MM HG: CPT | Performed by: HOSPITALIST

## 2025-06-27 PROCEDURE — 99212 OFFICE O/P EST SF 10 MIN: CPT

## 2025-06-27 PROCEDURE — 1159F MED LIST DOCD IN RCRD: CPT | Performed by: HOSPITALIST

## 2025-06-27 RX ORDER — PREDNISOLONE ACETATE 10 MG/ML
SUSPENSION/ DROPS OPHTHALMIC
COMMUNITY
Start: 2024-12-06

## 2025-06-27 ASSESSMENT — COLUMBIA-SUICIDE SEVERITY RATING SCALE - C-SSRS
2. HAVE YOU ACTUALLY HAD ANY THOUGHTS OF KILLING YOURSELF?: NO
1. IN THE PAST MONTH, HAVE YOU WISHED YOU WERE DEAD OR WISHED YOU COULD GO TO SLEEP AND NOT WAKE UP?: NO
6. HAVE YOU EVER DONE ANYTHING, STARTED TO DO ANYTHING, OR PREPARED TO DO ANYTHING TO END YOUR LIFE?: NO

## 2025-06-27 NOTE — PROGRESS NOTES
Subjective   Radha Shabazz is a 70 y.o. female with PMH of aneurysmal atrial septum, mild aortic regurgitation, HLD, HTN, breast cancer, arthritis, and other comorbidities, who is here for cardiac preop risk stratification for knee surgery.  Patient denies a history of stroke, TIA, heart attack, heart failure, insulin use or CKD.  Patient is active, she climbs 15 step stairs with no CHRISTIAN or chest pain.  She denies any orthopnea, PND, dizziness or palpitation.  No family premature CAD or heart attack.  She never smoked.    Patient follows up with [facility] to monitor for cardiotoxic chemotherapy.    Patient is on rosuvastatin 20 mg once daily, and other noncardiac meds.  Today's blood pressure is 102/68, heart rate 79.    Blood work from 6/18/2025 with creatinine of 1.38, hemoglobin 11.7 with an MCV of 100.    EKG from 6/18/1975, reviewed, showed NSR, normal EKG.    TTE on 4/17/2025  1. Left ventricular ejection fraction is low normal, calculated by 3D at 55%.   2. Left Ventricular Global Longitudinal Strain - 19.0 %.   3. Mild aortic valve regurgitation.    Review of Systems  ROS is negative other than in HPI.      Objective   Physical Exam  General: NAD  HEENT: IEOM, PERRL   Neck: No JVD or carotid bruit  Lungs: CTAB  Heart: RRR, normal S1 and S2, no loud murmurs  Abdomen: Soft, nontender, positive bowel sounds  Extremities: No edema  Neurologic: No FND  Psychiatric: Normal mood and affect    Assessment/Plan   1-cardiac preop risk stratification for knee surgery.  -Patient has no RCRI risk factors other than her recent elevated creatinine which is likely due to her chemotherapy.  She has good level of functional capacity with no cardiovascular symptoms.  -Her most recent EKG and echocardiogram are unremarkable other than known mild AI.  -Patient is at acceptable/low cardiac risk for upcoming knee surgery.  No further cardiac testing is indicated.    2-Mild aortic regurgitation:  -Echocardiogram every 3 to 5  years or earlier if cardiac symptoms.  -Cardiac care as per [facility].    RTC as needed.    Yu Marsh MD

## 2025-07-01 ENCOUNTER — ANESTHESIA EVENT (OUTPATIENT)
Dept: OPERATING ROOM | Facility: HOSPITAL | Age: 70
End: 2025-07-01
Payer: COMMERCIAL

## 2025-07-02 ENCOUNTER — PROCEDURE VISIT (OUTPATIENT)
Facility: CLINIC | Age: 70
End: 2025-07-02
Payer: COMMERCIAL

## 2025-07-02 ENCOUNTER — HOSPITAL ENCOUNTER (OUTPATIENT)
Facility: HOSPITAL | Age: 70
Setting detail: OUTPATIENT SURGERY
Discharge: HOME HEALTH | End: 2025-07-02
Attending: STUDENT IN AN ORGANIZED HEALTH CARE EDUCATION/TRAINING PROGRAM | Admitting: STUDENT IN AN ORGANIZED HEALTH CARE EDUCATION/TRAINING PROGRAM
Payer: COMMERCIAL

## 2025-07-02 ENCOUNTER — APPOINTMENT (OUTPATIENT)
Dept: RADIOLOGY | Facility: HOSPITAL | Age: 70
End: 2025-07-02
Payer: COMMERCIAL

## 2025-07-02 ENCOUNTER — DOCUMENTATION (OUTPATIENT)
Dept: HOME HEALTH SERVICES | Facility: HOME HEALTH | Age: 70
End: 2025-07-02
Payer: COMMERCIAL

## 2025-07-02 ENCOUNTER — PHARMACY VISIT (OUTPATIENT)
Dept: PHARMACY | Facility: CLINIC | Age: 70
End: 2025-07-02
Payer: COMMERCIAL

## 2025-07-02 ENCOUNTER — ANESTHESIA (OUTPATIENT)
Dept: OPERATING ROOM | Facility: HOSPITAL | Age: 70
End: 2025-07-02
Payer: COMMERCIAL

## 2025-07-02 VITALS
TEMPERATURE: 97.3 F | DIASTOLIC BLOOD PRESSURE: 64 MMHG | BODY MASS INDEX: 31.18 KG/M2 | SYSTOLIC BLOOD PRESSURE: 102 MMHG | HEIGHT: 66 IN | OXYGEN SATURATION: 96 % | HEART RATE: 86 BPM | WEIGHT: 194 LBS | RESPIRATION RATE: 16 BRPM

## 2025-07-02 DIAGNOSIS — Z96.651 HISTORY OF TOTAL KNEE ARTHROPLASTY, RIGHT: Primary | ICD-10-CM

## 2025-07-02 DIAGNOSIS — M17.11 ARTHRITIS OF RIGHT KNEE: ICD-10-CM

## 2025-07-02 PROCEDURE — 27447 TOTAL KNEE ARTHROPLASTY: CPT | Performed by: STUDENT IN AN ORGANIZED HEALTH CARE EDUCATION/TRAINING PROGRAM

## 2025-07-02 PROCEDURE — 97530 THERAPEUTIC ACTIVITIES: CPT | Mod: GP

## 2025-07-02 PROCEDURE — 2500000004 HC RX 250 GENERAL PHARMACY W/ HCPCS (ALT 636 FOR OP/ED): Performed by: STUDENT IN AN ORGANIZED HEALTH CARE EDUCATION/TRAINING PROGRAM

## 2025-07-02 PROCEDURE — 7100000010 HC PHASE TWO TIME - EACH INCREMENTAL 1 MINUTE: Performed by: STUDENT IN AN ORGANIZED HEALTH CARE EDUCATION/TRAINING PROGRAM

## 2025-07-02 PROCEDURE — 2780000003 HC OR 278 NO HCPCS: Performed by: STUDENT IN AN ORGANIZED HEALTH CARE EDUCATION/TRAINING PROGRAM

## 2025-07-02 PROCEDURE — C1776 JOINT DEVICE (IMPLANTABLE): HCPCS | Performed by: STUDENT IN AN ORGANIZED HEALTH CARE EDUCATION/TRAINING PROGRAM

## 2025-07-02 PROCEDURE — 7100000002 HC RECOVERY ROOM TIME - EACH INCREMENTAL 1 MINUTE: Performed by: STUDENT IN AN ORGANIZED HEALTH CARE EDUCATION/TRAINING PROGRAM

## 2025-07-02 PROCEDURE — 73560 X-RAY EXAM OF KNEE 1 OR 2: CPT | Mod: RIGHT SIDE | Performed by: RADIOLOGY

## 2025-07-02 PROCEDURE — 64447 NJX AA&/STRD FEMORAL NRV IMG: CPT | Performed by: ANESTHESIOLOGY

## 2025-07-02 PROCEDURE — A27447 PR TOTAL KNEE ARTHROPLASTY: Performed by: ANESTHESIOLOGY

## 2025-07-02 PROCEDURE — 3700000002 HC GENERAL ANESTHESIA TIME - EACH INCREMENTAL 1 MINUTE: Performed by: STUDENT IN AN ORGANIZED HEALTH CARE EDUCATION/TRAINING PROGRAM

## 2025-07-02 PROCEDURE — 3600000018 HC OR TIME - INITIAL BASE CHARGE - PROCEDURE LEVEL SIX: Performed by: STUDENT IN AN ORGANIZED HEALTH CARE EDUCATION/TRAINING PROGRAM

## 2025-07-02 PROCEDURE — 97161 PT EVAL LOW COMPLEX 20 MIN: CPT | Mod: GP

## 2025-07-02 PROCEDURE — 64473 LWR XTR FSCL PLN BLK UNI NJX: CPT | Performed by: ANESTHESIOLOGY

## 2025-07-02 PROCEDURE — 3600000017 HC OR TIME - EACH INCREMENTAL 1 MINUTE - PROCEDURE LEVEL SIX: Performed by: STUDENT IN AN ORGANIZED HEALTH CARE EDUCATION/TRAINING PROGRAM

## 2025-07-02 PROCEDURE — 2500000004 HC RX 250 GENERAL PHARMACY W/ HCPCS (ALT 636 FOR OP/ED): Performed by: ANESTHESIOLOGY

## 2025-07-02 PROCEDURE — 3700000001 HC GENERAL ANESTHESIA TIME - INITIAL BASE CHARGE: Performed by: STUDENT IN AN ORGANIZED HEALTH CARE EDUCATION/TRAINING PROGRAM

## 2025-07-02 PROCEDURE — 0055T BONE SRGRY CMPTR CT/MRI IMAG: CPT | Performed by: STUDENT IN AN ORGANIZED HEALTH CARE EDUCATION/TRAINING PROGRAM

## 2025-07-02 PROCEDURE — 2500000001 HC RX 250 WO HCPCS SELF ADMINISTERED DRUGS (ALT 637 FOR MEDICARE OP): Performed by: STUDENT IN AN ORGANIZED HEALTH CARE EDUCATION/TRAINING PROGRAM

## 2025-07-02 PROCEDURE — 2500000005 HC RX 250 GENERAL PHARMACY W/O HCPCS: Performed by: INTERNAL MEDICINE

## 2025-07-02 PROCEDURE — 2500000004 HC RX 250 GENERAL PHARMACY W/ HCPCS (ALT 636 FOR OP/ED)

## 2025-07-02 PROCEDURE — C1713 ANCHOR/SCREW BN/BN,TIS/BN: HCPCS | Performed by: STUDENT IN AN ORGANIZED HEALTH CARE EDUCATION/TRAINING PROGRAM

## 2025-07-02 PROCEDURE — 7100000001 HC RECOVERY ROOM TIME - INITIAL BASE CHARGE: Performed by: STUDENT IN AN ORGANIZED HEALTH CARE EDUCATION/TRAINING PROGRAM

## 2025-07-02 PROCEDURE — 2500000004 HC RX 250 GENERAL PHARMACY W/ HCPCS (ALT 636 FOR OP/ED): Mod: JW | Performed by: INTERNAL MEDICINE

## 2025-07-02 PROCEDURE — A4649 SURGICAL SUPPLIES: HCPCS | Performed by: STUDENT IN AN ORGANIZED HEALTH CARE EDUCATION/TRAINING PROGRAM

## 2025-07-02 PROCEDURE — 7100000009 HC PHASE TWO TIME - INITIAL BASE CHARGE: Performed by: STUDENT IN AN ORGANIZED HEALTH CARE EDUCATION/TRAINING PROGRAM

## 2025-07-02 PROCEDURE — 64473 LWR XTR FSCL PLN BLK UNI NJX: CPT | Mod: XP,RT

## 2025-07-02 PROCEDURE — 97116 GAIT TRAINING THERAPY: CPT | Mod: GP

## 2025-07-02 PROCEDURE — RXMED WILLOW AMBULATORY MEDICATION CHARGE

## 2025-07-02 PROCEDURE — 73560 X-RAY EXAM OF KNEE 1 OR 2: CPT | Mod: RT

## 2025-07-02 PROCEDURE — 97110 THERAPEUTIC EXERCISES: CPT | Mod: GP

## 2025-07-02 PROCEDURE — A27447 PR TOTAL KNEE ARTHROPLASTY: Performed by: INTERNAL MEDICINE

## 2025-07-02 PROCEDURE — 2720000007 HC OR 272 NO HCPCS: Performed by: STUDENT IN AN ORGANIZED HEALTH CARE EDUCATION/TRAINING PROGRAM

## 2025-07-02 DEVICE — FEM COMP, TRIATH CR SZ4 RIGHT: Type: IMPLANTABLE DEVICE | Site: KNEE | Status: FUNCTIONAL

## 2025-07-02 DEVICE — PATELLA, TRIATHLON, ASYMMETRIC X3, SZ-A29 9MM: Type: IMPLANTABLE DEVICE | Site: KNEE | Status: FUNCTIONAL

## 2025-07-02 DEVICE — CEMENT, BONE, SIMPLEX HV FULL DOSE W/GENTAMYCIN 40 GM: Type: IMPLANTABLE DEVICE | Site: KNEE | Status: FUNCTIONAL

## 2025-07-02 DEVICE — INSERT, TIBIAL, X3 TRIATHLON CS, SZ-3 9MM: Type: IMPLANTABLE DEVICE | Site: KNEE | Status: FUNCTIONAL

## 2025-07-02 DEVICE — BASEPLATE, TIBIA 3 TS TRIATH: Type: IMPLANTABLE DEVICE | Site: KNEE | Status: FUNCTIONAL

## 2025-07-02 RX ORDER — NORETHINDRONE AND ETHINYL ESTRADIOL 0.5-0.035
KIT ORAL AS NEEDED
Status: DISCONTINUED | OUTPATIENT
Start: 2025-07-02 | End: 2025-07-02

## 2025-07-02 RX ORDER — BISACODYL 5 MG
10 TABLET, DELAYED RELEASE (ENTERIC COATED) ORAL DAILY PRN
Status: DISCONTINUED | OUTPATIENT
Start: 2025-07-02 | End: 2025-07-03 | Stop reason: HOSPADM

## 2025-07-02 RX ORDER — HYDRALAZINE HYDROCHLORIDE 20 MG/ML
5 INJECTION INTRAMUSCULAR; INTRAVENOUS EVERY 30 MIN PRN
Status: DISCONTINUED | OUTPATIENT
Start: 2025-07-02 | End: 2025-07-03 | Stop reason: HOSPADM

## 2025-07-02 RX ORDER — ACETAMINOPHEN 325 MG/1
975 TABLET ORAL ONCE
Status: COMPLETED | OUTPATIENT
Start: 2025-07-02 | End: 2025-07-02

## 2025-07-02 RX ORDER — METOCLOPRAMIDE HYDROCHLORIDE 5 MG/ML
10 INJECTION INTRAMUSCULAR; INTRAVENOUS EVERY 6 HOURS PRN
Status: DISCONTINUED | OUTPATIENT
Start: 2025-07-02 | End: 2025-07-03 | Stop reason: HOSPADM

## 2025-07-02 RX ORDER — OXYCODONE HYDROCHLORIDE 5 MG/1
5 TABLET ORAL EVERY 6 HOURS PRN
Status: DISCONTINUED | OUTPATIENT
Start: 2025-07-02 | End: 2025-07-03 | Stop reason: HOSPADM

## 2025-07-02 RX ORDER — PREGABALIN 75 MG/1
75 CAPSULE ORAL ONCE
Status: COMPLETED | OUTPATIENT
Start: 2025-07-02 | End: 2025-07-02

## 2025-07-02 RX ORDER — ONDANSETRON 4 MG/1
4 TABLET, ORALLY DISINTEGRATING ORAL EVERY 8 HOURS PRN
Status: DISCONTINUED | OUTPATIENT
Start: 2025-07-02 | End: 2025-07-03 | Stop reason: HOSPADM

## 2025-07-02 RX ORDER — ONDANSETRON HYDROCHLORIDE 2 MG/ML
4 INJECTION, SOLUTION INTRAVENOUS ONCE AS NEEDED
Status: DISCONTINUED | OUTPATIENT
Start: 2025-07-02 | End: 2025-07-03 | Stop reason: HOSPADM

## 2025-07-02 RX ORDER — DOCUSATE SODIUM 100 MG/1
100 CAPSULE, LIQUID FILLED ORAL 2 TIMES DAILY
Status: DISCONTINUED | OUTPATIENT
Start: 2025-07-02 | End: 2025-07-03 | Stop reason: HOSPADM

## 2025-07-02 RX ORDER — FENTANYL CITRATE 50 UG/ML
50 INJECTION, SOLUTION INTRAMUSCULAR; INTRAVENOUS ONCE
Status: COMPLETED | OUTPATIENT
Start: 2025-07-02 | End: 2025-07-02

## 2025-07-02 RX ORDER — POLYETHYLENE GLYCOL 3350 17 G/17G
17 POWDER, FOR SOLUTION ORAL DAILY
Status: DISCONTINUED | OUTPATIENT
Start: 2025-07-03 | End: 2025-07-03 | Stop reason: HOSPADM

## 2025-07-02 RX ORDER — MEPERIDINE HYDROCHLORIDE 25 MG/ML
12.5 INJECTION INTRAMUSCULAR; INTRAVENOUS; SUBCUTANEOUS EVERY 10 MIN PRN
Status: DISCONTINUED | OUTPATIENT
Start: 2025-07-02 | End: 2025-07-03 | Stop reason: HOSPADM

## 2025-07-02 RX ORDER — LIDOCAINE 560 MG/1
1 PATCH PERCUTANEOUS; TOPICAL; TRANSDERMAL DAILY
Status: DISCONTINUED | OUTPATIENT
Start: 2025-07-03 | End: 2025-07-03 | Stop reason: HOSPADM

## 2025-07-02 RX ORDER — MIDAZOLAM HYDROCHLORIDE 1 MG/ML
2 INJECTION, SOLUTION INTRAMUSCULAR; INTRAVENOUS ONCE
Status: COMPLETED | OUTPATIENT
Start: 2025-07-02 | End: 2025-07-02

## 2025-07-02 RX ORDER — DEXAMETHASONE SODIUM PHOSPHATE 10 MG/ML
10 INJECTION INTRAMUSCULAR; INTRAVENOUS ONCE
Status: DISCONTINUED | OUTPATIENT
Start: 2025-07-02 | End: 2025-07-03 | Stop reason: HOSPADM

## 2025-07-02 RX ORDER — TRANEXAMIC ACID 650 MG/1
1950 TABLET ORAL ONCE
Status: DISCONTINUED | OUTPATIENT
Start: 2025-07-02 | End: 2025-07-03 | Stop reason: HOSPADM

## 2025-07-02 RX ORDER — SCOPOLAMINE 1 MG/3D
1 PATCH, EXTENDED RELEASE TRANSDERMAL
Status: DISCONTINUED | OUTPATIENT
Start: 2025-07-02 | End: 2025-07-03 | Stop reason: HOSPADM

## 2025-07-02 RX ORDER — CEFAZOLIN SODIUM 2 G/100ML
2 INJECTION, SOLUTION INTRAVENOUS ONCE
Status: COMPLETED | OUTPATIENT
Start: 2025-07-02 | End: 2025-07-02

## 2025-07-02 RX ORDER — HEPARIN 100 UNIT/ML
5 SYRINGE INTRAVENOUS ONCE
Status: DISCONTINUED | OUTPATIENT
Start: 2025-07-02 | End: 2025-07-03 | Stop reason: HOSPADM

## 2025-07-02 RX ORDER — DEXAMETHASONE SODIUM PHOSPHATE 10 MG/ML
5 INJECTION INTRAMUSCULAR; INTRAVENOUS ONCE
Status: DISCONTINUED | OUTPATIENT
Start: 2025-07-02 | End: 2025-07-03 | Stop reason: HOSPADM

## 2025-07-02 RX ORDER — PROPOFOL 10 MG/ML
INJECTION, EMULSION INTRAVENOUS AS NEEDED
Status: DISCONTINUED | OUTPATIENT
Start: 2025-07-02 | End: 2025-07-02

## 2025-07-02 RX ORDER — CEFAZOLIN SODIUM 2 G/100ML
2 INJECTION, SOLUTION INTRAVENOUS EVERY 8 HOURS
Status: DISCONTINUED | OUTPATIENT
Start: 2025-07-02 | End: 2025-07-03 | Stop reason: HOSPADM

## 2025-07-02 RX ORDER — CELECOXIB 200 MG/1
200 CAPSULE ORAL ONCE
Status: COMPLETED | OUTPATIENT
Start: 2025-07-02 | End: 2025-07-02

## 2025-07-02 RX ORDER — METOCLOPRAMIDE 10 MG/1
10 TABLET ORAL EVERY 6 HOURS PRN
Status: DISCONTINUED | OUTPATIENT
Start: 2025-07-02 | End: 2025-07-03 | Stop reason: HOSPADM

## 2025-07-02 RX ORDER — ANASTROZOLE 1 MG/1
1 TABLET ORAL DAILY
Status: DISCONTINUED | OUTPATIENT
Start: 2025-07-02 | End: 2025-07-03 | Stop reason: HOSPADM

## 2025-07-02 RX ORDER — ONDANSETRON HYDROCHLORIDE 2 MG/ML
INJECTION, SOLUTION INTRAVENOUS AS NEEDED
Status: DISCONTINUED | OUTPATIENT
Start: 2025-07-02 | End: 2025-07-02

## 2025-07-02 RX ORDER — FENTANYL CITRATE 50 UG/ML
50 INJECTION, SOLUTION INTRAMUSCULAR; INTRAVENOUS EVERY 5 MIN PRN
Status: DISCONTINUED | OUTPATIENT
Start: 2025-07-02 | End: 2025-07-03 | Stop reason: HOSPADM

## 2025-07-02 RX ORDER — NALOXONE HYDROCHLORIDE 0.4 MG/ML
0.2 INJECTION, SOLUTION INTRAMUSCULAR; INTRAVENOUS; SUBCUTANEOUS EVERY 5 MIN PRN
Status: DISCONTINUED | OUTPATIENT
Start: 2025-07-02 | End: 2025-07-03 | Stop reason: HOSPADM

## 2025-07-02 RX ORDER — ONDANSETRON HYDROCHLORIDE 2 MG/ML
4 INJECTION, SOLUTION INTRAVENOUS EVERY 8 HOURS PRN
Status: DISCONTINUED | OUTPATIENT
Start: 2025-07-02 | End: 2025-07-03 | Stop reason: HOSPADM

## 2025-07-02 RX ORDER — KETOROLAC TROMETHAMINE 15 MG/ML
15 INJECTION, SOLUTION INTRAMUSCULAR; INTRAVENOUS EVERY 6 HOURS
Status: DISCONTINUED | OUTPATIENT
Start: 2025-07-02 | End: 2025-07-03 | Stop reason: HOSPADM

## 2025-07-02 RX ORDER — OXYBUTYNIN CHLORIDE 5 MG/1
5 TABLET ORAL 3 TIMES DAILY
Status: DISCONTINUED | OUTPATIENT
Start: 2025-07-02 | End: 2025-07-03 | Stop reason: HOSPADM

## 2025-07-02 RX ORDER — ROPIVACAINE/EPI/CLONIDINE/KET 2.46-0.005
SYRINGE (ML) INJECTION AS NEEDED
Status: DISCONTINUED | OUTPATIENT
Start: 2025-07-02 | End: 2025-07-02 | Stop reason: HOSPADM

## 2025-07-02 RX ORDER — LABETALOL HYDROCHLORIDE 5 MG/ML
5 INJECTION, SOLUTION INTRAVENOUS ONCE AS NEEDED
Status: DISCONTINUED | OUTPATIENT
Start: 2025-07-02 | End: 2025-07-03 | Stop reason: HOSPADM

## 2025-07-02 RX ORDER — OXYCODONE HYDROCHLORIDE 5 MG/1
10 TABLET ORAL EVERY 4 HOURS PRN
Status: DISCONTINUED | OUTPATIENT
Start: 2025-07-02 | End: 2025-07-03 | Stop reason: HOSPADM

## 2025-07-02 RX ORDER — METOCLOPRAMIDE HYDROCHLORIDE 5 MG/ML
10 INJECTION INTRAMUSCULAR; INTRAVENOUS ONCE
Status: DISCONTINUED | OUTPATIENT
Start: 2025-07-02 | End: 2025-07-03 | Stop reason: HOSPADM

## 2025-07-02 RX ORDER — PANTOPRAZOLE SODIUM 40 MG/1
40 TABLET, DELAYED RELEASE ORAL
Status: DISCONTINUED | OUTPATIENT
Start: 2025-07-03 | End: 2025-07-03 | Stop reason: HOSPADM

## 2025-07-02 RX ORDER — ROPIVACAINE HYDROCHLORIDE 5 MG/ML
INJECTION, SOLUTION EPIDURAL; INFILTRATION; PERINEURAL
Status: COMPLETED | OUTPATIENT
Start: 2025-07-02 | End: 2025-07-02

## 2025-07-02 RX ORDER — CYCLOBENZAPRINE HCL 10 MG
5 TABLET ORAL 3 TIMES DAILY PRN
Status: DISCONTINUED | OUTPATIENT
Start: 2025-07-02 | End: 2025-07-03 | Stop reason: HOSPADM

## 2025-07-02 RX ORDER — PANTOPRAZOLE SODIUM 40 MG/1
40 TABLET, DELAYED RELEASE ORAL
Status: DISCONTINUED | OUTPATIENT
Start: 2025-07-03 | End: 2025-07-02

## 2025-07-02 RX ORDER — SODIUM CHLORIDE, SODIUM LACTATE, POTASSIUM CHLORIDE, CALCIUM CHLORIDE 600; 310; 30; 20 MG/100ML; MG/100ML; MG/100ML; MG/100ML
100 INJECTION, SOLUTION INTRAVENOUS CONTINUOUS
Status: DISCONTINUED | OUTPATIENT
Start: 2025-07-02 | End: 2025-07-03 | Stop reason: HOSPADM

## 2025-07-02 RX ORDER — SODIUM CHLORIDE, SODIUM LACTATE, POTASSIUM CHLORIDE, CALCIUM CHLORIDE 600; 310; 30; 20 MG/100ML; MG/100ML; MG/100ML; MG/100ML
50 INJECTION, SOLUTION INTRAVENOUS CONTINUOUS
Status: DISCONTINUED | OUTPATIENT
Start: 2025-07-02 | End: 2025-07-03 | Stop reason: HOSPADM

## 2025-07-02 RX ORDER — ESCITALOPRAM OXALATE 10 MG/1
10 TABLET ORAL DAILY
Status: DISCONTINUED | OUTPATIENT
Start: 2025-07-03 | End: 2025-07-03 | Stop reason: HOSPADM

## 2025-07-02 RX ORDER — PHENYLEPHRINE HCL IN 0.9% NACL 1 MG/10 ML
SYRINGE (ML) INTRAVENOUS AS NEEDED
Status: DISCONTINUED | OUTPATIENT
Start: 2025-07-02 | End: 2025-07-02

## 2025-07-02 RX ORDER — FAMOTIDINE 10 MG/ML
20 INJECTION, SOLUTION INTRAVENOUS ONCE
Status: DISCONTINUED | OUTPATIENT
Start: 2025-07-02 | End: 2025-07-03 | Stop reason: HOSPADM

## 2025-07-02 RX ORDER — SODIUM CHLORIDE, SODIUM LACTATE, POTASSIUM CHLORIDE, CALCIUM CHLORIDE 600; 310; 30; 20 MG/100ML; MG/100ML; MG/100ML; MG/100ML
75 INJECTION, SOLUTION INTRAVENOUS CONTINUOUS
Status: ACTIVE | OUTPATIENT
Start: 2025-07-02 | End: 2025-07-02

## 2025-07-02 RX ORDER — ALBUTEROL SULFATE 0.83 MG/ML
2.5 SOLUTION RESPIRATORY (INHALATION) ONCE AS NEEDED
Status: DISCONTINUED | OUTPATIENT
Start: 2025-07-02 | End: 2025-07-03 | Stop reason: HOSPADM

## 2025-07-02 RX ORDER — TRANEXAMIC ACID 650 MG/1
1950 TABLET ORAL ONCE
Status: DISCONTINUED | OUTPATIENT
Start: 2025-07-03 | End: 2025-07-03 | Stop reason: HOSPADM

## 2025-07-02 RX ORDER — ATORVASTATIN CALCIUM 20 MG/1
40 TABLET, FILM COATED ORAL DAILY
Status: DISCONTINUED | OUTPATIENT
Start: 2025-07-03 | End: 2025-07-03 | Stop reason: HOSPADM

## 2025-07-02 RX ORDER — ACETAMINOPHEN 325 MG/1
650 TABLET ORAL EVERY 4 HOURS PRN
Status: DISCONTINUED | OUTPATIENT
Start: 2025-07-02 | End: 2025-07-03 | Stop reason: HOSPADM

## 2025-07-02 RX ORDER — CEFADROXIL 500 MG/1
500 CAPSULE ORAL 2 TIMES DAILY
Status: DISCONTINUED | OUTPATIENT
Start: 2025-07-03 | End: 2025-07-03 | Stop reason: HOSPADM

## 2025-07-02 RX ORDER — TOPIRAMATE 25 MG/1
25 TABLET, FILM COATED ORAL DAILY
Status: DISCONTINUED | OUTPATIENT
Start: 2025-07-03 | End: 2025-07-03 | Stop reason: HOSPADM

## 2025-07-02 RX ORDER — BISACODYL 10 MG/1
10 SUPPOSITORY RECTAL DAILY PRN
Status: DISCONTINUED | OUTPATIENT
Start: 2025-07-02 | End: 2025-07-03 | Stop reason: HOSPADM

## 2025-07-02 RX ORDER — SODIUM CHLORIDE, SODIUM LACTATE, POTASSIUM CHLORIDE, CALCIUM CHLORIDE 600; 310; 30; 20 MG/100ML; MG/100ML; MG/100ML; MG/100ML
125 INJECTION, SOLUTION INTRAVENOUS CONTINUOUS
Status: DISCONTINUED | OUTPATIENT
Start: 2025-07-02 | End: 2025-07-03 | Stop reason: HOSPADM

## 2025-07-02 RX ORDER — TRANEXAMIC ACID 1 G/10ML
INJECTION, SOLUTION INTRAVENOUS AS NEEDED
Status: DISCONTINUED | OUTPATIENT
Start: 2025-07-02 | End: 2025-07-02

## 2025-07-02 RX ORDER — PREGABALIN 75 MG/1
75 CAPSULE ORAL 2 TIMES DAILY
Status: DISCONTINUED | OUTPATIENT
Start: 2025-07-02 | End: 2025-07-03 | Stop reason: HOSPADM

## 2025-07-02 RX ADMIN — EPHEDRINE SULFATE 15 MG: 50 INJECTION, SOLUTION INTRAVENOUS at 11:16

## 2025-07-02 RX ADMIN — TRANEXAMIC ACID 1000 MG: 100 INJECTION, SOLUTION INTRAVENOUS at 11:11

## 2025-07-02 RX ADMIN — TRANEXAMIC ACID 1000 MG: 100 INJECTION, SOLUTION INTRAVENOUS at 12:35

## 2025-07-02 RX ADMIN — PREGABALIN 75 MG: 75 CAPSULE ORAL at 09:26

## 2025-07-02 RX ADMIN — CEFAZOLIN SODIUM 2 G: 2 INJECTION, SOLUTION INTRAVENOUS at 11:11

## 2025-07-02 RX ADMIN — NORETHINDRONE AND ETHINYL ESTRADIOL 35 MG: KIT ORAL at 11:15

## 2025-07-02 RX ADMIN — FENTANYL CITRATE 50 MCG: 0.05 INJECTION, SOLUTION INTRAMUSCULAR; INTRAVENOUS at 11:05

## 2025-07-02 RX ADMIN — ONDANSETRON 4 MG: 2 INJECTION, SOLUTION INTRAMUSCULAR; INTRAVENOUS at 11:11

## 2025-07-02 RX ADMIN — Medication 100 MCG: at 12:49

## 2025-07-02 RX ADMIN — FENTANYL CITRATE 50 MCG: 50 INJECTION, SOLUTION INTRAMUSCULAR; INTRAVENOUS at 10:04

## 2025-07-02 RX ADMIN — DEXAMETHASONE SODIUM PHOSPHATE 8 MG: 4 INJECTION, SOLUTION INTRAMUSCULAR; INTRAVENOUS at 11:11

## 2025-07-02 RX ADMIN — FENTANYL CITRATE 50 MCG: 0.05 INJECTION, SOLUTION INTRAMUSCULAR; INTRAVENOUS at 11:08

## 2025-07-02 RX ADMIN — Medication 200 MCG: at 11:31

## 2025-07-02 RX ADMIN — CELECOXIB 200 MG: 200 CAPSULE ORAL at 09:27

## 2025-07-02 RX ADMIN — SCOPOLAMINE 1 PATCH: 1.5 PATCH, EXTENDED RELEASE TRANSDERMAL at 09:27

## 2025-07-02 RX ADMIN — MIDAZOLAM 2 MG: 1 INJECTION INTRAMUSCULAR; INTRAVENOUS at 11:05

## 2025-07-02 RX ADMIN — Medication 50 MG: at 11:10

## 2025-07-02 RX ADMIN — MIDAZOLAM 2 MG: 1 INJECTION INTRAMUSCULAR; INTRAVENOUS at 10:03

## 2025-07-02 RX ADMIN — Medication 200 MCG: at 12:05

## 2025-07-02 RX ADMIN — ACETAMINOPHEN 975 MG: 325 TABLET ORAL at 09:26

## 2025-07-02 RX ADMIN — ROPIVACAINE HYDROCHLORIDE 20 ML: 5 INJECTION, SOLUTION EPIDURAL; INFILTRATION; PERINEURAL at 10:07

## 2025-07-02 RX ADMIN — Medication 100 MCG: at 12:31

## 2025-07-02 RX ADMIN — PROPOFOL 500 MG: 10 INJECTION, EMULSION INTRAVENOUS at 11:05

## 2025-07-02 RX ADMIN — SODIUM CHLORIDE, POTASSIUM CHLORIDE, SODIUM LACTATE AND CALCIUM CHLORIDE: 600; 310; 30; 20 INJECTION, SOLUTION INTRAVENOUS at 10:50

## 2025-07-02 RX ADMIN — ROPIVACAINE HYDROCHLORIDE 20 ML: 5 INJECTION, SOLUTION EPIDURAL; INFILTRATION; PERINEURAL at 11:31

## 2025-07-02 RX ADMIN — Medication 100 MCG: at 11:15

## 2025-07-02 SDOH — HEALTH STABILITY: MENTAL HEALTH: CURRENT SMOKER: 0

## 2025-07-02 ASSESSMENT — COGNITIVE AND FUNCTIONAL STATUS - GENERAL: MOBILITY SCORE: 24

## 2025-07-02 ASSESSMENT — PAIN SCALES - GENERAL
PAIN_LEVEL: 0
PAINLEVEL_OUTOF10: 0 - NO PAIN
PAINLEVEL_OUTOF10: 8
PAINLEVEL_OUTOF10: 0 - NO PAIN

## 2025-07-02 ASSESSMENT — PAIN DESCRIPTION - DESCRIPTORS
DESCRIPTORS: DULL
DESCRIPTORS: DISCOMFORT
DESCRIPTORS: BURNING;SHARP
DESCRIPTORS: DULL
DESCRIPTORS: DULL

## 2025-07-02 ASSESSMENT — PAIN - FUNCTIONAL ASSESSMENT
PAIN_FUNCTIONAL_ASSESSMENT: 0-10

## 2025-07-02 ASSESSMENT — ACTIVITIES OF DAILY LIVING (ADL)
ADL_ASSISTANCE: INDEPENDENT
ADLS_ADDRESSED: YES
LACK_OF_TRANSPORTATION: NO

## 2025-07-02 NOTE — ANESTHESIA PROCEDURE NOTES
Peripheral Block    Patient location during procedure: pre-op  Medication administered at: 7/2/2025 10:06 AM  End time: 7/2/2025 10:07 AM  Reason for block: at surgeon's request and post-op pain management  Staffing  Performed: ILIA   Authorized by: Angelo Banuelos MD    Performed by: Vineet Humphrey MD  Preanesthetic Checklist  Completed: patient identified, IV checked, site marked, risks and benefits discussed, surgical consent, monitors and equipment checked, pre-op evaluation and timeout performed   Timeout performed at: 7/2/2025 10:03 AM  Peripheral Block  Patient position: laying flat  Prep: ChloraPrep  Patient monitoring: heart rate, cardiac monitor and continuous pulse ox  Block type: IPACK  Laterality: right  Injection technique: single-shot  Guidance: ultrasound guided  Needle  Needle gauge: 21 G  Needle length: 10 cm  Needle localization: ultrasound guidance  Test dose: negative  Assessment  Injection assessment: negative aspiration for heme, no paresthesia on injection, incremental injection and local visualized surrounding nerve on ultrasound  Paresthesia pain: none  Heart rate change: no  Slow fractionated injection: yes  Medications Administered  dexAMETHasone (Decadron) injection - perineural injection   5 mg - 7/2/2025 11:31:00 AM  ropivacaine (NAROPIN) 5 MG/ML Perineural - perineural injection   20 mL - 7/2/2025 11:31:00 AM

## 2025-07-02 NOTE — PROGRESS NOTES
Met with Patient and Care Partner at bedside- Patient is s/p Right Total Knee Replacement with Dr. Yusuf Nevarez.  Discussion with patient included education on the following topics: [facility] Education: Wound Care (Bandage Care & Removal, Personal Hygiene & Infection Prevention), Post-Op Activity (Home PT Regimen, Movement Precautions, Assistive Equipment & 1-2hr Movement), Post-Op Precautions (Falls, Blood Clots & Constipation), Cold-Therapy (Ice vs. Cold Therapy Machines) , Methods for Symptom Management (Pain, Nausea, Swelling & Constipation), Importance of Post-op Prescriptions, How to Obtain Medication Refills, When to Resume Driving & Who to Call, Use of [employer] & Staff Contact Information, When to call 9-1-1, and When to call the Surgeon's Office.  Patient Did Complete and Live class prior to surgery.  Patient is able to verbalize understanding of class content/post-operative discussion.  Contact information was provided to patient for support and assistance during the post-operative period.    At the time of this discussion, the patient's plan includes:    Discharge Date/Disposition:  Home, Today with, Home Care Services  Discharge Needs: No Equipment Needs Identified  Medications/Pharmacy: [id number] service utilized for discharge prescriptions through [facility]

## 2025-07-02 NOTE — PROGRESS NOTES
07/02/25 1540   Discharge Planning   Living Arrangements Spouse/significant other   Support Systems Spouse/significant other   Assistance Needed independent in adls and mobility; pt has walker and ice machine for discharge.   Type of Residence Private residence   Number of Stairs to Enter Residence 3   Number of Stairs Within Residence 15   Do you have animals or pets at home? Yes   Who is requesting discharge planning? Provider   Home or Post Acute Services In home services   Type of Home Care Services Home PT   Expected Discharge Disposition Home H  ( homecare soc confirmed for tomorrow)   Does the patient need discharge transport arranged? No   Financial Resource Strain   How hard is it for you to pay for the very basics like food, housing, medical care, and heating? Not very   Housing Stability   In the last 12 months, was there a time when you were not able to pay the mortgage or rent on time? N   In the past 12 months, how many times have you moved where you were living? 0   At any time in the past 12 months, were you homeless or living in a shelter (including now)? N   Transportation Needs   In the past 12 months, has lack of transportation kept you from medical appointments or from getting medications? no   In the past 12 months, has lack of transportation kept you from meetings, work, or from getting things needed for daily living? No   Patient Choice   Patient / Family choosing to utilize agency / facility established prior to hospitalization No     CM met with patient and spouse at bedside. Facesheet details reviewed. Scripts will come to bedside.   Pt choiced and is amenable to  home care for PT services.     Dispo:  home care   MERCEDES today  Start of care confirmed for tomorrow. CM has given  homecare contact information to the patients spouse.     Cleared for DC per TCC

## 2025-07-02 NOTE — HH CARE COORDINATION
Home Care received a Referral for Physical Therapy and Occupational Therapy. We have processed the referral for a Start of Care on 07/3/2025.     If you have any questions or concerns, please feel free to contact us at [phone]. Follow the prompts, enter your five digit zip code, and you will be directed to your care team on [facility] 2.

## 2025-07-02 NOTE — ANESTHESIA PREPROCEDURE EVALUATION
Patient: Radha Shabazz    Procedure Information       Date/Time: 07/02/25 1020    Procedure: RESURFACING ARTHROPLASTY, KNEE, TOTAL (Alec,DION) ** Rapid Recovery ** (Right: Knee) - Case is ready to schedule.  Adductor canal black, (not femoral)    Location: [facility] 04 / [facility]    Surgeons: Yusuf Nevarez MD            Relevant Problems   Cardiac   (+) Benign essential hypertension      /Renal   (+) Nephrolithiasis   (+) Recurrent UTI      Endocrine   (+) Obese      Musculoskeletal   (+) Osteoarthritis of right knee      ID   (+) Recurrent UTI      Skin   (+) SCC (squamous cell carcinoma)      GYN   (+) Breast cancer of upper-inner quadrant of right female breast   (+) Malignant neoplasm of central portion of right breast in female, estrogen receptor positive       Clinical information reviewed:   Tobacco  Allergies  Meds   Med Hx  Surg Hx   Fam Hx  Soc Hx        NPO Detail:  NPO/Void Status  Date of Last Liquid: 07/01/25  Time of Last Liquid: 2330  Date of Last Solid: 07/01/25  Time of Last Solid: 1800  Last Intake Type: Light meal  Time of Last Void: 0855         Physical Exam    Airway  Mallampati: II  TM distance: >3 FB  Neck ROM: full  Mouth opening: 3 or more finger widths  Comments: Pt is s/p partial glossectomy secondary to tongue cancer.      Cardiovascular Comments: deferred   Dental    Pulmonary Comments: deferred   Abdominal   Comments: deferred         Anesthesia Plan    History of general anesthesia?: yes  History of complications of general anesthesia?: yes    ASA 3     spinal, MAC and regional   (H/O PONV  ACB plus IPACK plus spinal/MAC)  The patient is not a current smoker.  Education provided regarding risk of obstructive sleep apnea.  intravenous induction   Postoperative pain plan includes opioids.  Anesthetic plan and risks discussed with patient.    Plan discussed with CAA.

## 2025-07-02 NOTE — ANESTHESIA POSTPROCEDURE EVALUATION
Patient: Radha Shabazz    Procedure Summary       Date: 07/02/25 Room / Location: [facility] / [facility]    Anesthesia Start: 1053 Anesthesia Stop: 1329    Procedure: RESURFACING ARTHROPLASTY, KNEE, TOTAL ([place],DION) ** Rapid Recovery ** (Right: Knee) Diagnosis:       Arthritis of right knee      (Arthritis of right knee [M17.11])    Surgeons: Yusuf Nevarez MD Responsible Provider: Angelo Banuelos MD    Anesthesia Type: spinal, MAC, regional ASA Status: 3            Anesthesia Type: spinal, MAC, regional    Vitals Value Taken Time   /82 07/02/25 13:30   Temp 36 °C (96.8 °F) 07/02/25 13:30   Pulse 86 07/02/25 13:30   Resp 18 07/02/25 13:30   SpO2 96 % 07/02/25 13:30       Anesthesia Post Evaluation    Patient location during evaluation: PACU  Patient participation: complete - patient participated  Level of consciousness: awake and alert  Pain score: 0  Pain management: satisfactory to patient  Airway patency: patent  Two or more strategies used to mitigate risk of obstructive sleep apnea  Cardiovascular status: acceptable  Respiratory status: acceptable  Hydration status: acceptable  Postoperative Nausea and Vomiting: none  Comments: Spinal regressing expectantly. Block appears to be working well with no apparent post-anesthetic complication noted.           There were no known notable events for this encounter.

## 2025-07-02 NOTE — ANESTHESIA PROCEDURE NOTES
Peripheral Block    Patient location during procedure: pre-op  Medication administered at: 7/2/2025 10:07 AM  End time: 7/2/2025 10:08 AM  Reason for block: at surgeon's request and post-op pain management  Staffing  Performed: ILIA   Authorized by: Angelo Banuelos MD    Performed by: Vineet Humphrey MD  Preanesthetic Checklist  Completed: patient identified, IV checked, site marked, risks and benefits discussed, surgical consent, monitors and equipment checked, pre-op evaluation and timeout performed   Timeout performed at: 7/2/2025 10:03 AM  Peripheral Block  Patient position: laying flat  Prep: ChloraPrep  Patient monitoring: heart rate, cardiac monitor and continuous pulse ox  Block type: adductor canal  Laterality: right  Injection technique: single-shot  Guidance: ultrasound guided  Needle  Needle gauge: 21 G  Needle length: 10 cm  Needle localization: ultrasound guidance  Test dose: negative  Assessment  Injection assessment: negative aspiration for heme, no paresthesia on injection, incremental injection and local visualized surrounding nerve on ultrasound  Paresthesia pain: none  Heart rate change: no  Slow fractionated injection: yes  Additional Notes  Ropivicaine 20 ml 0.5 % with 5 mg Decadron  Medications Administered  dexAMETHasone (Decadron) injection - perineural injection   5 mg - 7/2/2025 10:07:00 AM  ropivacaine (NAROPIN) 5 MG/ML Perineural - perineural injection   20 mL - 7/2/2025 10:07:00 AM

## 2025-07-02 NOTE — PERIOPERATIVE NURSING NOTE
Patient in Phase 1; back to baseline and tolerating po fluids, no complaint of pain and no complaint of nausea.     Discussed next steps and patient has no questions at this time.     Patient clinically appropriate for discharge from PACU phase I, report given and patient transported via cart.

## 2025-07-02 NOTE — PROGRESS NOTES
Physical Therapy Evaluation & Treatment    Patient Name: Radha Shabazz  MRN: [id number]  Department: [facility] PACU  Room: [facility] OR  Today's Date: 7/2/2025   Time Calculation  Start Time: 1544  Stop Time: 1644  Time Calculation (min): 60 min    Assessment/Plan   PT Assessment  PT Assessment Results: Decreased strength, Decreased range of motion, Decreased mobility, Decreased coordination, Impaired sensation, Obesity, Orthopedic restrictions, Pain  Rehab Prognosis: Excellent  Barriers to Discharge Home: No anticipated barriers  Evaluation/Treatment Tolerance: Patient tolerated treatment well  Strengths: Ability to acquire knowledge, Access to adaptive/assistive products, Attitude of self, Capable of completing ADLs semi/independent, Insight into problems, Premorbid level of function, Support of Caregivers, Support and attitude of living partners  End of Session Communication: Bedside nurse  Assessment Comment: Pt low complexity eval presenting with impaired sensation, impaired coordination, and deficits to functional mobility following R TKA performed on 7/2/2025. Education regarding TKA precautions provided with handout issued; pt verbalized understanding. Therapeutic exercises performed; HEP handout provided. Pt able to participate in bed mobility, functional transfers, ambulation with walker, and stair negotiation at high functional level without knee buckle or LOB. Pt is very motivated for recovery and has supportive spouse at home upon DC. PT recommends DC home with HHPT and assist as needed.    End of Session Patient Position: Up in chair, Alarm off, caregiver present with RLE elevated and fully extended with ice to surgical site. Call light left in reach; patient instructed not to get up on own and verbalized understanding of this. RN aware.    IP OR SWING BED PT PLAN  Inpatient or Swing Bed: Inpatient  PT Plan  Treatment/Interventions: Bed mobility, Transfer training, Gait training, Stair training,  Strengthening, Range of motion, Therapeutic exercise, Therapeutic activity, Home exercise program, Positioning  PT Plan: Ongoing PT  PT Frequency: BID  PT Discharge Recommendations: Low intensity level of continued care  Equipment Recommended upon Discharge: Wheeled walker, Straight cane (straight cane fitted and vended to pt during session)  PT Recommended Transfer Status: Stand by assist, Assistive device  PT - OK to Discharge: Yes      Subjective     PT Visit Info:  PT Received On: 07/02/25  General Visit Information:  General  Reason for Referral: R TKA (7/2/2025)  Referred By: Dr. Nevarez  Past Medical History Relevant to Rehab: OA, anxiety, depression, obesity, breast CA s/p R mastectomy, SCC of tongue, GERD, HLD, IBS, HTN, PONV, appendectomy  Family/Caregiver Present: Yes (spouse)  Prior to Session Communication: Bedside nurse  Patient Position Received: On cart  General Comment: Session cleared by RN. Pt very pleasant, motivated, and agreeable to PT evaluation. Dressing to R knee dry & intact.     Home Living:  Home Living  Type of Home: House  Lives With: Spouse, Adult children  Home Adaptive Equipment: Walker rolling or standard, Other (Comment) (ice machine)  Home Layout: Two level, Stairs to alternate level with rails  Alternate Level Stairs-Rails: Right  Alternate Level Stairs-Number of Steps: 12+4  Home Access: Stairs to enter without rails  Entrance Stairs-Number of Steps: 1 + 1 curb WALTER  Bathroom Shower/Tub: Walk-in shower  Bathroom Equipment: Built-in shower seat  Prior Level of Function:  Prior Function Per Pt/Caregiver Report  Level of [place]: Independent with ADLs and functional transfers, Independent with homemaking with ambulation  ADL Assistance: Independent  Homemaking Assistance: Independent  Ambulatory Assistance: Independent  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  Precautions:  Precautions  LE Weight Bearing  Status: Weight Bearing as Tolerated  Medical Precautions: Fall precautions  Post-Surgical Precautions: Right total knee precautions           Objective   Pain:  Pain Assessment  Pain Assessment: 0-10  0-10 (Numeric) Pain Score: 0 - No pain  Pain Interventions: Cold applied, Ambulation/increased activity, Elevated  Response to Interventions: Content/relaxed  Cognition:  Cognition  Overall Cognitive Status: Within Functional Limits  Attention: Within Functional Limits  Memory: Within Funtional Limits  Problem Solving: Within Functional Limits  Numeric Reasoning: Within Functional Limits  Abstract Reasoning: Within Functional Limits  Safety/Judgement: Within Functional Limits  Insight: Within function limits  Impulsive: Within functional limits  Processing Speed: Within funtional limits    General Assessments:  Activity Tolerance  Endurance: Endurance does not limit participation in activity    Sensation  Light Touch: Partial deficits in the LLE, Partial deficits in the RLE (spinal at S2; B posterior LE and heels impaired to light touch with pt reporting glutes/perineal region numb)  Proprioception: No apparent deficits         Coordination  Movements are Fluid and Coordinated: No  Lower Body Coordination: impaired from post op limitations    Postural Control  Postural Control: Within Functional Limits    Static Sitting Balance  Static Sitting-Balance Support: Feet supported  Static Sitting-Level of Assistance: Independent  Dynamic Sitting Balance  Dynamic Sitting-Balance Support: Feet supported  Dynamic Sitting-Level of Assistance: Independent    Static Standing Balance  Static Standing-Balance Support: Bilateral upper extremity supported (with RW)  Static Standing-Level of Assistance: Distant supervision  Dynamic Standing Balance  Dynamic Standing-Balance Support: Bilateral upper extremity supported (with RW)  Dynamic Standing-Level of Assistance: Close supervision  Functional Assessments:  ADL  ADL's Addressed:  "Yes  Toileting Deficit: Grab bar use  Functional Assistance: Stand by  Functional Deficit: Toilet transfer, Verbal cueing, Supervision/safety (pt completed toilet transfer with L grab bar use. cues for hand placement, sequencing, body mechanicas. unable to void at this time, RN aware.)    Bed Mobility  Bed Mobility: Yes  Bed Mobility 1  Bed Mobility 1: Supine to sitting, Scooting  Level of Assistance 1: Independent    Transfers  Transfer: Yes  Transfer 1  Transfer From 1: Bed to, Stand to  Transfer to 1: Stand, Wheelchair, Toilet, Chair with arms  Technique 1: Sit to stand, Stand to sit  Transfer Device 1: Walker  Transfer Level of Assistance 1: Close supervision, Minimal verbal cues (cues for hand placement, sequencing, body mechanics)  Trials/Comments 1: x5    Ambulation/Gait Training  Ambulation/Gait Training Performed: Yes  Ambulation/Gait Training 1  Surface 1: Level tile  Device 1: Rolling walker  Assistance 1: Close supervision, Minimal verbal cues (cues for sequencing, body mechanics)  Quality of Gait 1: Decreased step length, Diminished heel strike, Antalgic (no knee buckle or LOB. decreased trudy, step to pattern.)  Comments/Distance (ft) 1: 40 feet x2    Stairs  Stairs: Yes  Stairs  Rails 1: Right  Device 1: Railing, Single point cane  Assistance 1: Close supervision, Minimal verbal cues (cues for sequencing, hand placement, body mechanics)  Comment/Number of Steps 1: pt negotiated three 6\" steps with R rail and L cane via step to pattern. no knee buckle or LOB  Stairs 2  Device 2: Single point cane  Assistance 2: Hand held assistance, Minimal verbal cues (cues for hand placement)  Comment/Number of Steps 2: pt negotiated two 6\" steps with L cane and R HHA via step to pattern. no knee buckle or LOB  Stairs 3  Curb Step 3: Yes  Device 3: Wheeled walker  Assistance 3: Close supervision, Minimal verbal cues (cues for hand placement)  Comment/Number of Stairs 3: pt ascended x1 curb step with rolling walker " via step to pattern. no knee buckle or LOB  Extremity/Trunk Assessments:  RUE   RUE : Within Functional Limits  LUE   LUE: Within Functional Limits  RLE   RLE : Exceptions to WFL  AROM RLE (degrees)  RLE AROM Comment: R knee AROM 0--90 degrees  Strength RLE  RLE Overall Strength: Greater than or equal to 3/5 as evidenced by functional mobility  LLE   LLE : Within Functional Limits  Treatments:  Therapeutic Exercise  Therapeutic Exercise Performed: Yes B ankle pumps, R quad sets, R gluteal sets, R heel slides, R SAQ, R hip abduction, and R SLR x 10 reps each.      Therapeutic Activity  Therapeutic Activity Performed: Yes; PT provided pt education regarding TKA precautions, HEP, weightbearing status, icing/elevating surgical limb, sequencing and body mechanics during functional mobility, use of assistive devices, post-op pain expectations, POC, use of call light for assist; pt verbalized understanding.     Outcome Measures:  [facility] Basic Mobility  Turning from your back to your side while in a flat bed without using bedrails: None  Moving from lying on your back to sitting on the side of a flat bed without using bedrails: None  Moving to and from bed to chair (including a wheelchair): None  Standing up from a chair using your arms (e.g. wheelchair or bedside chair): None  To walk in hospital room: None  Climbing 3-5 steps with railing: None  Basic Mobility - Total Score: 24        Education Documentation  Handouts, taught by Daniela Pugh PT at 7/2/2025  6:06 PM.  Learner: Significant Other, Patient  Readiness: Eager  Method: Explanation, Demonstration, Handout  Response: Verbalizes Understanding, Demonstrated Understanding    Precautions, taught by Daniela Pugh PT at 7/2/2025  6:06 PM.  Learner: Significant Other, Patient  Readiness: Eager  Method: Explanation, Demonstration, Handout  Response: Verbalizes Understanding, Demonstrated Understanding    Body Mechanics, taught by Daniela Pugh PT at 7/2/2025   6:06 PM.  Learner: Significant Other, Patient  Readiness: Eager  Method: Explanation, Demonstration, Handout  Response: Verbalizes Understanding, Demonstrated Understanding    Home Exercise Program, taught by Daniela Pugh PT at 7/2/2025  6:06 PM.  Learner: Significant Other, Patient  Readiness: Eager  Method: Explanation, Demonstration, Handout  Response: Verbalizes Understanding, Demonstrated Understanding    Mobility Training, taught by Daniela Pugh PT at 7/2/2025  6:06 PM.  Learner: Significant Other, Patient  Readiness: Eager  Method: Explanation, Demonstration, Handout  Response: Verbalizes Understanding, Demonstrated Understanding    Education Comments  No comments found.            Daniela Pugh PT, DPT

## 2025-07-02 NOTE — ANESTHESIA PROCEDURE NOTES
Spinal Block    Patient location during procedure: OR  Start time: 7/2/2025 10:55 AM  End time: 7/2/2025 11:05 AM  Reason for block: primary anesthetic, at surgeon's request and post-op pain management  Staffing  Performed: CRNA   Authorized by: Angelo Banuelos MD    Performed by: Vineet Duarte, APRN-CNP, APRN-CRNA    Preanesthetic Checklist  Completed: patient identified, IV checked, risks and benefits discussed, surgical consent, pre-op evaluation, timeout performed and sterile techniques followed  Block Timeout  RN/Licensed healthcare professional reads aloud to the Anesthesia provider and entire team: Patient identity, procedure with side and site, patient position, and as applicable the availability of implants/special equipment/special requirements.  Patient on coagulant treatment: no  Timeout performed at: 7/2/2025 10:55 AM  Spinal Block  Patient position: sitting  Prep: Betadine  Sterility prep: drape, gloves, hand hygiene and mask  Sedation level: no sedation  Patient monitoring: blood pressure, continuous pulse oximetry and heart rate  Approach: midline  Vertebral space: L2-3  Injection technique: single-shot  Needle  Needle type: pencil-point   Needle gauge: 25 G  Needle length: 3.5 in  Free flowing CSF: yes    Assessment  Sensory level: T6 bilateral  Block outcome: patient comfortable  Procedure assessment: patient tolerated procedure well with no immediate complications

## 2025-07-02 NOTE — PROGRESS NOTES
S: ANUSHKA since OR. Still somnolent in PACU. Pain controlled. Family/friend updated by me personally, pleased with care.          O  VS:  Temp:  [36 °C (96.8 °F)-37.3 °C (99.1 °F)] 36 °C (96.8 °F)  Heart Rate:  [62-86] 86  Resp:  [14-18] 18  BP: (117-127)/(78-83) 123/82    Gen: NAD  Focused exam of operative extremity:  -Dressing with minor stains  -SILT in S/S/SP/DP/T/F distributions  -Able to flex Q, TA, GS  -Palpable DP pulse, symmetric to contralateral  -Thigh high TEDs, SCDs in place    Drain  Na  Micro  Na  PT/OT  Pending. PT   Labs (pertinent)  In Ami if stays  Imaging  R Knee radiographs (AP/lat): TKA components in acceptable position with no sign of gross implant/hardware failure.          A&P: Radha Shabazz is a 70 y.o. female s/p R primary TKA on 07/02/25.  Abx prophylaxis (does not need to stay for postop dose if otherwise ready to DC)  Activity: WBAT  Analgesia: Multimodal with breakthrough   Consults: Appreciate therapy, DB, case mgmt, IM  Diet: Routine. DC IVF when adequate PO intake  Dressing: Aquacel/Mepilex for 7d. Patient will remove at home then apply daily dry dressing PRN  DVT ppx: Early mobilization, SCDs, pharmacologic  BLE swelling: Compression hose  DC: Pending PT/OT

## 2025-07-02 NOTE — NURSING NOTE
Pt arrived to Rapid Recovery, pt and family oriented to new room and surroundings. Both informed of plan of care for the day.

## 2025-07-02 NOTE — OP NOTE
Right total knee arthroplasty operative note    Date: 2025  OR Location: [facility]    Name: Radha Shabazz, : 1955, Age: 70 y.o., MRN: [id number], Sex: female    Diagnosis  Pre-op Diagnosis      * Arthritis of right knee [M17.11] Post-op Diagnosis     * Arthritis of right knee [M17.11]     Procedures  RESURFACING ARTHROPLASTY, KNEE, TOTAL (Regan,DION) ** Rapid Recovery **  74955 - ND ARTHRP KNE CONDYLE&PLATU MEDIAL&LAT COMPARTMENTS      Surgeons      * Yusuf Nevarez - Primary    Resident/Fellow/Other Assistant:  Surgeons and Role:  * No surgeons found with a matching role *    Staff:   Circulator: Aminah  Scrub Person: Jean Pierre  Scrub Person: Jing Marroquin Circulator: Kory  Scrub Person: Windy    Anesthesia Staff: Anesthesiologist: Angelo Banuelos MD  CRNA: Vineet Duarte, APRN-CNP, APRN-CRNA    Procedure Summary  Anesthesia: Regional, Monitor Anesthesia Care, Spinal  ASA: III  Estimated Blood Loss: 100mL  Intra-op Medications:   Administrations occurring from 1020 to 1350 on 25:   Medication Name Total Dose   ropivacaine-epinephrine-clonidine-ketorolac 2.46-0.005- 0.0008-0.3mg/mL periarticular syringe 50 mL   ceFAZolin (Ancef) 2 g in dextrose (iso)  mL 2 g   dexAMETHasone (Decadron) 4 mg/mL IV Syringe 2 mL 5 mg   fentaNYL PF (Sublimaze) injection 50 mcg 100 mcg   midazolam (Versed) injection 2 mg 2 mg   ropivacaine (Naropin) injection 0.5 % 20 mL   dexAMETHasone (Decadron) 4 mg/mL IV Syringe 2 mL 8 mg   ePHEDrine injection 15 mg   ePHEDrine injection 35 mg   ketamine injection 50 mg/ 5 mL (10 mg/mL) 50 mg   LR bolus Cannot be calculated   ondansetron (Zofran) 2 mg/mL injection 4 mg   phenylephrine 100 mcg/mL syringe 10 mL (prefilled) 700 mcg   propofol (Diprivan) injection 10 mg/mL 500 mg   tranexamic acid (Cyklokapron) injection 2,000 mg              Anesthesia Record               Intraprocedure I/O Totals          Intake    LR bolus 1800.00 mL    Tranexamic Acid 20.00 mL    The total shown  is the total volume documented since Anesthesia Start was filed.    ceFAZolin (Ancef) 2 g in dextrose (iso)  mL 100.00 mL    Total Intake 1920 mL          Specimen: No specimens collected              Drains and/or Catheters: * None in log *    Tourniquet Times:     Total Tourniquet Time Documented:  Thigh (Right) - 82 minutes  Total: Thigh (Right) - 82 minutes      Implants:  Implant Name Type Inv. Item Serial No.  Lot No. LRB No. Used Action   CEMENT, BONE, SIMPLEX HV FULL DOSE W/GENTAMYCIN 40 GM - [id number] Joint CEMENT, BONE, SIMPLEX HV FULL DOSE W/GENTAMYCIN 40 GM  [employer] [id number] Right 1 Implanted   CEMENT, BONE, SIMPLEX HV FULL DOSE W/GENTAMYCIN 40 GM - [id number] Joint CEMENT, BONE, SIMPLEX HV FULL DOSE W/GENTAMYCIN 40 GM  [employer] [id number] Right 1 Implanted   BASEPLATE, TIBIA 3 TS TRIATH - [id number] Joint BASEPLATE, TIBIA 3 TS TRIATH  [employer]al[employer] [id number] Right 1 Implanted   FEM COMP, TRIATH CR SZ4 RIGHT - [id number] Joint FEM COMP, TRIATH CR SZ4 RIGHT  [employer] [id number] Right 1 Implanted   PATELLA, TRIATHLON, ASYMMETRIC X3, SZ-A29 9MM - [id number] Joint PATELLA, TRIATHLON, ASYMMETRIC X3, SZ-A29 9MM  [employer] [id number] Right 1 Implanted   INSERT, TIBIAL, X3 TRIATHLON CS, SZ-3 9MM - [id number] Joint INSERT, TIBIAL, X3 TRIATHLON CS, SZ-3 9MM  [employer] [id number] Right 1 Implanted       Physician Narrative  Preoperative Diagnosis: Right Knee Degenerative Joint Disease  Postoperative Diagnosis: Right Knee Degenerative Joint Disease  Procedure: Right Total Knee Arthroplasty  Surgical Approach: Medial Parapatellar    Task performed by RFNA or Surgical Assistant  Positioning, retraction, closure.    Special Considerations  None    Findings  End stage right knee degenerative joint disease as seen on preoperative imaging.    Preoperative Course  Please see clinic/consult notes for full discussion of history, indications, and treatment  alternatives. Briefly, the patient had end stage right knee degenerative joint disease causing significant symptoms. Unfortunately, conservative options had been exhausted and did not provide satisfactory pain and function levels. Ultimately, the patient and I made a shared decision to proceed with surgery.    Procedure  A first assistant actively participated and was necessary for one or more of the following: opening, exposure and visualization during the case, maintaining hemostasis, wound closure resulting in its safe and expeditious completion. I was present for the entire procedure. The patient was identified in the preoperative area. The operative site was marked, informed consent reviewed, and operative procedure confirmed. The patient was taken to the operating room and anesthesia achieved. The patient was positioned on the surgical table with bony prominences well-padded. The operative extremity was sterilely prepped and draped. A surgical time-out was performed to confirm the patient, site, procedure, and administration of TXA and prophylactic antibiotics. New gloves were donned after draping and prior to closure, at which time a fresh closing tray with clean instruments was used. An additional dose of TXA was given when closing the wound.     The leg was exsanguinated and tourniquet inflated. A midline longitudinal incision was made and, with a new scalpel, carried down to the extensor mechanism. Full thickness skin flaps were raised, and a medial parapatellar arthrotomy performed. A full thickness medial release was performed, supracondylar synovium removed, lateral plica released, infrapatellar fat pad excised, and femoral origin of the ACL and PCL released. With exposure established, end stage arthritic changes were confirmed.    Attention was directed to the patella. Thickness was measured, resection performed, thickness verified, and protector placed.    Femoral and tibial arrays were attached  intra-incisional and extra-incisional, respectively. Hip center was obtained, checkpoints were registered, and the anatomy was registered to the preoperative CT scan. Native alignment was measured. Osteophytes were removed and releases performed to enable alignment correction. Medial and lateral extension and flexion gaps were obtained. Bone cuts were planned.    The robotic arm was brought into the field. The distal femur cut was made and checked. The remainder of the femoral cuts were made. The tibial cut was made and checked. A spacer block was used to confirm preliminary alignment and gaps, adjusting cuts as needed. The medial meniscus, lateral meniscus, and posterior osteophytes were removed. The periarticular injection was injected in the posteromedial knee.    The patella was sized and lug holes drilled, with a caliper confirming a final thickness equal to the native patella.    Trial components were inserted. Patellar tracking and full extension and stability in both extension and 90° flexion were confirmed. The tibia was floated during this and resting position marked. Femoral lug holes were drilled. Trial components were removed.    The tibia was sized and prepared, including interdigitation of sclerotic areas. New gloves were donned. The knee was extensively irrigated with saline, and cancellous bone surfaces were dried. Antibiotic HV cement was mixed. The tibial and femoral components were cemented in place and compressed with a trial polyethylene. The leg was held in extension, and the patella was cemented in and held with a clamp.    The wound was soaked with 1 liter of dilute povidone iodine. The tourniquet was deflated and hemostasis obtained. The betadine soak was irrigated with saline. The final polyethylene size was trialed and inserted. Arrays, checkpoints, and pins were removed after final alignment was checked. The tibial insertion sites were closed with 2-0 suture and 3-0 nylon. The knee was  partially flexed and closed in layers (capsule, additional superficial irrigation, deep dermal tissues, subcuticular running suture, and mesh with skin glue).    A sterile dressing and thigh high NELSON hose were applied. Anesthesia was concluded. The patient was transferred to the stretcher and recovery room in stable condition.     Information and Plan  Nerve block: Spinal, adductor canal, IPACK  Complications: None apparent  Weightbearing: As tolerated  Disposition: PACU

## 2025-07-03 ENCOUNTER — HOME CARE VISIT (OUTPATIENT)
Dept: HOME HEALTH SERVICES | Facility: HOME HEALTH | Age: 70
End: 2025-07-03
Payer: COMMERCIAL

## 2025-07-03 VITALS
DIASTOLIC BLOOD PRESSURE: 60 MMHG | OXYGEN SATURATION: 95 % | SYSTOLIC BLOOD PRESSURE: 90 MMHG | TEMPERATURE: 97.9 F | RESPIRATION RATE: 16 BRPM | HEART RATE: 70 BPM

## 2025-07-03 PROCEDURE — G0151 HHCP-SERV OF PT,EA 15 MIN: HCPCS

## 2025-07-03 ASSESSMENT — ENCOUNTER SYMPTOMS
LOWEST PAIN SEVERITY IN PAST 24 HOURS: 3/10
PERSON REPORTING PAIN: PATIENT
HIGHEST PAIN SEVERITY IN PAST 24 HOURS: 8/10
PAIN LOCATION: RIGHT KNEE
PAIN: 1

## 2025-07-03 ASSESSMENT — ACTIVITIES OF DAILY LIVING (ADL): ENTERING_EXITING_HOME: STAND BY ASSIST

## 2025-07-03 NOTE — SIGNIFICANT EVENT
Thank you for taking my call today regarding your recent joint replacement surgery with Dr. Yusuf Nevarez.      We discussed that: Home Health Care services (physical and/or occupational therapy) have been initiated Your pain is Controlled on the current regimen Will fluctuate throughout recovery with increased activity You are able to tolerate regular activity and exercises The importance of continued cold therapy throughout recovery The importance of following the prescribed precautions by your surgeon You have not had a bowel movement, and we discussed the importance of a well balanced diet, hydration, and continued use of stool softener/laxative as prescribed The importance of continuing blood thinner as prescribed The importance of wearing compression stockings as prescribed    You indicated that all of your questions have been answered at the time of our call.    Please don't hesitate to reach out if you have any additional questions or concerns.    Thank you,  XU FloresN, RN  Hawa Johnson, RN  Orthopedic Patient Navigator  [facility], [place]   [phone]

## 2025-07-03 NOTE — PROGRESS NOTES
Medication Education     Medication education for Radha Shabazz was provided to the patient and family for the following medication(s):  Acetaminophen 500  Cefadroxil 500  Docusate 100  Eliquis 2.5  Oxycodone 5  PEG 17  Pregabalin 75  Tramadol 50      Medication education provided by a Pharmacist:  Proper dose, indication, possible ADRs    Identified potential barriers to education:  None    Method(s) of Education:  Verbal Written materials provided and reviewed    An opportunity to ask questions and receive answers was provided.     Assessment of understanding the patient and family:  2= meets goals/outcomes    Additional Notes (if applicable):     KVNG MARCANO, PharmD

## 2025-07-04 SDOH — HEALTH STABILITY: PHYSICAL HEALTH: EXERCISE COMMENTS: SUPINE ANKLE PUMPS, QUAD SETS, SAQ, HEEL SLIDES 10 REPS  SITTING LAQ

## 2025-07-04 ASSESSMENT — GAIT ASSESSMENTS
STEP SYMMETRY: 0 - RIGHT AND LEFT STEP LENGTH NOT EQUAL
PATH: 1 - MILD/MODERATE DEVIATION OR USES WALKING AID
WALKING STANCE: 0 - HEELS APART
STEP CONTINUITY: 1 - STEPS APPEAR CONTINUOUS
TRUNK SCORE: 1
TRUNK: 1 - NO SWAY BUT FLEXION OF KNEES OR BACK OR SPREADS ARMS WHILE WALKING
INITIATION OF GAIT IMMEDIATELY AFTER GO: 1 - NO HESITANCY
PATH SCORE: 1
BALANCE AND GAIT SCORE: 15
GAIT SCORE: 6

## 2025-07-04 ASSESSMENT — BALANCE ASSESSMENTS
ATTEMPTS TO ARISE: 1 - ABLE, REQUIRES MORE THAN ONE ATTEMPT
STANDING BALANCE: 1 - STEADY BUT WIDE STANCE AND USES CANE OR OTHER SUPPORT
ARISES: 1 - ABLE, USES ARMS TO HELP
IMMEDIATE STANDING BALANCE FIRST 5 SECONDS: 1 - STEADY BUT USES WALKER OR OTHER SUPPORT
EYES CLOSED AT MAXIMUM POSITION NUDGED: 1 - STEADY
BALANCE SCORE: 9
NUDGED: 1 - STAGGERS, GRABS, CATCHES SELF
SITTING DOWN: 2 - SAFE, SMOOTH MOTION
NUDGED SCORE: 1
SITTING BALANCE: 1 - STEADY, SAFE
TURNING 360 DEGREES STEPS: 0 - DISCONTINUOUS STEPS
ARISING SCORE: 1

## 2025-07-04 ASSESSMENT — ACTIVITIES OF DAILY LIVING (ADL)
AMBULATION_DISTANCE/DURATION_TOLERATED: 20
AMBULATION ASSISTANCE ON FLAT SURFACES: 1
CURRENT_FUNCTION: STAND BY ASSIST
AMBULATION ASSISTANCE: STAND BY ASSIST
PHYSICAL TRANSFERS ASSESSED: 1
AMBULATION ASSISTANCE: 1
OASIS_M1830: 05

## 2025-07-04 ASSESSMENT — ENCOUNTER SYMPTOMS
LIMITED RANGE OF MOTION: 1
OCCASIONAL FEELINGS OF UNSTEADINESS: 0
MUSCLE WEAKNESS: 1

## 2025-07-07 ENCOUNTER — HOME CARE VISIT (OUTPATIENT)
Dept: HOME HEALTH SERVICES | Facility: HOME HEALTH | Age: 70
End: 2025-07-07
Payer: COMMERCIAL

## 2025-07-07 VITALS
HEART RATE: 69 BPM | OXYGEN SATURATION: 98 % | TEMPERATURE: 97.3 F | RESPIRATION RATE: 16 BRPM | DIASTOLIC BLOOD PRESSURE: 70 MMHG | SYSTOLIC BLOOD PRESSURE: 100 MMHG

## 2025-07-07 PROCEDURE — G0151 HHCP-SERV OF PT,EA 15 MIN: HCPCS

## 2025-07-07 SDOH — HEALTH STABILITY: PHYSICAL HEALTH: EXERCISE COMMENTS: SUPINE QUAD SETS, ANKLE PUMPS, HEEL SLIDES, SAQ, SLR 10 REPS  SITTING HIP FLEXION, LAQ 5 TO 10 REPS

## 2025-07-07 ASSESSMENT — ACTIVITIES OF DAILY LIVING (ADL)
CURRENT_FUNCTION: INDEPENDENT
AMBULATION ASSISTANCE: STAND BY ASSIST
AMBULATION ASSISTANCE: 1
AMBULATION_DISTANCE/DURATION_TOLERATED: 40
AMBULATION ASSISTANCE ON FLAT SURFACES: 1
PHYSICAL TRANSFERS ASSESSED: 1

## 2025-07-07 ASSESSMENT — ENCOUNTER SYMPTOMS
MUSCLE WEAKNESS: 1
LIMITED RANGE OF MOTION: 1
LOWEST PAIN SEVERITY IN PAST 24 HOURS: 2/10
HIGHEST PAIN SEVERITY IN PAST 24 HOURS: 7/10
PAIN LOCATION: RIGHT KNEE
PAIN: 1
SUBJECTIVE PAIN PROGRESSION: GRADUALLY IMPROVING
PERSON REPORTING PAIN: PATIENT

## 2025-07-09 ENCOUNTER — HOME CARE VISIT (OUTPATIENT)
Dept: HOME HEALTH SERVICES | Facility: HOME HEALTH | Age: 70
End: 2025-07-09
Payer: COMMERCIAL

## 2025-07-09 ENCOUNTER — APPOINTMENT (OUTPATIENT)
Dept: RADIOLOGY | Facility: HOSPITAL | Age: 70
End: 2025-07-09
Payer: COMMERCIAL

## 2025-07-09 ENCOUNTER — APPOINTMENT (OUTPATIENT)
Dept: PREADMISSION TESTING | Facility: HOSPITAL | Age: 70
End: 2025-07-09
Payer: COMMERCIAL

## 2025-07-09 ENCOUNTER — APPOINTMENT (OUTPATIENT)
Dept: ORTHOPEDIC SURGERY | Facility: HOSPITAL | Age: 70
End: 2025-07-09
Payer: COMMERCIAL

## 2025-07-09 VITALS
RESPIRATION RATE: 16 BRPM | OXYGEN SATURATION: 98 % | TEMPERATURE: 98.1 F | DIASTOLIC BLOOD PRESSURE: 70 MMHG | HEART RATE: 72 BPM | SYSTOLIC BLOOD PRESSURE: 110 MMHG

## 2025-07-09 PROCEDURE — G0151 HHCP-SERV OF PT,EA 15 MIN: HCPCS

## 2025-07-09 SDOH — HEALTH STABILITY: PHYSICAL HEALTH
EXERCISE COMMENTS: SUPINE QUAD SETS, ANKLE PUMPS, HEEL SLIDES, SAQ, SLR 10 REPS  STANDING TOE RAISES, KNEE FLEXION, HIP FLEXION 10 REPS  SITTING KNEE FLEXION, HIP FLEXION 10 REPS

## 2025-07-09 ASSESSMENT — ACTIVITIES OF DAILY LIVING (ADL)
AMBULATION_DISTANCE/DURATION_TOLERATED: 40
AMBULATION ASSISTANCE ON FLAT SURFACES: 1

## 2025-07-09 ASSESSMENT — ENCOUNTER SYMPTOMS
LOWEST PAIN SEVERITY IN PAST 24 HOURS: 6/10
PAIN LOCATION: RIGHT KNEE
HIGHEST PAIN SEVERITY IN PAST 24 HOURS: 8/10
PERSON REPORTING PAIN: PATIENT
PAIN: 1

## 2025-07-11 ENCOUNTER — HOME CARE VISIT (OUTPATIENT)
Dept: HOME HEALTH SERVICES | Facility: HOME HEALTH | Age: 70
End: 2025-07-11
Payer: COMMERCIAL

## 2025-07-11 VITALS
SYSTOLIC BLOOD PRESSURE: 110 MMHG | OXYGEN SATURATION: 97 % | HEART RATE: 76 BPM | RESPIRATION RATE: 16 BRPM | TEMPERATURE: 97.4 F | DIASTOLIC BLOOD PRESSURE: 70 MMHG

## 2025-07-11 PROCEDURE — G0151 HHCP-SERV OF PT,EA 15 MIN: HCPCS

## 2025-07-11 SDOH — HEALTH STABILITY: PHYSICAL HEALTH
EXERCISE COMMENTS: SUPINE QUAD SETS, ANKLE PUMPS, HEEL SLIDES, SAQ  SITTING HIP FLEXION, LAQ  STANDING TOE RAISES, KNEE FLEXION, HIP FLEXION 15 REPS

## 2025-07-11 ASSESSMENT — ACTIVITIES OF DAILY LIVING (ADL)
AMBULATION_DISTANCE/DURATION_TOLERATED: 50
AMBULATION ASSISTANCE ON FLAT SURFACES: 1
AMBULATION ASSISTANCE: 1
AMBULATION ASSISTANCE: STAND BY ASSIST

## 2025-07-11 ASSESSMENT — ENCOUNTER SYMPTOMS
PAIN: 1
LOWEST PAIN SEVERITY IN PAST 24 HOURS: 6/10
LIMITED RANGE OF MOTION: 1
MUSCLE WEAKNESS: 1
PAIN LOCATION: RIGHT KNEE
HIGHEST PAIN SEVERITY IN PAST 24 HOURS: 8/10
PERSON REPORTING PAIN: PATIENT

## 2025-07-15 ENCOUNTER — HOME CARE VISIT (OUTPATIENT)
Dept: HOME HEALTH SERVICES | Facility: HOME HEALTH | Age: 70
End: 2025-07-15
Payer: COMMERCIAL

## 2025-07-15 VITALS
OXYGEN SATURATION: 96 % | TEMPERATURE: 97.4 F | HEART RATE: 76 BPM | DIASTOLIC BLOOD PRESSURE: 70 MMHG | SYSTOLIC BLOOD PRESSURE: 110 MMHG | RESPIRATION RATE: 16 BRPM

## 2025-07-15 PROCEDURE — G0151 HHCP-SERV OF PT,EA 15 MIN: HCPCS

## 2025-07-15 SDOH — HEALTH STABILITY: PHYSICAL HEALTH
EXERCISE COMMENTS: SUPINE QUAD SETS, ANKLE PUMPS, HEEL SLIDES, SAQ, SLR  STANDING TOE RAISES, LAQ, HIP FLEXION 10 REPS  STAIRSTRETCH FOR RIGHT KNEE FLEXION AND EXTENSION 10 REPS

## 2025-07-15 ASSESSMENT — ENCOUNTER SYMPTOMS
LOWEST PAIN SEVERITY IN PAST 24 HOURS: 3/10
PAIN LOCATION: RIGHT KNEE
PERSON REPORTING PAIN: PATIENT
LIMITED RANGE OF MOTION: 1
HIGHEST PAIN SEVERITY IN PAST 24 HOURS: 5/10
MUSCLE WEAKNESS: 1
PAIN: 1

## 2025-07-15 ASSESSMENT — ACTIVITIES OF DAILY LIVING (ADL)
AMBULATION ASSISTANCE: SUPERVISION
AMBULATION ASSISTANCE ON FLAT SURFACES: 1
AMBULATION_DISTANCE/DURATION_TOLERATED: 50
AMBULATION ASSISTANCE: 1

## 2025-07-17 ENCOUNTER — HOME CARE VISIT (OUTPATIENT)
Dept: HOME HEALTH SERVICES | Facility: HOME HEALTH | Age: 70
End: 2025-07-17
Payer: COMMERCIAL

## 2025-07-17 VITALS
HEART RATE: 73 BPM | RESPIRATION RATE: 16 BRPM | SYSTOLIC BLOOD PRESSURE: 120 MMHG | TEMPERATURE: 98 F | OXYGEN SATURATION: 97 % | DIASTOLIC BLOOD PRESSURE: 70 MMHG

## 2025-07-17 PROCEDURE — G0151 HHCP-SERV OF PT,EA 15 MIN: HCPCS

## 2025-07-17 SDOH — HEALTH STABILITY: PHYSICAL HEALTH
EXERCISE COMMENTS: SUPINE ANKLE PUMPS, QUAD SETS, SAQ, HEEL SLIDES 10 REPS  NO OTHER EXERCISES TODAY DUE TO INCREASE PAIN

## 2025-07-17 ASSESSMENT — ENCOUNTER SYMPTOMS
LIMITED RANGE OF MOTION: 1
LOWEST PAIN SEVERITY IN PAST 24 HOURS: 6/10
PAIN: 1
PAIN LOCATION: RIGHT KNEE
PERSON REPORTING PAIN: PATIENT
MUSCLE WEAKNESS: 1
HIGHEST PAIN SEVERITY IN PAST 24 HOURS: 8/10

## 2025-07-17 ASSESSMENT — ACTIVITIES OF DAILY LIVING (ADL)
AMBULATION ASSISTANCE: SUPERVISION
AMBULATION ASSISTANCE ON FLAT SURFACES: 1
AMBULATION ASSISTANCE: 1
AMBULATION_DISTANCE/DURATION_TOLERATED: 50

## 2025-07-21 ENCOUNTER — HOME CARE VISIT (OUTPATIENT)
Dept: HOME HEALTH SERVICES | Facility: HOME HEALTH | Age: 70
End: 2025-07-21
Payer: COMMERCIAL

## 2025-07-21 VITALS
DIASTOLIC BLOOD PRESSURE: 70 MMHG | OXYGEN SATURATION: 97 % | HEART RATE: 80 BPM | SYSTOLIC BLOOD PRESSURE: 108 MMHG | RESPIRATION RATE: 16 BRPM | TEMPERATURE: 97.9 F

## 2025-07-21 PROCEDURE — G0151 HHCP-SERV OF PT,EA 15 MIN: HCPCS

## 2025-07-21 SDOH — HEALTH STABILITY: PHYSICAL HEALTH
EXERCISE COMMENTS: SUPINE QUAD SETS, ANKLE PUMPS, HEEL SLIDES, SAQ, SLR 10 TO 15 REPS  INSTRUCTED IN SITTING IN KITCHEN WITH LEG ELEVATED ON ANOTHER CHAIR TO STRETCH KNEE EXTENSION  RECUMBENT BIKE ROCKING BACK AND FORTH

## 2025-07-21 ASSESSMENT — ENCOUNTER SYMPTOMS
PERSON REPORTING PAIN: PATIENT
PAIN LOCATION: RIGHT KNEE
LIMITED RANGE OF MOTION: 1
MUSCLE WEAKNESS: 1
LOWEST PAIN SEVERITY IN PAST 24 HOURS: 4/10
HIGHEST PAIN SEVERITY IN PAST 24 HOURS: 5/10
PAIN: 1

## 2025-07-21 ASSESSMENT — ACTIVITIES OF DAILY LIVING (ADL)
AMBULATION ASSISTANCE ON FLAT SURFACES: 1
AMBULATION ASSISTANCE: SUPERVISION
AMBULATION_DISTANCE/DURATION_TOLERATED: 100
AMBULATION ASSISTANCE: 1

## 2025-07-23 ENCOUNTER — HOME CARE VISIT (OUTPATIENT)
Dept: HOME HEALTH SERVICES | Facility: HOME HEALTH | Age: 70
End: 2025-07-23
Payer: COMMERCIAL

## 2025-07-23 VITALS
OXYGEN SATURATION: 96 % | HEART RATE: 76 BPM | DIASTOLIC BLOOD PRESSURE: 70 MMHG | SYSTOLIC BLOOD PRESSURE: 122 MMHG | RESPIRATION RATE: 16 BRPM | TEMPERATURE: 97.8 F

## 2025-07-23 PROBLEM — Z47.89 ORTHOPEDIC AFTERCARE: Status: ACTIVE | Noted: 2025-07-23

## 2025-07-23 PROBLEM — M25.561 ACUTE PAIN OF RIGHT KNEE: Status: ACTIVE | Noted: 2024-11-08

## 2025-07-23 PROCEDURE — G0151 HHCP-SERV OF PT,EA 15 MIN: HCPCS

## 2025-07-23 SDOH — HEALTH STABILITY: PHYSICAL HEALTH
EXERCISE COMMENTS: SUPINE QUAD SETS, ANKLE PUMPS, HEEL SLIDES, SAQ, SLR 10 TO 15 REPS  RECUMBENT BIKE - PATIENT ABLE TO DO FULL REVOLUTION

## 2025-07-23 ASSESSMENT — GAIT ASSESSMENTS
STEP CONTINUITY: 1 - STEPS APPEAR CONTINUOUS
PATH SCORE: 1
INITIATION OF GAIT IMMEDIATELY AFTER GO: 1 - NO HESITANCY
STEP SYMMETRY: 1 - RIGHT AND LEFT STEP LENGTH APPEAR EQUAL
BALANCE AND GAIT SCORE: 26
WALKING STANCE: 1 - HEELS ALMOST TOUCHING WHILE WALKING
TRUNK SCORE: 2
GAIT SCORE: 11
TRUNK: 2 - NO SWAY, NO FLEXION, NO USE OF ARMS, NO WALKING AID
PATH: 1 - MILD/MODERATE DEVIATION OR USES WALKING AID

## 2025-07-23 ASSESSMENT — ACTIVITIES OF DAILY LIVING (ADL)
AMBULATION ASSISTANCE ON FLAT SURFACES: 1
AMBULATION ASSISTANCE: INDEPENDENT
AMBULATION ASSISTANCE: 1
AMBULATION_DISTANCE/DURATION_TOLERATED: 150
HOME_HEALTH_OASIS: 00
OASIS_M1830: 00

## 2025-07-23 ASSESSMENT — BALANCE ASSESSMENTS
NUDGED SCORE: 2
ATTEMPTS TO ARISE: 2 - ABLE TO RISE, ONE ATTEMPT
BALANCE SCORE: 15
SITTING DOWN: 2 - SAFE, SMOOTH MOTION
ARISING SCORE: 1
SITTING BALANCE: 1 - STEADY, SAFE
NUDGED: 2 - STEADY
EYES CLOSED AT MAXIMUM POSITION NUDGED: 1 - STEADY
IMMEDIATE STANDING BALANCE FIRST 5 SECONDS: 2 - STEADY WITHOUT WALKER OR OTHER SUPPORT
TURNING 360 DEGREES STEPS: 1 - CONTINUOUS STEPS
ARISES: 1 - ABLE, USES ARMS TO HELP
STANDING BALANCE: 2 - NARROW STANCE WITHOUT SUPPORT

## 2025-07-23 ASSESSMENT — ENCOUNTER SYMPTOMS
SUBJECTIVE PAIN PROGRESSION: GRADUALLY IMPROVING
PAIN: 1
OCCASIONAL FEELINGS OF UNSTEADINESS: 0
HIGHEST PAIN SEVERITY IN PAST 24 HOURS: 5/10
PAIN LOCATION: RIGHT KNEE
PERSON REPORTING PAIN: PATIENT
LOWEST PAIN SEVERITY IN PAST 24 HOURS: 4/10

## 2025-07-23 NOTE — PROGRESS NOTES
Physical Therapy  Physical Therapy Orthopedic Evaluation    Patient Name: Radha Shabazz  MRN: [id number]  Today's Date: 7/24/2025    Time Calculation  Start Time: 1601  Stop Time: 1635  Time Calculation (min): 34 min    Insurance:  Insurance Type: Devoted; $50 co-pay  Visit number: 1  Visit Limit: MN  Authorization info: NA    General:  Reason for visit: s/p R TKA on 7/2/25  Referred by: Roque      Precautions: low fall risk     STEADI Fall Risk Score (The score of 4 or more indicates an increased risk of falling): 1    Current Problem  1. Acute pain of right knee  Follow Up In Physical Therapy      2. Orthopedic aftercare  Follow Up In Physical Therapy            Subjective:   Subjective   Chief Complaint: s/p R TKA  Onset: 7/2/25  VLADIMIR: chronic     Pt reports to session s/p R TKA on 7/2/25. Pt successfully completed home health therapy without issues. She has progressed from walker to cane and has been walking without assistance in home. Pt denies any DVT symptoms or numbness, tingling. Denies any falls. Pt is hoping to return to PLOF.    PMH: cancer    Current Condition:  better    PAIN  Intensity (0-10): 2/10 current; 4/10 worst  Location: R knee  Description: dull, ache    Aggravating Factors:  walking, standing, stairs  Relieving Factors:  rest    Relevant Information (PMH & Previous Tests/Imaging): x ray   Previous Interventions/Treatments: s/p    Prior Level of Function (PLOF)  Exercise/Physical Activity: independent  Work/School: independent    Treatment Goals: reduce pain, return to independent walking     Red Flags: Do you have any of the following? No  Fever/chills, unexplained weight changes, dizziness/fainting, unexplained change in bowel or bladder functions, unexplained malaise or muscle weakness, night pain/sweats, numbness or tingling    Objective:  Objective   Observation: steri-strips intact; no outward signs of infection; B compression stockings     Gait: mild antalgic gait pattern with cane  consisting of lack of terminal knee extension    4 Stage Balance Test  Close stance: 30 sec   Semi-Tandem: 30 sec B  Tandem  R: 30 sec   L: 30 sec  Single Leg  R: 5 sec   L: 15 sec    Knee AROM  Flexion  R: 110 deg   L: 130 deg   Extension  R: 0 deg   L: +2 deg       LE MMT   Knee Flexion  R: 4  L: 5  Knee Extension  R: 4  L: 5    Flexibility  Hamstrings: mod tight  Gastroc: mod tight    Palpation: mild tenderness noted along incision    Accessory Mobility: mildly hypomobile patella in superior-inferior directions    Functional Screening  Squat: nt  Lunge: nt  SL Balance: 5 sec   Lateral Step Down: nt  SL Quarter Squat: nt  Transfers: mod independent to independent    Outcome Measures:  Other Measures  Lower Extremity Funtional Score (LEFS): 60     EDUCATION: home exercise program, plan of care, activity modifications, pain management, and injury pathology       Goals:  Active       PT Problem       PT Goal 1       Start:  07/24/25    Expected End:  08/21/25       In 4 weeks, pt will rate pain 0-1 on the numeric pain scale to allow for restful nights of sleep.  In 4 weeks, pt will be able to walk grocery store for 30 minutes without an increase in pain.  In 4 weeks, pt will be able to stand to perform hygiene routine safely for 30 minutes without an increase in pain.           PT Goal 2       Start:  07/24/25    Expected End:  09/18/25       In 8 weeks, pt will be able to return to PLOF.  In 8 weeks, pt's LEFS will be +10.  In 8 weeks, pt will be independent with HEP.               Treatments:   See below    Equipment Provided:   HEP Provided:    Access Code: [email or web address]  URL: [email or web address]  Date: 07/24/2025  Prepared by: Perry Deluca    Exercises  - Supine Calf Stretch with Strap  - 1 x daily - 7 x weekly - 2 sets - 30-60 seconds hold  - Long Sitting Hamstring Stretch  - 1 x daily - 7 x weekly - 2 sets - 30-60 seconds hold  - Supine Knee Extension Mobilization with Weight  - 1 x  daily - 7 x weekly - as tolerable hold  - Supine Bridge  - 1 x daily - 7 x weekly - 2 sets - 15 reps  - Sit to Stand Without Arm Support  - 1 x daily - 7 x weekly - 2 sets - 15 reps  - Single Leg Stance with Support  - 1 x daily - 7 x weekly - 2 sets - 10-60 seconds hold  - Supine Ankle Pumps  - 1 x daily - 7 x weekly - 2 sets - 20 reps    Charges: low complexity eval x 1, Therapeutic Exercise x 1    Assessment: Pt is a 69 y/o F s/p R TKA on 25. Pt displayed lack of knee range of motion, gross lower extremity strength as well as flexibility, pain and functional mobility deficits. Skilled physical therapy is necessary in order to improve aforementioned impairments to allow for increased participation in functional and recreational activities without limitations. Plan of care will consist of core, lower extremity stretching and strengthening in order to return to PLOF.    Eval Complexity: Low  Clinical Presentation: Stable  Prognosis: Good      Plan: Continue to improve functional mobility, functional strength in order to increase participation in ADLs.    Patient and/or family understands and agrees with goals and plan documented.       Planned Interventions include: therapeutic exercise, self-care home management, manual therapy, therapeutic activities, neuromuscular coordination, vasopneumatic device with cold, blood flow restriction (BFR), dry needling, gait training, aquatic therapy  Frequency: 1-2x/week  Duration: 8 weeks    Insurance Auth Information    Date of Evaluation: 25    Onset Date: 2025    Referring Physician: Yusuf Nevarez     Surgery in the Last 3 months:  yes    CPT Codes: Therapeutic Exercise: 19062 Therapeutic Activity: 69971 Neuromuscular Re-Education: 75034 Manual Therapy: 00370 Gait Trainin Self-Care/Home Management Trainin Mechanical Traction: 72451 Electric Stimulation (Attended): 83810 Electric Stimulation (Unattended): 96120 Vasopneumatic Device: 52178 PT  Re-Evaluation: 99490     Diagnosis:   Problem List Items Addressed This Visit           ICD-10-CM    Acute pain of right knee - Primary M25.561    Relevant Orders    Follow Up In Physical Therapy    Orthopedic aftercare Z47.89    Relevant Orders    Follow Up In Physical Therapy          OT / PT Evaluation complexity:  low    Which of the following best describes the primary reason of therapy: Improving, restoring, or adapting functional mobility or skills    Visits Requested: 15    Date Range: 90 days    Select all conditions that apply: None of these apply       Perry Deluca, PT

## 2025-07-24 ENCOUNTER — EVALUATION (OUTPATIENT)
Dept: PHYSICAL THERAPY | Facility: CLINIC | Age: 70
End: 2025-07-24
Payer: COMMERCIAL

## 2025-07-24 DIAGNOSIS — Z47.89 ORTHOPEDIC AFTERCARE: ICD-10-CM

## 2025-07-24 DIAGNOSIS — M25.561 ACUTE PAIN OF RIGHT KNEE: Primary | ICD-10-CM

## 2025-07-24 PROCEDURE — 97161 PT EVAL LOW COMPLEX 20 MIN: CPT | Mod: GP

## 2025-07-24 PROCEDURE — 97110 THERAPEUTIC EXERCISES: CPT | Mod: GP

## 2025-07-24 ASSESSMENT — ENCOUNTER SYMPTOMS
OCCASIONAL FEELINGS OF UNSTEADINESS: 0
DEPRESSION: 0
LOSS OF SENSATION IN FEET: 0

## 2025-07-25 ENCOUNTER — APPOINTMENT (OUTPATIENT)
Dept: ORTHOPEDIC SURGERY | Facility: CLINIC | Age: 70
End: 2025-07-25
Payer: COMMERCIAL

## 2025-07-28 NOTE — PROGRESS NOTES
Physical Therapy  Physical Therapy Treatment    Patient Name: Radha Shabazz  MRN: [id number]  Today's Date: 7/29/2025    Time Calculation  Start Time: 1300  Stop Time: 1343  Time Calculation (min): 43 min    Insurance:  Insurance Type: Devoted; $50 co-pay  Visit number: 2  Visit Limit: MN  Authorization info: NA    General:  Reason for visit: s/p R TKA on 7/2/25  Referred by: Roque    Precautions: low fall risk     STEADI Fall Risk Score (The score of 4 or more indicates an increased risk of falling): 1      Current Problem  1. Acute pain of right knee        2. Orthopedic aftercare            Pain: 5/10    Subjective:   Subjective   Patient reports she has been doing well. Notes general soreness over the weekend.    HEP Compliance: Good    Objective:   Objective     Observation: steri-strips intact; no outward signs of infection; B compression stockings     Gait: independent, mild antalgic gait pattern consisting of lack of terminal knee extension    Knee AROM  Flexion  R: 110 deg   L: 130 deg   Extension  R: 0 deg   L: +2 deg       Treatments:     Therapeutic Exercise:                                 Nu step x 5'  Supine calf stretch w/ strap   Supine HS stretch w/ strap  Heel prop w/ 8# weight  Sit to stands w/10# kb 2 x 15   Standing TKE w/ OTB 2 x 15   6 inch step ups w/ 15# kb 2 x 12  6 inch lateral step downs 2 x 15  reviewed HEP      Manual Therapy:                                        PROM  Patellar mobs          Neuromuscular Re-Education:                   Paloff press w/ 5# 2 x 20  Bravo walk outs w/ 5# 2 x 20  Airex marching 2 x 1'      Equipment Provided:   HEP Provided:  Access Code: [email or web address]  URL: [email or web address]  Date: 07/24/2025  Prepared by: Perry Deluca    Exercises  - Supine Calf Stretch with Strap  - 1 x daily - 7 x weekly - 2 sets - 30-60 seconds hold  - Long Sitting Hamstring Stretch  - 1 x daily - 7 x weekly - 2 sets - 30-60 seconds hold  - Supine Knee  Extension Mobilization with Weight  - 1 x daily - 7 x weekly - as tolerable hold  - Supine Bridge  - 1 x daily - 7 x weekly - 2 sets - 15 reps  - Sit to Stand Without Arm Support  - 1 x daily - 7 x weekly - 2 sets - 15 reps  - Single Leg Stance with Support  - 1 x daily - 7 x weekly - 2 sets - 10-60 seconds hold  - Supine Ankle Pumps  - 1 x daily - 7 x weekly - 2 sets - 20 reps      Charges: Therapeutic Exercise x 2, Neuromuscular Re-Education x 1    Assessment: The focus of the session was Strengthening, ROM, Stretching, joint mobilizations, Balance, Motor Control, Dynamic Stability Training, and functional training. The pt demonstrated Good tolerance to the noted exercises today. The pt is demonstrated Good progress in skilled rehab at this time. The pt is still limited in overall Strength, ROM, Flexibility, Motor control, Balance, Gait mechanics, Effusion, and Pain at this time. The pt continues to be a good candidate for skilled PT, in order to further improve Strength, ROM, Flexibility, Motor control, Balance, Gait mechanics, Effusion, and Pain.          Plan: Continue to improve functional mobility, functional strength in order to increase participation in ADLs.      Perry Deluca, PT

## 2025-07-29 ENCOUNTER — TREATMENT (OUTPATIENT)
Dept: PHYSICAL THERAPY | Facility: CLINIC | Age: 70
End: 2025-07-29
Payer: COMMERCIAL

## 2025-07-29 DIAGNOSIS — M25.561 ACUTE PAIN OF RIGHT KNEE: Primary | ICD-10-CM

## 2025-07-29 DIAGNOSIS — Z47.89 ORTHOPEDIC AFTERCARE: ICD-10-CM

## 2025-07-29 PROCEDURE — 97112 NEUROMUSCULAR REEDUCATION: CPT | Mod: GP

## 2025-07-29 PROCEDURE — 97110 THERAPEUTIC EXERCISES: CPT | Mod: GP

## 2025-07-30 ENCOUNTER — TELEPHONE (OUTPATIENT)
Dept: HEMATOLOGY/ONCOLOGY | Facility: HOSPITAL | Age: 70
End: 2025-07-30

## 2025-07-30 ENCOUNTER — TELEPHONE (OUTPATIENT)
Dept: PLASTIC SURGERY | Facility: HOSPITAL | Age: 70
End: 2025-07-30
Payer: COMMERCIAL

## 2025-07-30 NOTE — TELEPHONE ENCOUNTER
I received a message from this PT that she would like to come in a see Dr. Dickson because she is done with her treatment. I did call her back and left a message.       Jessy Sanon [phone]

## 2025-07-30 NOTE — PROGRESS NOTES
Physical Therapy  Physical Therapy Treatment    Patient Name: Radha Shabazz  MRN: [id number]  Today's Date: 7/31/2025    Time Calculation  Start Time: 1030  Stop Time: 1110  Time Calculation (min): 40 min    Insurance:  Insurance Type: Devoted; $50 co-pay  Visit number: 3  Visit Limit: MN  Authorization info: NA    General:  Reason for visit: s/p R TKA on 7/2/25  Referred by: Roque    Precautions: low fall risk     STEADI Fall Risk Score (The score of 4 or more indicates an increased risk of falling): 1    Current Problem  1. Acute pain of right knee  Follow Up In Physical Therapy      2. Orthopedic aftercare  Follow Up In Physical Therapy          Pain: 4/10    Subjective:   Subjective   Patient reports she has been doing well. Notes that she [place] walk [place] yesterday, mostly without cane but had it if needed. Notes that she still does step-to pattern on stairs.     HEP Compliance: Good    Objective:   Objective   Observation: steri-strips intact; no outward signs of infection; B compression stockings     Gait: independent, mild antalgic gait pattern consisting of lack of terminal knee extension    Knee AROM  Flexion  R: 115 deg   L: 130 deg   Extension  R: 0 deg   L: +2 deg         Treatments:     Therapeutic Exercise:                                 Nu step x 5'  Bravo TKE w/ 5# 2 x 15  Seated knee extension 2 x 15 w/ pauses  Straight leg physioball bridges 2 x 15   Supine calf stretch w/ strap, 8#  Supine HS stretch w/ strap, 8#  Heel prop w/ 8#  Long sitting SLR over chriss 2 x 20  reviewed HEP      Manual Therapy:                                        PROM  Patellar mobs      Neuromuscular Re-Education:             nt      Paloff press w/ 5# 2 x 20  Bravo walk outs w/ 5# 2 x 20  Airex marching 2 x 1'      Equipment Provided:   HEP Provided:  Access Code: [email or web address]  URL: [email or web address]  Date: 07/31/2025  Prepared by: Perry Deluca    Exercises  - Supine Calf  Stretch with Strap  - 1 x daily - 7 x weekly - 2 sets - 30-60 seconds hold  - Long Sitting Hamstring Stretch  - 1 x daily - 7 x weekly - 2 sets - 30-60 seconds hold  - Supine Knee Extension Mobilization with Weight  - 1 x daily - 7 x weekly - as tolerable hold  - Seated Long Arc Quad  - 1 x daily - 7 x weekly - 2 sets - 15 reps - pauses hold    Charges: Therapeutic Exercise x 3    Assessment: Pt's knee AROM continues to progressively improve but limited in terminal knee extension. Exercises focused on improving knee extension. Continued to focus on gross LE exercise strength, balance, flexibility progressions to improve gait pattern and safety, independence with stair negotiation.       Plan: Progress functional strength.       Perry Deluca, PT

## 2025-07-31 ENCOUNTER — TREATMENT (OUTPATIENT)
Dept: PHYSICAL THERAPY | Facility: CLINIC | Age: 70
End: 2025-07-31
Payer: COMMERCIAL

## 2025-07-31 DIAGNOSIS — Z47.89 ORTHOPEDIC AFTERCARE: ICD-10-CM

## 2025-07-31 DIAGNOSIS — M25.561 ACUTE PAIN OF RIGHT KNEE: Primary | ICD-10-CM

## 2025-07-31 DIAGNOSIS — Z17.0 MALIGNANT NEOPLASM OF UPPER-INNER QUADRANT OF RIGHT BREAST IN FEMALE, ESTROGEN RECEPTOR POSITIVE: ICD-10-CM

## 2025-07-31 DIAGNOSIS — C50.211 MALIGNANT NEOPLASM OF UPPER-INNER QUADRANT OF RIGHT BREAST IN FEMALE, ESTROGEN RECEPTOR POSITIVE: ICD-10-CM

## 2025-07-31 PROCEDURE — 97110 THERAPEUTIC EXERCISES: CPT | Mod: GP

## 2025-08-04 ENCOUNTER — OFFICE VISIT (OUTPATIENT)
Dept: ORTHOPEDIC SURGERY | Facility: CLINIC | Age: 70
End: 2025-08-04
Payer: COMMERCIAL

## 2025-08-04 ENCOUNTER — TREATMENT (OUTPATIENT)
Dept: PHYSICAL THERAPY | Facility: CLINIC | Age: 70
End: 2025-08-04
Payer: COMMERCIAL

## 2025-08-04 DIAGNOSIS — Z96.651 HISTORY OF TOTAL KNEE ARTHROPLASTY, RIGHT: Primary | ICD-10-CM

## 2025-08-04 DIAGNOSIS — Z47.89 ORTHOPEDIC AFTERCARE: ICD-10-CM

## 2025-08-04 DIAGNOSIS — M25.561 ACUTE PAIN OF RIGHT KNEE: Primary | ICD-10-CM

## 2025-08-04 PROCEDURE — 99024 POSTOP FOLLOW-UP VISIT: CPT | Performed by: STUDENT IN AN ORGANIZED HEALTH CARE EDUCATION/TRAINING PROGRAM

## 2025-08-04 PROCEDURE — 97110 THERAPEUTIC EXERCISES: CPT | Mod: GP

## 2025-08-04 PROCEDURE — 1159F MED LIST DOCD IN RCRD: CPT | Performed by: STUDENT IN AN ORGANIZED HEALTH CARE EDUCATION/TRAINING PROGRAM

## 2025-08-04 PROCEDURE — 1125F AMNT PAIN NOTED PAIN PRSNT: CPT | Performed by: STUDENT IN AN ORGANIZED HEALTH CARE EDUCATION/TRAINING PROGRAM

## 2025-08-04 PROCEDURE — 99212 OFFICE O/P EST SF 10 MIN: CPT | Performed by: STUDENT IN AN ORGANIZED HEALTH CARE EDUCATION/TRAINING PROGRAM

## 2025-08-04 ASSESSMENT — PAIN SCALES - GENERAL: PAINLEVEL_OUTOF10: 1

## 2025-08-04 ASSESSMENT — PAIN - FUNCTIONAL ASSESSMENT: PAIN_FUNCTIONAL_ASSESSMENT: 0-10

## 2025-08-04 NOTE — PROGRESS NOTES
"VICTOR M/TKA Related Summary           L hip: N  L knee: N  R hip: N  R knee  7/2/2025: Primary TKA (myself at )  Lumbar surgery or significant pathology: N            CC/SUBJECTIVE/HPI            Radha Shabazz is a 70 y.o. female following up regarding:    R knee: S/p R Primary TKA.  Reports outcome as good  Pain mgmt: OTC meds  Assistive device: cane  Able to navigate stairs: Y  Sleeping normally: Y  PT: yes, current (2/wk)          HISTORIES            Reports no major changes in health, substance use, or baseline medications.            OBJECTIVE            Physical exam  Estimated body mass index is 31.33 kg/m² as calculated from the following:    Height as of 7/2/25: 1.676 m (5' 5.98\").    Weight as of 7/2/25: 88 kg (194 lb 0.1 oz).  Gen/psych: NAD, conversational, appropriate    Ambulation  Gait: nonantalgic  Assistive device: cane  Spine: Unchanged from previous visit    Focused MSK exam: R  TKA  Skin/Incision: CDI, healing appropriately.   Effusion: trace  Alignment: neutral  Tenderness: none  Extension: passive flexion contracture 0° and active extension lag 0°  Flexion: 120º  Flexion stability: stable  Varus/Valgus stability: stable  Referred pain to knee with hip 90° flexion and 45° ER/IR: neg  Neurovascular    Strength: 5/5 hip/knee/ankle flexion and extension  Sensory (L2-S1): SILT throughout lower extremity  Edema/stasis: no pitting edema  Pulse: DP 1+, PT 1+    Imaging  No new imaging           ASSESSMENT & PLAN           Patient verbalized understanding of below A&P. All questions answered.  R Primary TKA  Incision: Dressing/sutures/staples removed. Incision care discussed.  Activity/Therapy: Continue exercises and PT. Advance gait aide as appropriate.  Pain: Decrease narcotics; supplement with OTCs, ice.  DVT ppx  NELSON/ACE: PRN for operative extremity swelling.  Follow-up: 6wks postop with XRs. Doing very well for this early on.  PMH, PSH, other considerations discussed  Timing: On breast ca treatment " that inhibits vascular healing. Cleared by onc team, instructed to hold Verzenio 7d pre and postop. Letter in Epic media. Actually had completed course and di not need to go back on postop.   BMI<40 but on spectrum of increased risk of surgical complications.  May affect periop anticoag choice: Breast ca on anastrozole, confirmed still taking  Tongue cancer, treatment completed  Osteoporosis  L GT bursitis, IT band syndrome  SDD candidate, successful after R TKA  Listed in EMR but not endorsed by pt or evident on exam today: Generalized weakness  Occupation: RN (currently pain mgmt, has worked in ortho previously)  Hobbies: Not specified  PCP: Vineet Lemus MD

## 2025-08-04 NOTE — PROGRESS NOTES
Physical Therapy  Physical Therapy Treatment    Patient Name: Radha Shabazz  MRN: [id number]  Today's Date: 8/4/2025         Insurance:  Insurance Type: [employer]; $50 co-pay  Visit number: 4  Visit Limit: MN  Authorization info: NA    General:  Reason for visit: s/p R TKA on 7/2/25  Referred by: Roque    Precautions: low fall risk     STEADI Fall Risk Score (The score of 4 or more indicates an increased risk of falling): 1    Current Problem  1. Acute pain of right knee        2. Orthopedic aftercare            Pain: 0/10    Subjective:   Subjective   Patient reports she has been doing really well. Feels as though she is progressing near PLOF.    HEP Compliance: Good    Objective:   Objective   Observation: no outward signs of infection    Gait: independent, mild antalgic gait pattern consisting of lack of terminal knee extension    Knee AROM  Flexion  R: 125 deg   L: 130 deg   Extension  R: 0 deg   L: +2 deg         Treatments:     Therapeutic Exercise:                                 Nu step x 5'  Supine calf stretch w/ strap  Supine HS stretch w/ strap,  Supine SLR over chriss 2 x 20  Sit to stands w/ 20# kb 2 x 20  6 inch lateral tap downs 3 x 20  Airex frontal tap downs 2 x 10  6 inch step ups w/ 15# kb 2 x 15  reviewed HEP      Manual Therapy:                            nt            PROM  Patellar mobs      Neuromuscular Re-Education:             nt      Paloff press w/ 5# 2 x 20  Bravo walk outs w/ 5# 2 x 20  Airex marching 2 x 1'      Equipment Provided:   HEP Provided:  Access Code: [email or web address]      Charges: Therapeutic Exercise x 2    Assessment: The focus of the session was Strengthening, ROM, Stretching, Balance, Motor Control, Dynamic Stability Training, and functional training. The pt demonstrated Good tolerance to the noted exercises today. The pt is demonstrated Good progress in skilled rehab at this time. The pt is still limited in overall Strength, ROM, Flexibility, Motor control, Balance, Effusion,  and Pain at this time. The pt continues to be a good candidate for skilled PT, in order to further improve Strength, ROM, Flexibility, Motor control, Balance, Effusion, and Pain.     Pt requested short session due to MD follow up.         Plan: Continue to improve functional mobility, functional strength in order to increase participation in ADLs.         Perry Deluca, PT

## 2025-08-05 NOTE — PROGRESS NOTES
Physical Therapy  Physical Therapy Treatment    Patient Name: Radha Shabazz  MRN: [id number]  Today's Date: 8/6/2025    Time Calculation  Start Time: 1045  Stop Time: 1130  Time Calculation (min): 45 min    Insurance:  Insurance Type: [employer]; $50 co-pay  Visit number: 5  Visit Limit: MN  Authorization info: NA    General:  Reason for visit: s/p R TKA on 7/2/25  Referred by: Roque    Precautions: low fall risk     STEADI Fall Risk Score (The score of 4 or more indicates an increased risk of falling): 1    Current Problem  1. Acute pain of right knee  Follow Up In Physical Therapy      2. Orthopedic aftercare  Follow Up In Physical Therapy          Pain: 0/10    Subjective:   Subjective   Patient reports she rode bike for about 15 minutes last night without issues.    HEP Compliance: Good    Objective:   Objective     Observation: no outward signs of infection    Gait: independent, mild antalgic gait pattern consisting of lack of terminal knee extension    Knee AROM  Flexion  R: 125 deg   L: 130 deg   Extension  R: 0 deg   L: +2 deg       Treatments:     Therapeutic Exercise:                                 Nu step x 5'  SL bridges 2 x 6  Straight leg physioball bridges 2 x 15   LAQ w/ 8# weight 2 x 15   SL leg press 50# 2 x 12  2-1 leg press 70# 2 x 12  6 inch lateral tap downs 2 x 10  3 inch frontal tap downs 2 x 10  reviewed HEP    Neuromuscular Re-Education:                  Airex cw/ccw passes w/ 5# ball 2 x 10 each  Airex tandem up and downs w/ 5# ball 2 x 15  SL balance (HEP)    Equipment Provided:   HEP Provided:  Access Code: [id number]      Charges: Therapeutic Exercise x 2, Neuromuscular Re-Education x 1    Assessment: Pt continues to progress very well. Pt has full, painless range of motion. Continued to address difficulty with stair negotiation using stair exercises as well as leg press variations to help with eccentric control. Pt tolerated session well, without significant increases in pain. Educated pt  on progressing back to PLOF and possible discharge within a few appointments.       Plan: Progress SL stability to improve stair negotiation as well as overall functional endurance.        Perry Deluca, PT

## 2025-08-06 ENCOUNTER — TREATMENT (OUTPATIENT)
Dept: PHYSICAL THERAPY | Facility: CLINIC | Age: 70
End: 2025-08-06
Payer: COMMERCIAL

## 2025-08-06 DIAGNOSIS — Z47.89 ORTHOPEDIC AFTERCARE: ICD-10-CM

## 2025-08-06 DIAGNOSIS — M25.561 ACUTE PAIN OF RIGHT KNEE: Primary | ICD-10-CM

## 2025-08-06 PROCEDURE — 97112 NEUROMUSCULAR REEDUCATION: CPT | Mod: GP

## 2025-08-06 PROCEDURE — 97110 THERAPEUTIC EXERCISES: CPT | Mod: GP

## 2025-08-12 ENCOUNTER — APPOINTMENT (OUTPATIENT)
Dept: PHYSICAL THERAPY | Facility: CLINIC | Age: 70
End: 2025-08-12
Payer: COMMERCIAL

## 2025-08-13 ENCOUNTER — TREATMENT (OUTPATIENT)
Dept: PHYSICAL THERAPY | Facility: CLINIC | Age: 70
End: 2025-08-13
Payer: COMMERCIAL

## 2025-08-13 ENCOUNTER — INFUSION (OUTPATIENT)
Dept: HEMATOLOGY/ONCOLOGY | Facility: CLINIC | Age: 70
End: 2025-08-13
Payer: COMMERCIAL

## 2025-08-13 VITALS
OXYGEN SATURATION: 97 % | BODY MASS INDEX: 30.36 KG/M2 | TEMPERATURE: 95.7 F | WEIGHT: 188 LBS | HEART RATE: 79 BPM | DIASTOLIC BLOOD PRESSURE: 83 MMHG | RESPIRATION RATE: 18 BRPM | SYSTOLIC BLOOD PRESSURE: 127 MMHG

## 2025-08-13 DIAGNOSIS — Z47.89 ORTHOPEDIC AFTERCARE: ICD-10-CM

## 2025-08-13 DIAGNOSIS — C50.211 MALIGNANT NEOPLASM OF UPPER-INNER QUADRANT OF RIGHT BREAST IN FEMALE, ESTROGEN RECEPTOR POSITIVE: ICD-10-CM

## 2025-08-13 DIAGNOSIS — C50.211 MALIGNANT NEOPLASM OF UPPER-INNER QUADRANT OF RIGHT FEMALE BREAST, UNSPECIFIED ESTROGEN RECEPTOR STATUS: ICD-10-CM

## 2025-08-13 DIAGNOSIS — M25.561 ACUTE PAIN OF RIGHT KNEE: ICD-10-CM

## 2025-08-13 DIAGNOSIS — Z17.0 MALIGNANT NEOPLASM OF UPPER-INNER QUADRANT OF RIGHT BREAST IN FEMALE, ESTROGEN RECEPTOR POSITIVE: ICD-10-CM

## 2025-08-13 PROCEDURE — 2500000004 HC RX 250 GENERAL PHARMACY W/ HCPCS (ALT 636 FOR OP/ED): Performed by: INTERNAL MEDICINE

## 2025-08-13 PROCEDURE — 96523 IRRIG DRUG DELIVERY DEVICE: CPT

## 2025-08-13 PROCEDURE — 97110 THERAPEUTIC EXERCISES: CPT | Mod: GP

## 2025-08-13 RX ORDER — HEPARIN 100 UNIT/ML
500 SYRINGE INTRAVENOUS AS NEEDED
OUTPATIENT
Start: 2025-08-13

## 2025-08-13 RX ORDER — HEPARIN SODIUM,PORCINE/PF 10 UNIT/ML
50 SYRINGE (ML) INTRAVENOUS AS NEEDED
OUTPATIENT
Start: 2025-08-13

## 2025-08-13 RX ORDER — HEPARIN SODIUM,PORCINE/PF 10 UNIT/ML
50 SYRINGE (ML) INTRAVENOUS AS NEEDED
Status: DISCONTINUED | OUTPATIENT
Start: 2025-08-13 | End: 2025-08-13 | Stop reason: HOSPADM

## 2025-08-13 RX ORDER — HEPARIN 100 UNIT/ML
500 SYRINGE INTRAVENOUS AS NEEDED
Status: DISCONTINUED | OUTPATIENT
Start: 2025-08-13 | End: 2025-08-13 | Stop reason: HOSPADM

## 2025-08-13 RX ADMIN — HEPARIN 500 UNITS: 100 SYRINGE at 12:20

## 2025-08-13 ASSESSMENT — PAIN SCALES - GENERAL: PAINLEVEL_OUTOF10: 0-NO PAIN

## 2025-08-14 ENCOUNTER — APPOINTMENT (OUTPATIENT)
Dept: PHYSICAL THERAPY | Facility: CLINIC | Age: 70
End: 2025-08-14
Payer: COMMERCIAL

## 2025-08-19 ENCOUNTER — APPOINTMENT (OUTPATIENT)
Dept: PHYSICAL THERAPY | Facility: CLINIC | Age: 70
End: 2025-08-19
Payer: COMMERCIAL

## 2025-08-20 ENCOUNTER — TREATMENT (OUTPATIENT)
Dept: PHYSICAL THERAPY | Facility: CLINIC | Age: 70
End: 2025-08-20
Payer: COMMERCIAL

## 2025-08-20 DIAGNOSIS — M25.561 ACUTE PAIN OF RIGHT KNEE: ICD-10-CM

## 2025-08-20 DIAGNOSIS — Z47.89 ORTHOPEDIC AFTERCARE: ICD-10-CM

## 2025-08-20 PROBLEM — M17.11 ARTHRITIS OF RIGHT KNEE: Status: RESOLVED | Noted: 2025-07-30 | Resolved: 2025-08-20

## 2025-08-20 PROCEDURE — 97110 THERAPEUTIC EXERCISES: CPT | Mod: GP

## 2025-08-21 ENCOUNTER — APPOINTMENT (OUTPATIENT)
Dept: PHYSICAL THERAPY | Facility: CLINIC | Age: 70
End: 2025-08-21
Payer: COMMERCIAL

## 2025-08-21 ENCOUNTER — APPOINTMENT (OUTPATIENT)
Facility: CLINIC | Age: 70
End: 2025-08-21
Payer: COMMERCIAL

## 2025-08-21 VITALS
BODY MASS INDEX: 30.91 KG/M2 | DIASTOLIC BLOOD PRESSURE: 83 MMHG | RESPIRATION RATE: 16 BRPM | HEART RATE: 83 BPM | SYSTOLIC BLOOD PRESSURE: 121 MMHG | WEIGHT: 191.4 LBS

## 2025-08-21 DIAGNOSIS — T85.44XA CAPSULAR CONTRACTURE OF LEFT BREAST IMPLANT: ICD-10-CM

## 2025-08-21 DIAGNOSIS — Z98.890 S/P BREAST RECONSTRUCTION, LEFT: Primary | ICD-10-CM

## 2025-08-21 PROCEDURE — 99213 OFFICE O/P EST LOW 20 MIN: CPT | Performed by: SURGERY

## 2025-08-21 PROCEDURE — 3074F SYST BP LT 130 MM HG: CPT | Performed by: SURGERY

## 2025-08-21 PROCEDURE — 1126F AMNT PAIN NOTED NONE PRSNT: CPT | Performed by: SURGERY

## 2025-08-21 PROCEDURE — 3079F DIAST BP 80-89 MM HG: CPT | Performed by: SURGERY

## 2025-08-21 ASSESSMENT — PAIN SCALES - GENERAL: PAINLEVEL_OUTOF10: 0-NO PAIN

## 2025-08-25 ENCOUNTER — APPOINTMENT (OUTPATIENT)
Dept: PHYSICAL THERAPY | Facility: CLINIC | Age: 70
End: 2025-08-25
Payer: COMMERCIAL

## 2025-08-27 ENCOUNTER — APPOINTMENT (OUTPATIENT)
Dept: PHYSICAL THERAPY | Facility: CLINIC | Age: 70
End: 2025-08-27
Payer: COMMERCIAL

## 2025-09-05 ENCOUNTER — TELEPHONE (OUTPATIENT)
Dept: PLASTIC SURGERY | Facility: CLINIC | Age: 70
End: 2025-09-05
Payer: COMMERCIAL

## 2025-09-05 ENCOUNTER — OFFICE VISIT (OUTPATIENT)
Dept: ORTHOPEDIC SURGERY | Facility: CLINIC | Age: 70
End: 2025-09-05
Payer: COMMERCIAL

## 2025-09-05 ENCOUNTER — HOSPITAL ENCOUNTER (OUTPATIENT)
Dept: RADIOLOGY | Facility: CLINIC | Age: 70
Discharge: HOME | End: 2025-09-05
Payer: COMMERCIAL

## 2025-09-05 ENCOUNTER — APPOINTMENT (OUTPATIENT)
Dept: ORTHOPEDIC SURGERY | Facility: CLINIC | Age: 70
End: 2025-09-05
Payer: COMMERCIAL

## 2025-09-05 DIAGNOSIS — Z96.651 HISTORY OF TOTAL KNEE ARTHROPLASTY, RIGHT: ICD-10-CM

## 2025-09-05 DIAGNOSIS — Z96.651 HISTORY OF TOTAL KNEE ARTHROPLASTY, RIGHT: Primary | ICD-10-CM

## 2025-09-05 PROCEDURE — 1159F MED LIST DOCD IN RCRD: CPT | Performed by: STUDENT IN AN ORGANIZED HEALTH CARE EDUCATION/TRAINING PROGRAM

## 2025-09-05 PROCEDURE — 1036F TOBACCO NON-USER: CPT | Performed by: STUDENT IN AN ORGANIZED HEALTH CARE EDUCATION/TRAINING PROGRAM

## 2025-09-05 PROCEDURE — 99024 POSTOP FOLLOW-UP VISIT: CPT | Performed by: STUDENT IN AN ORGANIZED HEALTH CARE EDUCATION/TRAINING PROGRAM

## 2025-09-05 PROCEDURE — 73562 X-RAY EXAM OF KNEE 3: CPT | Mod: RT

## 2025-09-05 PROCEDURE — 99212 OFFICE O/P EST SF 10 MIN: CPT | Performed by: STUDENT IN AN ORGANIZED HEALTH CARE EDUCATION/TRAINING PROGRAM

## 2025-09-05 ASSESSMENT — PAIN - FUNCTIONAL ASSESSMENT: PAIN_FUNCTIONAL_ASSESSMENT: NO/DENIES PAIN

## 2025-12-17 ENCOUNTER — APPOINTMENT (OUTPATIENT)
Dept: RADIOLOGY | Facility: CLINIC | Age: 70
End: 2025-12-17
Payer: COMMERCIAL

## 2026-03-30 ENCOUNTER — APPOINTMENT (OUTPATIENT)
Dept: UROLOGY | Facility: CLINIC | Age: 71
End: 2026-03-30
Payer: COMMERCIAL

## (undated) DEVICE — DRAPE, SHEET, THREE QUARTER, FAN FOLD, 57 X 77 IN

## (undated) DEVICE — DRAPE, INSTRUMENT, W/POUCH, STERI DRAPE, 7 X 11 IN, DISPOSABLE, STERILE

## (undated) DEVICE — GOWN, SURGICAL, ECLIPSE, FABRIC, 2XL, REINFORCED

## (undated) DEVICE — MAKO DRAPE KIT

## (undated) DEVICE — CUFF, TOURNIQUET, 30 X 4, DUAL PORT/SNGL BLADDER, DISP, LF

## (undated) DEVICE — RESTRAINT, WRIST, XLONG, DISPOSABLE

## (undated) DEVICE — MASK, AURASTRAIGHT, LARYN, SIZE 5

## (undated) DEVICE — SOLUTION, IRRIGATION, X RX SODIUM CHL 0.9%, 1000ML BTL

## (undated) DEVICE — SUTURE, VICRYL, 1, 36 IN, CT-1, UNDYED

## (undated) DEVICE — CLOSURE SYSTEM, DERMABOND, PRINEO, 22CM, STERILE

## (undated) DEVICE — BITE BLOCK, SOFT, MOUTH, 3/4 X 4, LARGE, WHITE

## (undated) DEVICE — SUTURE, VICRYL, 2-0, 18 IN CP-2, UNDYED

## (undated) DEVICE — DUAL CUT SAGITTAL BLADE

## (undated) DEVICE — PREP, SKIN, BETADINE, SCRUB, 1 GA

## (undated) DEVICE — CATHETER, SUCTION, CATH-N-GLOVE, PEEL POUCH, 18 FR

## (undated) DEVICE — SOLUTION, INJECTION, USP, LACTATED RINGERS, LIFECARE, 1000ML

## (undated) DEVICE — HOOD, SURGICAL, FLYTE HYBRID

## (undated) DEVICE — BLADE, MAKO, SAGITTAL, NARROW

## (undated) DEVICE — CEMENT, MIXEVAC III, 10S BOWL, KNEES

## (undated) DEVICE — GLOVE, SURGICAL, PROTEXIS PI , 7.5, PF, LF

## (undated) DEVICE — IRRIGATION SYSTEM, WOUND, PULSAVAC PLUS

## (undated) DEVICE — PROTECTIVE, FOOT, FOAM PAD & COHESIVE WRAP, STERILE

## (undated) DEVICE — BANDAGE, COFLEX, 6 X 5 YDS, TAN, STERILE, LF

## (undated) DEVICE — BONE PIN, 3.2MM X 110MM, STERILE

## (undated) DEVICE — SUTURE, ETHIBOND, P2, V-37, 30 IN, GREEN

## (undated) DEVICE — Device

## (undated) DEVICE — DRESSING, FOAM, MEPILEX LITE, 4 X 4

## (undated) DEVICE — TRACKING KIT, TM KNEE PROCEDURES, VIZADISC

## (undated) DEVICE — SUTURE, STRATAFIX, 3-0 MONOCRYL PLUS, PS-2 SPIRAL UNDYED

## (undated) DEVICE — GLOVE, SURGICAL, PROTEXIS PI BLUE W/NEUTHERA, 8.0, PF, LF

## (undated) DEVICE — DRESSING, MEPILEX BORDER, POST-OP AG, 4 X 12 IN

## (undated) DEVICE — SOLUTION, POVIDONE IODINE 10%, 0.75 OZ, NS

## (undated) DEVICE — BONE PINS (3.2MM X 140MM)

## (undated) DEVICE — SUTURE, ETHILON, 3-0, 18 IN, PS1, BLACK

## (undated) DEVICE — CHECKPOINT KIT, FEMORAL/ TIBIAL